# Patient Record
Sex: FEMALE | Race: WHITE | NOT HISPANIC OR LATINO | Employment: OTHER | URBAN - METROPOLITAN AREA
[De-identification: names, ages, dates, MRNs, and addresses within clinical notes are randomized per-mention and may not be internally consistent; named-entity substitution may affect disease eponyms.]

---

## 2023-03-03 PROBLEM — J45.909 REACTIVE AIRWAY DISEASE (HHS-HCC): Status: ACTIVE | Noted: 2023-03-03

## 2023-03-03 PROBLEM — M26.659 TMJ ARTHROPATHY: Status: ACTIVE | Noted: 2023-03-03

## 2023-03-03 PROBLEM — K20.90 ESOPHAGITIS: Status: ACTIVE | Noted: 2023-03-03

## 2023-03-03 PROBLEM — L81.9 PIGMENTED SKIN LESION: Status: ACTIVE | Noted: 2023-03-03

## 2023-03-03 PROBLEM — N64.89 BREAST ASYMMETRY: Status: ACTIVE | Noted: 2023-03-03

## 2023-03-03 PROBLEM — R31.9 HEMATURIA: Status: ACTIVE | Noted: 2023-03-03

## 2023-03-03 PROBLEM — E78.5 DYSLIPIDEMIA: Status: ACTIVE | Noted: 2023-03-03

## 2023-03-03 PROBLEM — R73.03 PREDIABETES: Status: ACTIVE | Noted: 2023-03-03

## 2023-03-03 PROBLEM — R82.90 ABNORMAL FINDING IN URINE: Status: ACTIVE | Noted: 2023-03-03

## 2023-03-03 PROBLEM — R05.9 COUGH: Status: ACTIVE | Noted: 2023-03-03

## 2023-03-03 PROBLEM — E55.9 VITAMIN D DEFICIENCY: Status: ACTIVE | Noted: 2023-03-03

## 2023-03-03 PROBLEM — N90.89 VULVAR LESION: Status: ACTIVE | Noted: 2023-03-03

## 2023-03-03 PROBLEM — I45.6 WOLFF-PARKINSON-WHITE SYNDROME: Status: ACTIVE | Noted: 2023-03-03

## 2023-03-03 PROBLEM — R63.4 WEIGHT LOSS: Status: ACTIVE | Noted: 2023-03-03

## 2023-03-03 PROBLEM — R53.83 FATIGUE: Status: ACTIVE | Noted: 2023-03-03

## 2023-03-03 PROBLEM — E53.8 VITAMIN B12 DEFICIENCY: Status: ACTIVE | Noted: 2023-03-03

## 2023-03-03 PROBLEM — M85.80 OSTEOPENIA: Status: ACTIVE | Noted: 2023-03-03

## 2023-03-03 PROBLEM — R10.13 EPIGASTRIC PAIN: Status: ACTIVE | Noted: 2023-03-03

## 2023-03-03 PROBLEM — R41.3 MEMORY LOSS: Status: ACTIVE | Noted: 2023-03-03

## 2023-03-03 PROBLEM — R10.9 ABDOMINAL PAIN: Status: ACTIVE | Noted: 2023-03-03

## 2023-03-06 ENCOUNTER — OFFICE VISIT (OUTPATIENT)
Dept: PRIMARY CARE | Facility: CLINIC | Age: 73
End: 2023-03-06
Payer: MEDICARE

## 2023-03-06 VITALS
HEART RATE: 84 BPM | BODY MASS INDEX: 19.14 KG/M2 | HEIGHT: 62 IN | SYSTOLIC BLOOD PRESSURE: 136 MMHG | DIASTOLIC BLOOD PRESSURE: 82 MMHG | WEIGHT: 104 LBS

## 2023-03-06 DIAGNOSIS — I45.6 WOLFF-PARKINSON-WHITE SYNDROME: ICD-10-CM

## 2023-03-06 DIAGNOSIS — F41.9 ANXIETY: ICD-10-CM

## 2023-03-06 DIAGNOSIS — R07.9 CHEST PAIN, UNSPECIFIED TYPE: Primary | ICD-10-CM

## 2023-03-06 DIAGNOSIS — K20.90 ESOPHAGITIS: ICD-10-CM

## 2023-03-06 PROCEDURE — 99215 OFFICE O/P EST HI 40 MIN: CPT | Performed by: FAMILY MEDICINE

## 2023-03-06 PROCEDURE — 93000 ELECTROCARDIOGRAM COMPLETE: CPT | Performed by: FAMILY MEDICINE

## 2023-03-06 PROCEDURE — 1159F MED LIST DOCD IN RCRD: CPT | Performed by: FAMILY MEDICINE

## 2023-03-06 RX ORDER — PANTOPRAZOLE SODIUM 40 MG/1
40 TABLET, DELAYED RELEASE ORAL DAILY
Qty: 30 TABLET | Refills: 1 | Status: SHIPPED | OUTPATIENT
Start: 2023-03-06 | End: 2023-10-26 | Stop reason: ALTCHOICE

## 2023-03-06 RX ORDER — HYDROXYZINE HYDROCHLORIDE 10 MG/1
10 TABLET, FILM COATED ORAL EVERY 6 HOURS PRN
Qty: 30 TABLET | Refills: 0 | Status: SHIPPED | OUTPATIENT
Start: 2023-03-06 | End: 2023-08-07 | Stop reason: ALTCHOICE

## 2023-03-06 ASSESSMENT — ENCOUNTER SYMPTOMS
APPETITE CHANGE: 1
WEAKNESS: 1
NAUSEA: 1
ALLERGIC/IMMUNOLOGIC NEGATIVE: 1
RESPIRATORY NEGATIVE: 1
DIZZINESS: 1
EYES NEGATIVE: 1
NERVOUS/ANXIOUS: 1
HEMATOLOGIC/LYMPHATIC NEGATIVE: 1
NUMBNESS: 1
MUSCULOSKELETAL NEGATIVE: 1
ABDOMINAL PAIN: 1
ENDOCRINE NEGATIVE: 1

## 2023-03-06 NOTE — PROGRESS NOTES
"Subjective   Patient ID: Abena Shaw is a 72 y.o. female who presents for Chest Pain (Pt is experiencing chest tightness/ pressure  that travels up to her throat ).  Today she is accompanied by huband     Chest Pain   Associated symptoms include abdominal pain, dizziness, nausea, numbness and weakness.    patient complains of chest pain feels pain in the abdomen radiating upwards also feels discomfort in her throat occasional nausea tingling has had worsening anxiety has history of esophagitis also has history of WPW denies syncope  Current Outpatient Medications on File Prior to Visit   Medication Sig Dispense Refill    CALCIUM ORAL Calcium TABS   Refills: 0       Active       No current facility-administered medications on file prior to visit.        Review of Systems   Constitutional:  Positive for appetite change.   HENT: Negative.     Eyes: Negative.    Respiratory: Negative.     Cardiovascular:  Positive for chest pain.   Gastrointestinal:  Positive for abdominal pain and nausea.   Endocrine: Negative.    Genitourinary: Negative.    Musculoskeletal: Negative.    Allergic/Immunologic: Negative.    Neurological:  Positive for dizziness, weakness and numbness.   Hematological: Negative.    Psychiatric/Behavioral:  The patient is nervous/anxious.        Objective   /82   Pulse 84   Ht 1.575 m (5' 2\")   Wt 47.2 kg (104 lb)   BMI 19.02 kg/m²   BSA: 1.44 meters squared  Growth percentiles: Facility age limit for growth %celestino is 20 years. Facility age limit for growth %celsetino is 20 years.   No visits with results within 1 Week(s) from this visit.   Latest known visit with results is:   Legacy Encounter on 06/21/2022   Component Date Value Ref Range Status    Folate 06/21/2022 6.1  >5.0 ng/mL Final    Comment: Low           <3.4  Borderline 3.4-5.0  Normal        >5.0  .   Biotin interference may cause falsely elevated results.    Patients taking a Biotin dose of up to 5 mg/day should    refrain from " taking Biotin for 24 hours before sample    collection. Providers may contact their local laboratory   for further information.        Physical Exam  Vitals and nursing note reviewed.   Constitutional:       Appearance: Normal appearance.   HENT:      Head: Normocephalic and atraumatic.      Right Ear: Tympanic membrane normal.      Left Ear: Tympanic membrane normal.      Nose: Nose normal.      Mouth/Throat:      Mouth: Mucous membranes are moist.   Eyes:      Pupils: Pupils are equal, round, and reactive to light.   Cardiovascular:      Rate and Rhythm: Normal rate and regular rhythm.      Pulses: Normal pulses.      Heart sounds: Normal heart sounds.   Pulmonary:      Effort: Pulmonary effort is normal.      Breath sounds: Normal breath sounds.   Abdominal:      General: Abdomen is flat. Bowel sounds are normal.      Palpations: Abdomen is soft.   Musculoskeletal:         General: Normal range of motion.      Cervical back: Normal range of motion and neck supple.   Skin:     General: Skin is warm and dry.      Capillary Refill: Capillary refill takes less than 2 seconds.   Neurological:      General: No focal deficit present.      Mental Status: She is alert and oriented to person, place, and time.   Psychiatric:         Mood and Affect: Mood normal.         Assessment/Plan   Problem List Items Addressed This Visit          Circulatory    Renea-Parkinson-White syndrome     Patient has not had any episodes since her ablation will refer to cardiology         Relevant Orders    Referral to Cardiology       Digestive    Esophagitis     Start PPI eat small portions avoid eating late at night decrease caffeine intake and stop vaping         Relevant Medications    pantoprazole (ProtoNix) 40 mg EC tablet       Other    Chest pain - Primary     cardiology referral placed  has been a smoker         Relevant Medications    pantoprazole (ProtoNix) 40 mg EC tablet    Other Relevant Orders    ECG 12 lead (Completed)     Referral to Cardiology    Anxiety     Start hydroxyzine as needed patient will call back depending on how she tolerates medication         Relevant Medications    hydrOXYzine HCL (Atarax) 10 mg tablet     [unfilled]    Assessment/Plan   Problem List Items Addressed This Visit          Circulatory    Renea-Parkinson-White syndrome     Patient has not had any episodes since her ablation will refer to cardiology         Relevant Orders    Referral to Cardiology       Digestive    Esophagitis     Start PPI eat small portions avoid eating late at night decrease caffeine intake and stop vaping         Relevant Medications    pantoprazole (ProtoNix) 40 mg EC tablet       Other    Chest pain - Primary     cardiology referral placed  has been a smoker         Relevant Medications    pantoprazole (ProtoNix) 40 mg EC tablet    Other Relevant Orders    ECG 12 lead (Completed)    Referral to Cardiology    Anxiety     Start hydroxyzine as needed patient will call back depending on how she tolerates medication         Relevant Medications    hydrOXYzine HCL (Atarax) 10 mg tablet

## 2023-08-07 ENCOUNTER — OFFICE VISIT (OUTPATIENT)
Dept: PRIMARY CARE | Facility: CLINIC | Age: 73
End: 2023-08-07
Payer: MEDICARE

## 2023-08-07 VITALS
HEIGHT: 62 IN | WEIGHT: 100 LBS | DIASTOLIC BLOOD PRESSURE: 80 MMHG | SYSTOLIC BLOOD PRESSURE: 157 MMHG | HEART RATE: 62 BPM | BODY MASS INDEX: 18.4 KG/M2

## 2023-08-07 DIAGNOSIS — E53.8 VITAMIN B12 DEFICIENCY: ICD-10-CM

## 2023-08-07 DIAGNOSIS — Z78.0 POSTMENOPAUSAL: ICD-10-CM

## 2023-08-07 DIAGNOSIS — I47.10 SUPRAVENTRICULAR TACHYCARDIA, PAROXYSMAL (CMS-HCC): ICD-10-CM

## 2023-08-07 DIAGNOSIS — M85.88 OSTEOPENIA OF LUMBAR SPINE: ICD-10-CM

## 2023-08-07 DIAGNOSIS — E78.5 DYSLIPIDEMIA: ICD-10-CM

## 2023-08-07 DIAGNOSIS — Z12.31 VISIT FOR SCREENING MAMMOGRAM: ICD-10-CM

## 2023-08-07 DIAGNOSIS — N64.9 DISORDER OF BREAST, UNSPECIFIED: ICD-10-CM

## 2023-08-07 DIAGNOSIS — Z12.11 COLON CANCER SCREENING: ICD-10-CM

## 2023-08-07 DIAGNOSIS — Z00.00 ROUTINE GENERAL MEDICAL EXAMINATION AT A HEALTH CARE FACILITY: Primary | ICD-10-CM

## 2023-08-07 DIAGNOSIS — E55.9 VITAMIN D DEFICIENCY: ICD-10-CM

## 2023-08-07 DIAGNOSIS — N63.0 MASS OF BREAST, UNSPECIFIED LATERALITY: ICD-10-CM

## 2023-08-07 DIAGNOSIS — Z00.00 ROUTINE GENERAL MEDICAL EXAMINATION AT HEALTH CARE FACILITY: ICD-10-CM

## 2023-08-07 PROBLEM — R06.09 DOE (DYSPNEA ON EXERTION): Status: ACTIVE | Noted: 2023-08-07

## 2023-08-07 PROBLEM — R42 LIGHTHEADEDNESS: Status: ACTIVE | Noted: 2023-08-07

## 2023-08-07 LAB
ALANINE AMINOTRANSFERASE (SGPT) (U/L) IN SER/PLAS: 12 U/L (ref 7–45)
ALBUMIN (G/DL) IN SER/PLAS: 4.1 G/DL (ref 3.4–5)
ALKALINE PHOSPHATASE (U/L) IN SER/PLAS: 46 U/L (ref 33–136)
ANION GAP IN SER/PLAS: 12 MMOL/L (ref 10–20)
ASPARTATE AMINOTRANSFERASE (SGOT) (U/L) IN SER/PLAS: 20 U/L (ref 9–39)
BILIRUBIN TOTAL (MG/DL) IN SER/PLAS: 0.6 MG/DL (ref 0–1.2)
CALCIDIOL (25 OH VITAMIN D3) (NG/ML) IN SER/PLAS: 63 NG/ML
CALCIUM (MG/DL) IN SER/PLAS: 10 MG/DL (ref 8.6–10.6)
CARBON DIOXIDE, TOTAL (MMOL/L) IN SER/PLAS: 29 MMOL/L (ref 21–32)
CHLORIDE (MMOL/L) IN SER/PLAS: 104 MMOL/L (ref 98–107)
COBALAMIN (VITAMIN B12) (PG/ML) IN SER/PLAS: >2000 PG/ML (ref 211–911)
CREATININE (MG/DL) IN SER/PLAS: 0.81 MG/DL (ref 0.5–1.05)
ERYTHROCYTE DISTRIBUTION WIDTH (RATIO) BY AUTOMATED COUNT: 13.1 % (ref 11.5–14.5)
ERYTHROCYTE MEAN CORPUSCULAR HEMOGLOBIN CONCENTRATION (G/DL) BY AUTOMATED: 31.1 G/DL (ref 32–36)
ERYTHROCYTE MEAN CORPUSCULAR VOLUME (FL) BY AUTOMATED COUNT: 98 FL (ref 80–100)
ERYTHROCYTES (10*6/UL) IN BLOOD BY AUTOMATED COUNT: 4.22 X10E12/L (ref 4–5.2)
GFR FEMALE: 76 ML/MIN/1.73M2
GLUCOSE (MG/DL) IN SER/PLAS: 95 MG/DL (ref 74–99)
HEMATOCRIT (%) IN BLOOD BY AUTOMATED COUNT: 41.2 % (ref 36–46)
HEMOGLOBIN (G/DL) IN BLOOD: 12.8 G/DL (ref 12–16)
LEUKOCYTES (10*3/UL) IN BLOOD BY AUTOMATED COUNT: 8 X10E9/L (ref 4.4–11.3)
NRBC (PER 100 WBCS) BY AUTOMATED COUNT: 0 /100 WBC (ref 0–0)
PLATELETS (10*3/UL) IN BLOOD AUTOMATED COUNT: 308 X10E9/L (ref 150–450)
POTASSIUM (MMOL/L) IN SER/PLAS: 4.1 MMOL/L (ref 3.5–5.3)
PROTEIN TOTAL: 7.4 G/DL (ref 6.4–8.2)
SODIUM (MMOL/L) IN SER/PLAS: 141 MMOL/L (ref 136–145)
THYROTROPIN (MIU/L) IN SER/PLAS BY DETECTION LIMIT <= 0.05 MIU/L: 1.7 MIU/L (ref 0.44–3.98)
UREA NITROGEN (MG/DL) IN SER/PLAS: 13 MG/DL (ref 6–23)

## 2023-08-07 PROCEDURE — 1160F RVW MEDS BY RX/DR IN RCRD: CPT | Performed by: FAMILY MEDICINE

## 2023-08-07 PROCEDURE — 4004F PT TOBACCO SCREEN RCVD TLK: CPT | Performed by: FAMILY MEDICINE

## 2023-08-07 PROCEDURE — 99397 PER PM REEVAL EST PAT 65+ YR: CPT | Performed by: FAMILY MEDICINE

## 2023-08-07 PROCEDURE — 80053 COMPREHEN METABOLIC PANEL: CPT

## 2023-08-07 PROCEDURE — 80061 LIPID PANEL: CPT

## 2023-08-07 PROCEDURE — 84443 ASSAY THYROID STIM HORMONE: CPT

## 2023-08-07 PROCEDURE — G0439 PPPS, SUBSEQ VISIT: HCPCS | Performed by: FAMILY MEDICINE

## 2023-08-07 PROCEDURE — 82306 VITAMIN D 25 HYDROXY: CPT

## 2023-08-07 PROCEDURE — G0444 DEPRESSION SCREEN ANNUAL: HCPCS | Performed by: FAMILY MEDICINE

## 2023-08-07 PROCEDURE — 82607 VITAMIN B-12: CPT

## 2023-08-07 PROCEDURE — 85027 COMPLETE CBC AUTOMATED: CPT

## 2023-08-07 PROCEDURE — 1159F MED LIST DOCD IN RCRD: CPT | Performed by: FAMILY MEDICINE

## 2023-08-07 PROCEDURE — 1170F FXNL STATUS ASSESSED: CPT | Performed by: FAMILY MEDICINE

## 2023-08-07 RX ORDER — ACETAMINOPHEN 500 MG
TABLET ORAL
COMMUNITY
Start: 2023-04-06

## 2023-08-07 RX ORDER — NITROGLYCERIN 0.4 MG/1
TABLET SUBLINGUAL
COMMUNITY
Start: 2023-05-03 | End: 2024-02-15 | Stop reason: ALTCHOICE

## 2023-08-07 RX ORDER — METOPROLOL SUCCINATE 50 MG/1
50 TABLET, EXTENDED RELEASE ORAL DAILY
COMMUNITY
End: 2024-05-06 | Stop reason: SDUPTHER

## 2023-08-07 RX ORDER — VITAMIN B COMPLEX
TABLET ORAL
COMMUNITY
Start: 2023-04-06

## 2023-08-07 ASSESSMENT — PATIENT HEALTH QUESTIONNAIRE - PHQ9
SUM OF ALL RESPONSES TO PHQ9 QUESTIONS 1 AND 2: 0
2. FEELING DOWN, DEPRESSED OR HOPELESS: NOT AT ALL
1. LITTLE INTEREST OR PLEASURE IN DOING THINGS: NOT AT ALL

## 2023-08-07 ASSESSMENT — ACTIVITIES OF DAILY LIVING (ADL)
GROCERY_SHOPPING: INDEPENDENT
BATHING: INDEPENDENT
MANAGING_FINANCES: INDEPENDENT
DRESSING: INDEPENDENT
TAKING_MEDICATION: INDEPENDENT
DOING_HOUSEWORK: INDEPENDENT

## 2023-08-07 ASSESSMENT — ENCOUNTER SYMPTOMS
DEPRESSION: 0
OCCASIONAL FEELINGS OF UNSTEADINESS: 0
LOSS OF SENSATION IN FEET: 0

## 2023-08-07 NOTE — PROGRESS NOTES
"Subjective   Reason for Visit: Abena Shaw is an 72 y.o. female here for a Medicare Wellness visit.     Past Medical, Surgical, and Family History reviewed and updated in chart.    Reviewed all medications by prescribing practitioner or clinical pharmacist (such as prescriptions, OTCs, herbal therapies and supplements) and documented in the medical record.    HPI feels mass left and right breast    Patient Care Team:  Phillip Mendosa MD as PCP - General  Phillip Mendosa MD as PCP - [employer] PCP     Review of Systems   Constitutional:  Negative for chills and fever.   HENT: Negative.     Respiratory: Negative.     Cardiovascular: Negative.    Gastrointestinal: Negative.  Negative for nausea and vomiting.   Endocrine: Negative.    Genitourinary: Negative.    Musculoskeletal: Negative.    Skin: Negative.  Negative for rash.   Allergic/Immunologic: Negative.    Neurological: Negative.    Hematological: Negative.    Psychiatric/Behavioral: Negative.     All other systems reviewed and are negative.      Objective   Vitals:  Blood Pressure 157/80   Pulse 62   Height 1.575 m (5' 2\")   Weight 45.4 kg (100 lb)   Body Mass Index 18.29 kg/m²       Physical Exam  Constitutional:       General: She is not in acute distress.  Eyes:      Extraocular Movements: Extraocular movements intact.   Cardiovascular:      Rate and Rhythm: Normal rate and regular rhythm.   Pulmonary:      Breath sounds: Normal breath sounds.   Abdominal:      General: Bowel sounds are normal.   Musculoskeletal:         General: Normal range of motion.      Cervical back: No rigidity.   Skin:     Findings: No rash.   Neurological:      General: No focal deficit present.      Mental Status: She is alert.   Psychiatric:         Thought Content: Thought content normal.         Assessment/Plan   Problem List Items Addressed This Visit       Dyslipidemia    Relevant Orders    CBC (Completed)    Comprehensive Metabolic Panel (Completed) " "   TSH with reflex to Free T4 if abnormal (Completed)    Osteopenia    Relevant Orders    CBC (Completed)    Comprehensive Metabolic Panel (Completed)    TSH with reflex to Free T4 if abnormal (Completed)    Vitamin D, Total (Completed)    Vitamin B12 deficiency    Relevant Orders    CBC (Completed)    Comprehensive Metabolic Panel (Completed)    TSH with reflex to Free T4 if abnormal (Completed)    Vitamin B12 (Completed)    Vitamin D deficiency    Relevant Orders    CBC (Completed)    Comprehensive Metabolic Panel (Completed)    TSH with reflex to Free T4 if abnormal (Completed)    Vitamin D, Total (Completed)    Supraventricular tachycardia, paroxysmal (CMS/HCC)    Current Assessment & Plan     C/w metoprolol         Relevant Medications    metoprolol succinate XL (Toprol-XL) 50 mg 24 hr tablet    nitroglycerin (Nitrostat) 0.4 mg SL tablet    Visit for screening mammogram    Relevant Orders    BI mammo bilateral screening tomosynthesis    Postmenopausal    Relevant Orders    XR DEXA bone density    Vitamin D, Total (Completed)    Colon cancer screening    Relevant Orders    Colonoscopy    Mass of breast    Relevant Orders    BI digital DIAG mamm (Completed)    Disorder of breast, unspecified    Routine general medical examination at a health care facility - Primary    spent 15 minutes obtaining and discussing depression screening using PHQ-2 questions with results documented in chart.”  (If screen positive: \"The screen indicated potential depression and PHQ-9 was obtained with treatment and referral plan discussed        "

## 2023-08-08 LAB
CHOLESTEROL (MG/DL) IN SER/PLAS: 190 MG/DL (ref 0–199)
CHOLESTEROL IN HDL (MG/DL) IN SER/PLAS: 72.7 MG/DL
CHOLESTEROL/HDL RATIO: 2.6
LDL: 101 MG/DL (ref 0–99)
TRIGLYCERIDE (MG/DL) IN SER/PLAS: 82 MG/DL (ref 0–149)
VLDL: 16 MG/DL (ref 0–40)

## 2023-08-09 ENCOUNTER — TELEPHONE (OUTPATIENT)
Dept: PRIMARY CARE | Facility: CLINIC | Age: 73
End: 2023-08-09
Payer: MEDICARE

## 2023-08-09 NOTE — TELEPHONE ENCOUNTER
----- Message from Phillip Mendosa MD sent at 8/9/2023 12:30 PM EDT -----  Please advise patient labs are normal

## 2023-08-13 PROBLEM — Z12.31 VISIT FOR SCREENING MAMMOGRAM: Status: ACTIVE | Noted: 2023-08-13

## 2023-08-13 PROBLEM — N63.0 MASS OF BREAST: Status: ACTIVE | Noted: 2023-08-13

## 2023-08-13 PROBLEM — Z78.0 POSTMENOPAUSAL: Status: ACTIVE | Noted: 2023-08-13

## 2023-08-13 PROBLEM — Z12.11 COLON CANCER SCREENING: Status: ACTIVE | Noted: 2023-08-13

## 2023-08-13 PROBLEM — Z00.00 ROUTINE GENERAL MEDICAL EXAMINATION AT A HEALTH CARE FACILITY: Status: ACTIVE | Noted: 2023-08-13

## 2023-08-13 PROBLEM — N64.9 DISORDER OF BREAST, UNSPECIFIED: Status: ACTIVE | Noted: 2023-08-13

## 2023-08-13 ASSESSMENT — ENCOUNTER SYMPTOMS
ALLERGIC/IMMUNOLOGIC NEGATIVE: 1
CARDIOVASCULAR NEGATIVE: 1
ENDOCRINE NEGATIVE: 1
NEUROLOGICAL NEGATIVE: 1
RESPIRATORY NEGATIVE: 1
HEMATOLOGIC/LYMPHATIC NEGATIVE: 1
NAUSEA: 0
PSYCHIATRIC NEGATIVE: 1
FEVER: 0
MUSCULOSKELETAL NEGATIVE: 1
VOMITING: 0
GASTROINTESTINAL NEGATIVE: 1
CHILLS: 0

## 2023-08-14 ENCOUNTER — TELEPHONE (OUTPATIENT)
Dept: PRIMARY CARE | Facility: CLINIC | Age: 73
End: 2023-08-14
Payer: MEDICARE

## 2023-08-24 ENCOUNTER — APPOINTMENT (OUTPATIENT)
Dept: PRIMARY CARE | Facility: CLINIC | Age: 73
End: 2023-08-24
Payer: MEDICARE

## 2023-11-02 ENCOUNTER — ANESTHESIA EVENT (OUTPATIENT)
Dept: GASTROENTEROLOGY | Facility: HOSPITAL | Age: 73
End: 2023-11-02
Payer: MEDICARE

## 2023-11-02 ENCOUNTER — ANESTHESIA (OUTPATIENT)
Dept: GASTROENTEROLOGY | Facility: HOSPITAL | Age: 73
End: 2023-11-02
Payer: MEDICARE

## 2023-11-02 ENCOUNTER — HOSPITAL ENCOUNTER (OUTPATIENT)
Dept: GASTROENTEROLOGY | Facility: HOSPITAL | Age: 73
Discharge: HOME | End: 2023-11-02
Payer: MEDICARE

## 2023-11-02 VITALS
BODY MASS INDEX: 17.66 KG/M2 | RESPIRATION RATE: 16 BRPM | HEART RATE: 64 BPM | SYSTOLIC BLOOD PRESSURE: 113 MMHG | DIASTOLIC BLOOD PRESSURE: 61 MMHG | WEIGHT: 96 LBS | HEIGHT: 62 IN | OXYGEN SATURATION: 98 % | TEMPERATURE: 99.7 F

## 2023-11-02 DIAGNOSIS — Z12.11 COLON CANCER SCREENING: Primary | ICD-10-CM

## 2023-11-02 PROCEDURE — A45385 PR COLONOSCOPY,REMV LESN,SNARE: Performed by: ANESTHESIOLOGY

## 2023-11-02 PROCEDURE — 99100 ANES PT EXTEME AGE<1 YR&>70: CPT | Performed by: ANESTHESIOLOGY

## 2023-11-02 PROCEDURE — 88305 TISSUE EXAM BY PATHOLOGIST: CPT | Performed by: STUDENT IN AN ORGANIZED HEALTH CARE EDUCATION/TRAINING PROGRAM

## 2023-11-02 PROCEDURE — 45380 COLONOSCOPY AND BIOPSY: CPT | Performed by: INTERNAL MEDICINE

## 2023-11-02 PROCEDURE — 2500000005 HC RX 250 GENERAL PHARMACY W/O HCPCS: Performed by: NURSE ANESTHETIST, CERTIFIED REGISTERED

## 2023-11-02 PROCEDURE — 3700000002 HC GENERAL ANESTHESIA TIME - EACH INCREMENTAL 1 MINUTE

## 2023-11-02 PROCEDURE — 88305 TISSUE EXAM BY PATHOLOGIST: CPT | Mod: TC,AHULAB | Performed by: INTERNAL MEDICINE

## 2023-11-02 PROCEDURE — 2500000004 HC RX 250 GENERAL PHARMACY W/ HCPCS (ALT 636 FOR OP/ED): Performed by: INTERNAL MEDICINE

## 2023-11-02 PROCEDURE — 45385 COLONOSCOPY W/LESION REMOVAL: CPT | Performed by: INTERNAL MEDICINE

## 2023-11-02 PROCEDURE — 7100000009 HC PHASE TWO TIME - INITIAL BASE CHARGE

## 2023-11-02 PROCEDURE — A45385 PR COLONOSCOPY,REMV LESN,SNARE: Performed by: NURSE ANESTHETIST, CERTIFIED REGISTERED

## 2023-11-02 PROCEDURE — 2500000004 HC RX 250 GENERAL PHARMACY W/ HCPCS (ALT 636 FOR OP/ED): Performed by: NURSE ANESTHETIST, CERTIFIED REGISTERED

## 2023-11-02 PROCEDURE — 3700000001 HC GENERAL ANESTHESIA TIME - INITIAL BASE CHARGE

## 2023-11-02 PROCEDURE — 88305 TISSUE EXAM BY PATHOLOGIST: CPT | Mod: TC,SUR,AHULAB | Performed by: INTERNAL MEDICINE

## 2023-11-02 PROCEDURE — 7100000010 HC PHASE TWO TIME - EACH INCREMENTAL 1 MINUTE

## 2023-11-02 RX ORDER — LIDOCAINE HYDROCHLORIDE 20 MG/ML
INJECTION, SOLUTION INFILTRATION; PERINEURAL AS NEEDED
Status: DISCONTINUED | OUTPATIENT
Start: 2023-11-02 | End: 2023-11-02

## 2023-11-02 RX ORDER — ASPIRIN 81 MG/1
81 TABLET ORAL DAILY
COMMUNITY

## 2023-11-02 RX ORDER — FLUMAZENIL 0.1 MG/ML
0.2 INJECTION INTRAVENOUS ONCE AS NEEDED
Status: DISCONTINUED | OUTPATIENT
Start: 2023-11-02 | End: 2023-11-03 | Stop reason: HOSPADM

## 2023-11-02 RX ORDER — SODIUM CHLORIDE, SODIUM LACTATE, POTASSIUM CHLORIDE, CALCIUM CHLORIDE 600; 310; 30; 20 MG/100ML; MG/100ML; MG/100ML; MG/100ML
20 INJECTION, SOLUTION INTRAVENOUS CONTINUOUS
Status: DISCONTINUED | OUTPATIENT
Start: 2023-11-02 | End: 2023-11-03 | Stop reason: HOSPADM

## 2023-11-02 RX ORDER — NALOXONE HYDROCHLORIDE 1 MG/ML
0.2 INJECTION INTRAMUSCULAR; INTRAVENOUS; SUBCUTANEOUS EVERY 5 MIN PRN
Status: DISCONTINUED | OUTPATIENT
Start: 2023-11-02 | End: 2023-11-03 | Stop reason: HOSPADM

## 2023-11-02 RX ORDER — PROPOFOL 10 MG/ML
INJECTION, EMULSION INTRAVENOUS CONTINUOUS PRN
Status: DISCONTINUED | OUTPATIENT
Start: 2023-11-02 | End: 2023-11-02

## 2023-11-02 RX ORDER — ONDANSETRON HYDROCHLORIDE 2 MG/ML
4 INJECTION, SOLUTION INTRAVENOUS ONCE AS NEEDED
Status: DISCONTINUED | OUTPATIENT
Start: 2023-11-02 | End: 2023-11-03 | Stop reason: HOSPADM

## 2023-11-02 RX ORDER — PROPOFOL 10 MG/ML
INJECTION, EMULSION INTRAVENOUS AS NEEDED
Status: DISCONTINUED | OUTPATIENT
Start: 2023-11-02 | End: 2023-11-02

## 2023-11-02 RX ADMIN — PROPOFOL 100 MCG/KG/MIN: 10 INJECTION, EMULSION INTRAVENOUS at 08:25

## 2023-11-02 RX ADMIN — LIDOCAINE HYDROCHLORIDE 50 MG: 20 INJECTION, SOLUTION INFILTRATION; PERINEURAL at 08:25

## 2023-11-02 RX ADMIN — SODIUM CHLORIDE, POTASSIUM CHLORIDE, SODIUM LACTATE AND CALCIUM CHLORIDE: 600; 310; 30; 20 INJECTION, SOLUTION INTRAVENOUS at 08:14

## 2023-11-02 RX ADMIN — PROPOFOL 100 MG: 10 INJECTION, EMULSION INTRAVENOUS at 08:25

## 2023-11-02 ASSESSMENT — PAIN SCALES - GENERAL
PAINLEVEL_OUTOF10: 0 - NO PAIN

## 2023-11-02 ASSESSMENT — PAIN - FUNCTIONAL ASSESSMENT
PAIN_FUNCTIONAL_ASSESSMENT: 0-10
PAIN_FUNCTIONAL_ASSESSMENT: 0-10
PAIN_FUNCTIONAL_ASSESSMENT: VAS (VISUAL ANALOG SCALE)
PAIN_FUNCTIONAL_ASSESSMENT: 0-10

## 2023-11-02 ASSESSMENT — COLUMBIA-SUICIDE SEVERITY RATING SCALE - C-SSRS
6. HAVE YOU EVER DONE ANYTHING, STARTED TO DO ANYTHING, OR PREPARED TO DO ANYTHING TO END YOUR LIFE?: NO
1. IN THE PAST MONTH, HAVE YOU WISHED YOU WERE DEAD OR WISHED YOU COULD GO TO SLEEP AND NOT WAKE UP?: NO
2. HAVE YOU ACTUALLY HAD ANY THOUGHTS OF KILLING YOURSELF?: NO

## 2023-11-02 NOTE — H&P
"History Of Present Illness  Abena Shaw is a 73 y.o. female  here for screening colonoscopy. Last colonoscopy was 10 years ago and was normal. No FH of colon cancer or polyps.       Past Medical History  Past Medical History:   Diagnosis Date    Other specified noninflammatory disorders of vulva and perineum 04/17/2017    Vulvar lesion    WPW (Renea-Parkinson-White syndrome)     s/p ablation 2014       Surgical History  Past Surgical History:   Procedure Laterality Date    BREAST SURGERY      augmentation    CHOLECYSTECTOMY  10/15/2015    Cholecystectomy Laparoscopic    OTHER SURGICAL HISTORY  2014    Cardiac Catheter His Ablation        Social History  She reports that she quit smoking about 5 years ago. Her smoking use included cigarettes. She has been exposed to tobacco smoke. She uses smokeless tobacco. She reports that she does not drink alcohol and does not use drugs.    Family History  Family History   Problem Relation Name Age of Onset    Breast cancer Mother          Allergies  Patient has no known allergies.    Review of Systems   All other systems reviewed and are negative.         Physical Exam  Vitals reviewed.   Constitutional:       General: She is awake.   Pulmonary:      Effort: Pulmonary effort is normal.      Breath sounds: Normal breath sounds.   Abdominal:      General: Bowel sounds are normal.      Palpations: Abdomen is soft.      Tenderness: There is no abdominal tenderness.   Neurological:      Mental Status: She is alert and oriented to person, place, and time.   Psychiatric:         Attention and Perception: Attention and perception normal.         Behavior: Behavior normal.            Last Recorded Vitals  Blood pressure 137/68, pulse 64, temperature 36.4 °C (97.5 °F), temperature source Temporal, resp. rate 16, height 1.575 m (5' 2\"), weight (!) 43.5 kg (96 lb), SpO2 96 %.    Relevant Results  Reviewed chart       Assessment/Plan      Proceed with colonoscopy      Renée FIELD" MD Willian

## 2023-11-02 NOTE — DISCHARGE INSTRUCTIONS
Patient Instructions after a Colonoscopy      The anesthetics, sedatives or narcotics which were given to you today will be acting in your body for the next 24 hours, so you might feel a little sleepy or groggy.  This feeling should slowly wear off. Carefully read and follow the instructions.     You received sedation today:  - Do not drive or operate any machinery or power tools of any kind.   - No alcoholic beverages today, not even beer or wine.  - Do not make any important decisions or sign any legal documents.  - No over the counter medications that contain alcohol or that may cause drowsiness.  - Do not make any important decisions or sign any legal documents.    While it is common to experience mild to moderate abdominal distention, gas, or belching after your procedure, if any of these symptoms occur following discharge from the GI Lab or within one week of having your procedure, call the [facility] to be advised whether a visit to your nearest Urgent Care or Emergency Department is indicated.  Take this paper with you if you go.     - If you develop an allergic reaction to the medications that were given during your procedure such as difficulty breathing, rash, hives, severe nausea, vomiting or lightheadedness.  - If you experience chest pain, shortness of breath, severe abdominal pain, fevers and chills.  -If you develop signs and symptoms of bleeding such as blood in your spit, if your stools turn black, tarry, or bloody  - If you have not urinated within 8 hours following your procedure.  - If your IV site becomes painful, red, inflamed, or looks infected.    If you received a biopsy/polypectomy/sphincterotomy the following instructions apply below:    __ Do not use Aspirin containing products, non-steroidal medications or anti-coagulants for one week following your procedure. (Examples of these types of medications are: Advil, Arthrotec, Aleve, Coumadin, Ecotrin, Heparin, Ibuprofen,  Indocin, Motrin, Naprosyn, Nuprin, Plavix, Vioxx, and Voltarin, or their generic forms.  This list is not all-inclusive.  Check with your physician or pharmacist before resuming medications.)   __ Eat a soft diet today.  Avoid foods that are poorly digested for the next 24 hours.  These foods would include: nuts, beans, lettuce, red meats, and fried foods. Start with liquids and advance your diet as tolerated, gradually work up to eating solids.   __ Do not have a Barium Study or Enema for one week.    Your physician recommends the additional following instructions:    -You have a contact number available for emergencies. The signs and symptoms of potential delayed complications were discussed with you. You may return to normal activities tomorrow.  -Resume your previous diet.  -Continue your present medications.   -We are waiting for your pathology results.  -Your physician has recommended a repeat colonoscopy (date to be determined after pending pathology results are reviewed) for surveillance based on pathology results.  -The findings and recommendations have been discussed with you.  -The findings and recommendations were discussed with your family.  - Please see Medication Reconciliation Form for new medication/medications prescribed.       If you experience any problems or have any questions following discharge from the GI Lab, please call:        Nurse Signature                                                                        Date___________________                                                                            Patient/Responsible Party Signature                                        Date___________________

## 2023-11-02 NOTE — ANESTHESIA POSTPROCEDURE EVALUATION
Patient: Abena Shaw    Procedure Summary       Date: 11/02/23 Room / Location: [facility]    Anesthesia Start: 0814 Anesthesia Stop: 0851    Procedure: COLONOSCOPY Diagnosis:       Colon cancer screening      Colon cancer screening    Scheduled Providers: Renée Dorsey MD; JESSE Dang, MARY ANN; Silvia Boothe, BRYN; Yazan Zheng, RN; Juanpablo Ayala, RN; Lamine Modi RN Responsible Provider: Darryl Washburn MD    Anesthesia Type: MAC ASA Status: 3            Anesthesia Type: MAC    Vitals Value Taken Time   /61 11/02/23 0918   Temp 37.6 °C (99.7 °F) 11/02/23 0848   Pulse 64 11/02/23 0918   Resp 16 11/02/23 0918   SpO2 98 % 11/02/23 0918       Anesthesia Post Evaluation    Patient location during evaluation: bedside  Patient participation: complete - patient participated  Level of consciousness: awake and alert  Pain management: satisfactory to patient  Airway patency: patent  Cardiovascular status: acceptable  Respiratory status: acceptable  Hydration status: acceptable        There were no known notable events for this encounter.

## 2023-11-02 NOTE — PERIOPERATIVE NURSING NOTE
0848 Arrival to post endo. Drowsy but easily arousable. Denies pain.  brought to bedside.    0905 Dr. Dorsey at bedside to speak to pt and .     0918  Discharge instructions reviewed with pt and , verbalized understanding. Declined offer of PO fluids/snack. Awake and alert.     0926 PIV removed and patient getting dressed.     0945 Transported to [facility] via wheelchair and discharged to home with .

## 2023-11-02 NOTE — ANESTHESIA PREPROCEDURE EVALUATION
Patient: Abena Shaw    Procedure Information       Date/Time: 23 0730    Scheduled providers: Renée Dorsey MD; JESSE Dang DNP; Silvia Boothe RN; Yazan Zheng, RN; Juanpablo Ayala, RN; Lamine Modi, RN    Procedure: COLONOSCOPY    Location: [facility]            Relevant Problems   Cardiovascular   (+) Supraventricular tachycardia, paroxysmal   (+) Renea-Parkinson-White syndrome      Neuro/Psych   (+) Anxiety      Pulmonary   (+) [place] (dyspnea on exertion)       Clinical information reviewed:   Tobacco  Allergies  Meds   Med Hx  Surg Hx  OB Status  Fam Hx  Soc   Hx        NPO/Void Status  Date of Last Liquid: 23  Time of Last Liquid: 230  Date of Last Solid: 10/31/23  Time of Last Solid:            Past Medical History:   Diagnosis Date    Other specified noninflammatory disorders of vulva and perineum 2017    Vulvar lesion    WPW (Renea-Parkinson-White syndrome)     s/p ablation       Past Surgical History:   Procedure Laterality Date    BREAST SURGERY      augmentation    CHOLECYSTECTOMY  10/15/2015    Cholecystectomy Laparoscopic    OTHER SURGICAL HISTORY  2014    Cardiac Catheter His Ablation     Social History     Tobacco Use    Smoking status: Former     Types: Cigarettes     Quit date: 2018     Years since quittin.8     Passive exposure: Past    Smokeless tobacco: Current   Vaping Use    Vaping Use: Every day   Substance Use Topics    Alcohol use: Never    Drug use: Never      Current Outpatient Medications   Medication Instructions    aspirin 81 mg, oral, Daily    CALCIUM ORAL Calcium TABS   Refills: 0       Active    cholecalciferol (Vitamin D-3) 50 mcg (2,000 unit) capsule oral, Daily RT    cyanocobalamin, vitamin B-12, 2,500 mcg tablet, sublingual sublingual, Daily RT    metoprolol succinate XL (TOPROL-XL) 50 mg, oral, Daily    nitroglycerin (Nitrostat) 0.4 mg SL tablet sublingual      No Known Allergies     Chemistry   "  Lab Results   Component Value Date/Time     08/07/2023 1745    K 4.1 08/07/2023 1745     08/07/2023 1745    CO2 29 08/07/2023 1745    BUN 13 08/07/2023 1745    CREATININE 0.81 08/07/2023 1745    Lab Results   Component Value Date/Time    CALCIUM 10.0 08/07/2023 1745    ALKPHOS 46 08/07/2023 1745    AST 20 08/07/2023 1745    ALT 12 08/07/2023 1745    BILITOT 0.6 08/07/2023 1745          Lab Results   Component Value Date/Time    WBC 8.0 08/07/2023 1745    HGB 12.8 08/07/2023 1745    HCT 41.2 08/07/2023 1745     08/07/2023 1745     No results found for: \"PROTIME\", \"PTT\", \"INR\"  No results found for this or any previous visit (from the past 4464 hour(s)).  No results found for this or any previous visit from the past 1095 days.  Stress 5/1/2023:   Negative myocardial perfusion study without evidence of inducible  ischemia or prior infarction.     The left ventricle is normal in size.     Normal LV wall motion with a post-stress LV EF estimated at greater  than 65%.    Echo 4/21/2023:  Left Ventricle: The left ventricular systolic function is normal, with an estimated ejection fraction of 60-65%. Estimated left ventricular mass is normal. There are no regional wall motion abnormalities. The left ventricular cavity size is normal. The left ventricular septal wall thickness is mildly increased. There is mildly increased left ventricular posterior wall thickness. Spectral Doppler shows an impaired relaxation pattern of left ventricular diastolic filling.  Left Atrium: The left atrium is normal in size.  Right Ventricle: The right ventricle is normal in size. There is normal right ventricular global systolic function.  Right Atrium: The right atrium is normal in size. The right atrium has a prominent Chiari network.  Aortic Valve: The aortic valve appears structurally normal. The aortic valve appears tricuspid. There is mild aortic valve cusp calcification. There is no evidence of aortic valve " "regurgitation. The peak instantaneous gradient of the aortic valve is 6.5 mmHg. The mean gradient of the aortic valve is 3.1 mmHg.  Mitral Valve: The mitral valve is normal in structure. There is no evidence of mitral valve regurgitation.  Tricuspid Valve: The tricuspid valve is structurally normal. There is mild tricuspid regurgitation.  Pulmonic Valve: The pulmonic valve is structurally normal. There is mild pulmonic valve regurgitation.  Pericardium: There is a small pericardial effusion.  Aorta: The aortic root is normal. The Ao Sinus is 3.40 cm. The Asc Ao is 3.60 cm.  Pulmonary Artery: The tricuspid regurgitant velocity is 2.44 m/s, and with an estimated right atrial pressure of 3 mmHg, the estimated pulmonary artery pressure is normal with the RVSP at 26.8 mmHg.  Systemic Veins: The inferior vena cava appears mildly dilated. There is IVC inspiratory collapse greater than 50%.  In comparison to the previous echocardiogram(s): Compared with study from 7/28/2021, no significant change.        CONCLUSIONS:  1. Left ventricular systolic function is normal with a 60-65% estimated ejection fraction.  2. Spectral Doppler shows an impaired relaxation pattern of left ventricular diastolic filling.    Visit Vitals  /68   Pulse 64   Temp 36.4 °C (97.5 °F) (Temporal)   Resp 16   Ht 1.575 m (5' 2\")   Wt (!) 43.5 kg (96 lb)   SpO2 96%   BMI 17.56 kg/m²   OB Status Postmenopausal   Smoking Status Former   BSA 1.38 m²        Anesthesia Evaluation      No history of anesthetic complications   Airway   Mallampati: III  TM distance: >3 FB  Neck ROM: full  Dental - normal exam     Pulmonary     breath sounds clear to auscultation  Cardiovascular     Rhythm: regular  Rate: normal    Neuro/Psych      GI/Hepatic/Renal      Endo/Other    Abdominal  - normal exam                      Physical Exam    Airway  Mallampati: III  TM distance: >3 FB  Neck ROM: full     Cardiovascular   Rhythm: regular  Rate: normal     Dental - normal " exam     Pulmonary   Breath sounds clear to auscultation     Abdominal - normal exam              Anesthesia Plan    ASA 3     MAC     intravenous induction   Anesthetic plan and risks discussed with patient.

## 2023-11-10 LAB
LABORATORY COMMENT REPORT: NORMAL
PATH REPORT.FINAL DX SPEC: NORMAL
PATH REPORT.GROSS SPEC: NORMAL
PATH REPORT.TOTAL CANCER: NORMAL

## 2024-02-09 ENCOUNTER — HOSPITAL ENCOUNTER (OUTPATIENT)
Dept: RADIOLOGY | Facility: CLINIC | Age: 74
End: 2024-02-09
Payer: MEDICARE

## 2024-02-15 ENCOUNTER — OFFICE VISIT (OUTPATIENT)
Dept: CARDIOLOGY | Facility: CLINIC | Age: 74
End: 2024-02-15
Payer: MEDICARE

## 2024-02-15 VITALS
WEIGHT: 99 LBS | SYSTOLIC BLOOD PRESSURE: 118 MMHG | HEART RATE: 67 BPM | DIASTOLIC BLOOD PRESSURE: 78 MMHG | HEIGHT: 62 IN | BODY MASS INDEX: 18.22 KG/M2 | OXYGEN SATURATION: 97 %

## 2024-02-15 DIAGNOSIS — E78.5 DYSLIPIDEMIA: ICD-10-CM

## 2024-02-15 DIAGNOSIS — I45.6 WOLFF-PARKINSON-WHITE SYNDROME: Primary | ICD-10-CM

## 2024-02-15 DIAGNOSIS — I47.10 SUPRAVENTRICULAR TACHYCARDIA, PAROXYSMAL (CMS-HCC): ICD-10-CM

## 2024-02-15 DIAGNOSIS — R06.09 DOE (DYSPNEA ON EXERTION): ICD-10-CM

## 2024-02-15 PROCEDURE — 99214 OFFICE O/P EST MOD 30 MIN: CPT | Performed by: STUDENT IN AN ORGANIZED HEALTH CARE EDUCATION/TRAINING PROGRAM

## 2024-02-15 PROCEDURE — 1126F AMNT PAIN NOTED NONE PRSNT: CPT | Performed by: STUDENT IN AN ORGANIZED HEALTH CARE EDUCATION/TRAINING PROGRAM

## 2024-02-15 NOTE — PATIENT INSTRUCTIONS
Please continue current cardiac medications including aspirin 81 mg daily and metoprolol succinate 50 mg daily.    Please followup with me in Cardiology clinic within the next 1 year.  Please return to clinic sooner or seek emergent care if your symptoms reoccur or worsen.

## 2024-02-15 NOTE — PROGRESS NOTES
Follow-up     HPI:    Abena Shaw is a 73 y.o. female with pertinent history of dyslipidemia, prior smoking history, fatigue, esophagitis, memory loss, osteopenia, vitamin D deficiency, history of Renea-Parkinson-White status post ablation in 2014, preserved ejection fraction with impaired relaxation echo performed 4/21/2023, no clear ischemia nuclear stress test performed 5/1/2023, no obstructive carotid disease on Dopplers performed 5/25/2023 presents to cardiology clinic for follow-up.     She is accompanied by her  who has questions and provides history.  She is doing relatively well.   She has had minimal palpitations.  Dyspnea on exertion is stable.  She does note she has not been as active as normal but mainly relates this to the winter weather.  We reviewed and discussed her prior echo, stress test, carotid Dopplers.  No exacerbating or relieving factors.  Patient denies chest pain and angina.  Pt denies orthopnea, and paroxysmal nocturnal dyspnea.  Pt denies worsening lower extremity edema.  Pt denies syncope.  No recent falls.  No fever or chills.  No cough.  No change in bowel or bladder habits.  No sick contacts.  No recent travel.    12 point review of systems including (Constitutional, Eyes, ENMT, Respiratory, Cardiac, Gastrointestinal, Neurological, Psychiatric, and Hematologic) was performed and is otherwise negative.    Past medical history reviewed:   has a past medical history of Other specified noninflammatory disorders of vulva and perineum (04/17/2017) and WPW (Renea-Parkinson-White syndrome).    Past surgical history reviewed:   has a past surgical history that includes Cholecystectomy (10/15/2015); Other surgical history (2014); and Breast surgery.    Social history reviewed:   reports that she quit smoking about 6 years ago. Her smoking use included cigarettes. She has been exposed to tobacco smoke. She uses smokeless tobacco. She reports that she does not drink alcohol and  does not use drugs.     Family history reviewed:    Family History   Problem Relation Name Age of Onset    Breast cancer Mother         Allergies reviewed: Patient has no known allergies.     Medications reviewed:   Current Outpatient Medications   Medication Instructions    aspirin 81 mg, oral, Daily    CALCIUM ORAL Calcium TABS   Refills: 0       Active    cholecalciferol (Vitamin D-3) 50 mcg (2,000 unit) capsule oral, Daily RT    cyanocobalamin, vitamin B-12, 2,500 mcg tablet, sublingual sublingual, Daily RT    metoprolol succinate XL (TOPROL-XL) 50 mg, oral, Daily    nitroglycerin (Nitrostat) 0.4 mg SL tablet sublingual        Vitals reviewed: Visit Vitals  /78   Pulse 67       Physical Exam:   General:  Patient is awake, alert, and oriented.  Patient is in no acute distress.  HEENT:  Pupils equal and reactive.  Normocephalic.  Moist mucosa.    Neck:  No thyromegaly.  Normal Jugular Venous Pressure.  Cardiovascular:  Regular rate and rhythm.  Normal S1 and S2.  1/6 SAMEER.  Pulmonary:  Clear to auscultation bilaterally.  Abdomen:  Soft. Non-tender.   Non-distended.  Positive bowel sounds.  Lower Extremities:  2+ pedal pulses. No LE edema.  Neurologic:  Cranial nerves intact.  No focal deficit.   Skin: Skin warm and dry, normal skin turgor.   Psychiatric: Normal affect.    Last Labs:  CBC -      Lab Results   Component Value Date    WBC 8.0 08/07/2023    HGB 12.8 08/07/2023    HCT 41.2 08/07/2023     08/07/2023        CMP-  Lab Results   Component Value Date    GLUCOSE 95 08/07/2023     08/07/2023    K 4.1 08/07/2023     08/07/2023    CO2 29 08/07/2023    ANIONGAP 12 08/07/2023    BUN 13 08/07/2023    CREATININE 0.81 08/07/2023    CALCIUM 10.0 08/07/2023    PROT 7.4 08/07/2023    ALBUMIN 4.1 08/07/2023    AST 20 08/07/2023    ALT 12 08/07/2023    ALKPHOS 46 08/07/2023    BILITOT 0.6 08/07/2023        LIPIDS-  Lab Results   Component Value Date    CHOL 190 08/07/2023    TRIG 82 08/07/2023     "HDL 72.7 08/07/2023    CHHDL 2.6 08/07/2023    VLDL 16 08/07/2023        OTHERS-  No results found for: \"HGBA1C\", \"BNP\"     I personally reviewed the patient's recent vitals, labs, medications, orders, EKGs, pertinent cardiac imaging/ echocardiography and ischemic evaluations including stress testing.    Assessment and Plan:    Abena Shaw is a 73 y.o. female with pertinent history of dyslipidemia, prior smoking history, fatigue, esophagitis, memory loss, osteopenia, vitamin D deficiency, history of Renea-Parkinson-White status post ablation in 2014, preserved ejection fraction with impaired relaxation echo performed 4/21/2023, no clear ischemia nuclear stress test performed 5/1/2023, no obstructive carotid disease on Dopplers performed 5/25/2023 presents to cardiology clinic for follow-up. She is accompanied by her  who has questions and provides history.  She is doing relatively well.   She has had minimal palpitations.  Dyspnea on exertion is stable.  She does note she has not been as active as normal but mainly relates this to the winter weather.  We reviewed and discussed her prior echo, stress test, carotid Dopplers.    Please continue current cardiac medications including aspirin 81 mg daily and metoprolol succinate 50 mg daily.    Please followup with me in Cardiology clinic within the next 1 year.  Please return to clinic sooner or seek emergent care if your symptoms reoccur or worsen.    Thank you for allowing me to participate in their care.  Please feel free to call me with any further questions or concerns.        Gutierrez Willard MD, FACC, MELA FINN  Division of Cardiovascular Medicine  Medical Director, [facility] [place]  Clinical , [employer]  [email or web address]   Office:  [phone]       "

## 2024-05-06 DIAGNOSIS — I47.10 SUPRAVENTRICULAR TACHYCARDIA, PAROXYSMAL (CMS-HCC): Primary | ICD-10-CM

## 2024-05-06 RX ORDER — METOPROLOL SUCCINATE 50 MG/1
50 TABLET, EXTENDED RELEASE ORAL DAILY
Qty: 90 TABLET | Refills: 3 | Status: SHIPPED | OUTPATIENT
Start: 2024-05-06

## 2024-05-13 ENCOUNTER — OFFICE VISIT (OUTPATIENT)
Dept: PRIMARY CARE | Facility: CLINIC | Age: 74
End: 2024-05-13
Payer: MEDICARE

## 2024-05-13 VITALS
HEART RATE: 65 BPM | WEIGHT: 95.25 LBS | HEIGHT: 61 IN | BODY MASS INDEX: 17.98 KG/M2 | RESPIRATION RATE: 18 BRPM | SYSTOLIC BLOOD PRESSURE: 150 MMHG | DIASTOLIC BLOOD PRESSURE: 86 MMHG | OXYGEN SATURATION: 96 %

## 2024-05-13 DIAGNOSIS — R41.3 MEMORY LOSS: ICD-10-CM

## 2024-05-13 DIAGNOSIS — Z12.31 VISIT FOR SCREENING MAMMOGRAM: ICD-10-CM

## 2024-05-13 DIAGNOSIS — E78.5 DYSLIPIDEMIA: ICD-10-CM

## 2024-05-13 DIAGNOSIS — Z00.00 ROUTINE GENERAL MEDICAL EXAMINATION AT HEALTH CARE FACILITY: Primary | ICD-10-CM

## 2024-05-13 DIAGNOSIS — M85.859 OSTEOPENIA OF NECK OF FEMUR, UNSPECIFIED LATERALITY: ICD-10-CM

## 2024-05-13 DIAGNOSIS — F17.210 NICOTINE DEPENDENCE, CIGARETTES, UNCOMPLICATED: ICD-10-CM

## 2024-05-13 DIAGNOSIS — Z87.891 FORMER SMOKER: ICD-10-CM

## 2024-05-13 DIAGNOSIS — E55.9 VITAMIN D DEFICIENCY: ICD-10-CM

## 2024-05-13 DIAGNOSIS — R10.32 LLQ PAIN: ICD-10-CM

## 2024-05-13 DIAGNOSIS — R05.3 CHRONIC COUGH: ICD-10-CM

## 2024-05-13 LAB
25(OH)D3 SERPL-MCNC: 66 NG/ML (ref 30–100)
ALBUMIN SERPL BCP-MCNC: 3.9 G/DL (ref 3.4–5)
ALP SERPL-CCNC: 43 U/L (ref 33–136)
ALT SERPL W P-5'-P-CCNC: 9 U/L (ref 7–45)
ANION GAP SERPL CALC-SCNC: 12 MMOL/L (ref 10–20)
AST SERPL W P-5'-P-CCNC: 15 U/L (ref 9–39)
BILIRUB SERPL-MCNC: 0.5 MG/DL (ref 0–1.2)
BUN SERPL-MCNC: 17 MG/DL (ref 6–23)
CALCIUM SERPL-MCNC: 9.4 MG/DL (ref 8.6–10.6)
CHLORIDE SERPL-SCNC: 105 MMOL/L (ref 98–107)
CHOLEST SERPL-MCNC: 182 MG/DL (ref 0–199)
CHOLESTEROL/HDL RATIO: 2.5
CO2 SERPL-SCNC: 28 MMOL/L (ref 21–32)
CREAT SERPL-MCNC: 0.95 MG/DL (ref 0.5–1.05)
EGFRCR SERPLBLD CKD-EPI 2021: 63 ML/MIN/1.73M*2
ERYTHROCYTE [DISTWIDTH] IN BLOOD BY AUTOMATED COUNT: 12.7 % (ref 11.5–14.5)
GLUCOSE SERPL-MCNC: 113 MG/DL (ref 74–99)
HCT VFR BLD AUTO: 46.4 % (ref 36–46)
HDLC SERPL-MCNC: 71.4 MG/DL
HGB BLD-MCNC: 13.9 G/DL (ref 12–16)
LDLC SERPL CALC-MCNC: 97 MG/DL
MCH RBC QN AUTO: 30 PG (ref 26–34)
MCHC RBC AUTO-ENTMCNC: 30 G/DL (ref 32–36)
MCV RBC AUTO: 100 FL (ref 80–100)
NON HDL CHOLESTEROL: 111 MG/DL (ref 0–149)
NRBC BLD-RTO: 0 /100 WBCS (ref 0–0)
PLATELET # BLD AUTO: 298 X10*3/UL (ref 150–450)
POTASSIUM SERPL-SCNC: 4.1 MMOL/L (ref 3.5–5.3)
PROT SERPL-MCNC: 7.2 G/DL (ref 6.4–8.2)
RBC # BLD AUTO: 4.64 X10*6/UL (ref 4–5.2)
SODIUM SERPL-SCNC: 141 MMOL/L (ref 136–145)
TRIGL SERPL-MCNC: 68 MG/DL (ref 0–149)
TSH SERPL-ACNC: 2.18 MIU/L (ref 0.44–3.98)
VIT B12 SERPL-MCNC: >2000 PG/ML (ref 211–911)
VLDL: 14 MG/DL (ref 0–40)
WBC # BLD AUTO: 8.8 X10*3/UL (ref 4.4–11.3)

## 2024-05-13 PROCEDURE — 99406 BEHAV CHNG SMOKING 3-10 MIN: CPT | Performed by: FAMILY MEDICINE

## 2024-05-13 PROCEDURE — 84443 ASSAY THYROID STIM HORMONE: CPT

## 2024-05-13 PROCEDURE — 36415 COLL VENOUS BLD VENIPUNCTURE: CPT

## 2024-05-13 PROCEDURE — 80053 COMPREHEN METABOLIC PANEL: CPT

## 2024-05-13 PROCEDURE — 99397 PER PM REEVAL EST PAT 65+ YR: CPT | Performed by: FAMILY MEDICINE

## 2024-05-13 PROCEDURE — 1170F FXNL STATUS ASSESSED: CPT | Performed by: FAMILY MEDICINE

## 2024-05-13 PROCEDURE — 1159F MED LIST DOCD IN RCRD: CPT | Performed by: FAMILY MEDICINE

## 2024-05-13 PROCEDURE — 80061 LIPID PANEL: CPT

## 2024-05-13 PROCEDURE — 1160F RVW MEDS BY RX/DR IN RCRD: CPT | Performed by: FAMILY MEDICINE

## 2024-05-13 PROCEDURE — G0439 PPPS, SUBSEQ VISIT: HCPCS | Performed by: FAMILY MEDICINE

## 2024-05-13 PROCEDURE — 82306 VITAMIN D 25 HYDROXY: CPT

## 2024-05-13 PROCEDURE — G0444 DEPRESSION SCREEN ANNUAL: HCPCS | Performed by: FAMILY MEDICINE

## 2024-05-13 PROCEDURE — 1124F ACP DISCUSS-NO DSCNMKR DOCD: CPT | Performed by: FAMILY MEDICINE

## 2024-05-13 PROCEDURE — 82607 VITAMIN B-12: CPT

## 2024-05-13 PROCEDURE — 85027 COMPLETE CBC AUTOMATED: CPT

## 2024-05-13 PROCEDURE — 84207 ASSAY OF VITAMIN B-6: CPT

## 2024-05-13 ASSESSMENT — PATIENT HEALTH QUESTIONNAIRE - PHQ9
1. LITTLE INTEREST OR PLEASURE IN DOING THINGS: NOT AT ALL
SUM OF ALL RESPONSES TO PHQ9 QUESTIONS 1 AND 2: 0
2. FEELING DOWN, DEPRESSED OR HOPELESS: NOT AT ALL

## 2024-05-13 ASSESSMENT — ANXIETY QUESTIONNAIRES
6. BECOMING EASILY ANNOYED OR IRRITABLE: NOT AT ALL
GAD7 TOTAL SCORE: 0
4. TROUBLE RELAXING: NOT AT ALL
1. FEELING NERVOUS, ANXIOUS, OR ON EDGE: NOT AT ALL
IF YOU CHECKED OFF ANY PROBLEMS ON THIS QUESTIONNAIRE, HOW DIFFICULT HAVE THESE PROBLEMS MADE IT FOR YOU TO DO YOUR WORK, TAKE CARE OF THINGS AT HOME, OR GET ALONG WITH OTHER PEOPLE: NOT DIFFICULT AT ALL
5. BEING SO RESTLESS THAT IT IS HARD TO SIT STILL: NOT AT ALL
2. NOT BEING ABLE TO STOP OR CONTROL WORRYING: NOT AT ALL
7. FEELING AFRAID AS IF SOMETHING AWFUL MIGHT HAPPEN: NOT AT ALL
3. WORRYING TOO MUCH ABOUT DIFFERENT THINGS: NOT AT ALL

## 2024-05-13 ASSESSMENT — ENCOUNTER SYMPTOMS
OCCASIONAL FEELINGS OF UNSTEADINESS: 0
DEPRESSION: 0
LOSS OF SENSATION IN FEET: 0

## 2024-05-13 NOTE — PROGRESS NOTES
"Subjective   Reason for Visit: Abena Shaw is an 73 y.o. female here for a Medicare Wellness visit.     Past Medical, Surgical, and Family History reviewed and updated in chart.         HPI   Patient states she has been losing weight her appetite is poor had a CT scan at 2021 and has had lower abdominal pain that has been getting worse occasional nausea denies being constipated up-to-date colonoscopy needs a mammogram ordered bone density order is currently a smoker   has noted that the patient is losing her memory frequently forgetful has also been dizzy  Patient Care Team:  Phillip Mendosa MD as PCP - General  Phillip Mendosa MD as PCP - [employer] PCP     Review of Systems   Constitutional:  Positive for unexpected weight change. Negative for chills and fever.   HENT: Negative.     Respiratory: Negative.     Cardiovascular: Negative.    Gastrointestinal:  Positive for abdominal pain. Negative for nausea and vomiting.   Endocrine: Negative.    Genitourinary: Negative.    Musculoskeletal:  Positive for arthralgias.   Skin: Negative.  Negative for rash.   Allergic/Immunologic: Negative.    Neurological:  Positive for weakness.   Hematological: Negative.    Psychiatric/Behavioral:  Positive for confusion.    All other systems reviewed and are negative.      Objective   Vitals:  /86 (BP Location: Left arm, Patient Position: Sitting, BP Cuff Size: Small adult)   Pulse 65   Resp 18   Ht 1.549 m (5' 1\")   Wt (!) 43.2 kg (95 lb 4 oz)   SpO2 96%   BMI 18.00 kg/m²       Physical Exam  Constitutional:       General: She is not in acute distress.  HENT:      Right Ear: Tympanic membrane normal.      Left Ear: Tympanic membrane normal.   Eyes:      Extraocular Movements: Extraocular movements intact.   Cardiovascular:      Rate and Rhythm: Normal rate and regular rhythm.   Pulmonary:      Breath sounds: Normal breath sounds.   Abdominal:      Tenderness: There is abdominal tenderness. "      Comments: Left lower quadrant tenderness   Musculoskeletal:         General: Normal range of motion.      Cervical back: No rigidity.   Skin:     Findings: No rash.   Neurological:      Mental Status: She is alert. She is disoriented.      Gait: Gait abnormal.   Psychiatric:         Thought Content: Thought content normal.         Assessment/Plan   Problem List Items Addressed This Visit       Cough    Relevant Orders    Vitamin D 25-Hydroxy,Total (for eval of Vitamin D levels) (Completed)    TSH with reflex to Free T4 if abnormal (Completed)    Lipid Panel (Completed)    Comprehensive Metabolic Panel (Completed)    CBC (Completed)    Vitamin B12 (Completed)    Vitamin B6 (Completed)    Dyslipidemia    Relevant Orders    Vitamin D 25-Hydroxy,Total (for eval of Vitamin D levels) (Completed)    TSH with reflex to Free T4 if abnormal (Completed)    Lipid Panel (Completed)    Comprehensive Metabolic Panel (Completed)    CBC (Completed)    Vitamin B12 (Completed)    Vitamin B6 (Completed)    Memory loss    Relevant Orders    MR brain w and wo IV contrast    Vitamin D 25-Hydroxy,Total (for eval of Vitamin D levels) (Completed)    TSH with reflex to Free T4 if abnormal (Completed)    Lipid Panel (Completed)    Comprehensive Metabolic Panel (Completed)    CBC (Completed)    Vitamin B12 (Completed)    Vitamin B6 (Completed)    Osteopenia    Relevant Orders    Vitamin D 25-Hydroxy,Total (for eval of Vitamin D levels) (Completed)    TSH with reflex to Free T4 if abnormal (Completed)    Lipid Panel (Completed)    Comprehensive Metabolic Panel (Completed)    CBC (Completed)    Vitamin B12 (Completed)    Vitamin B6 (Completed)    Vitamin D deficiency    Relevant Orders    Vitamin D 25-Hydroxy,Total (for eval of Vitamin D levels) (Completed)    TSH with reflex to Free T4 if abnormal (Completed)    Lipid Panel (Completed)    Comprehensive Metabolic Panel (Completed)    CBC (Completed)    Vitamin B12 (Completed)    Vitamin B6  "(Completed)    Visit for screening mammogram    Relevant Orders    BI mammo bilateral screening tomosynthesis    Vitamin D 25-Hydroxy,Total (for eval of Vitamin D levels) (Completed)    TSH with reflex to Free T4 if abnormal (Completed)    Lipid Panel (Completed)    Comprehensive Metabolic Panel (Completed)    CBC (Completed)    Vitamin B12 (Completed)    Vitamin B6 (Completed)    Routine general medical examination at health care facility - Primary    Former smoker    Relevant Orders    CT lung screening low dose    Vitamin D 25-Hydroxy,Total (for eval of Vitamin D levels) (Completed)    TSH with reflex to Free T4 if abnormal (Completed)    Lipid Panel (Completed)    Comprehensive Metabolic Panel (Completed)    CBC (Completed)    Vitamin B12 (Completed)    Vitamin B6 (Completed)    LLQ pain    Relevant Orders    CT abdomen pelvis w IV contrast    Vitamin D 25-Hydroxy,Total (for eval of Vitamin D levels) (Completed)    TSH with reflex to Free T4 if abnormal (Completed)    Lipid Panel (Completed)    Comprehensive Metabolic Panel (Completed)    CBC (Completed)    Vitamin B12 (Completed)    Vitamin B6 (Completed)    Nicotine dependence, cigarettes, uncomplicated    spent 15 minutes obtaining and discussing depression screening using PHQ-2 questions with results documented in chart.”  (If screen positive: \"The screen indicated potential depression and PHQ-9 was obtained with treatment and referral plan discussed      I spent more than 3 minutes (but less than 10 minutes) counseling patient about need for cigarette smoking/tobacco cessation and how I can support efforts when patient is ready to quit.  Discussed nicotine replacement therapy, Varenicline, Bupropion, hypnosis, support groups, and acupuncture as potential options.  Patient currently has no signs or symptoms of tobacco related disease.    "

## 2024-05-16 LAB — PYRIDOXAL PHOS SERPL-SCNC: 19.8 NMOL/L (ref 20–125)

## 2024-05-19 DIAGNOSIS — E53.1 VITAMIN B6 DEFICIENCY: Primary | ICD-10-CM

## 2024-05-19 PROBLEM — Z87.891 FORMER SMOKER: Status: ACTIVE | Noted: 2024-05-19

## 2024-05-19 PROBLEM — F17.210 NICOTINE DEPENDENCE, CIGARETTES, UNCOMPLICATED: Status: ACTIVE | Noted: 2024-05-19

## 2024-05-19 PROBLEM — R10.32 LLQ PAIN: Status: ACTIVE | Noted: 2024-05-19

## 2024-05-19 PROBLEM — Z00.00 ROUTINE GENERAL MEDICAL EXAMINATION AT A HEALTH CARE FACILITY: Status: ACTIVE | Noted: 2024-05-19

## 2024-05-19 RX ORDER — PYRIDOXINE HCL (VITAMIN B6) 100 MG
100 TABLET ORAL DAILY
Qty: 90 TABLET | Refills: 3 | Status: SHIPPED | OUTPATIENT
Start: 2024-05-19 | End: 2025-05-19

## 2024-05-19 ASSESSMENT — ENCOUNTER SYMPTOMS
VOMITING: 0
NAUSEA: 0
ABDOMINAL PAIN: 1
CONFUSION: 1
FEVER: 0
ALLERGIC/IMMUNOLOGIC NEGATIVE: 1
ARTHRALGIAS: 1
UNEXPECTED WEIGHT CHANGE: 1
RESPIRATORY NEGATIVE: 1
WEAKNESS: 1
CHILLS: 0
HEMATOLOGIC/LYMPHATIC NEGATIVE: 1
ENDOCRINE NEGATIVE: 1
CARDIOVASCULAR NEGATIVE: 1

## 2024-05-19 ASSESSMENT — ACTIVITIES OF DAILY LIVING (ADL)
TAKING_MEDICATION: INDEPENDENT
DOING_HOUSEWORK: INDEPENDENT
MANAGING_FINANCES: INDEPENDENT
BATHING: INDEPENDENT
DRESSING: INDEPENDENT
GROCERY_SHOPPING: INDEPENDENT

## 2024-05-30 ENCOUNTER — HOSPITAL ENCOUNTER (OUTPATIENT)
Dept: RADIOLOGY | Facility: HOSPITAL | Age: 74
Discharge: HOME | End: 2024-05-30
Payer: MEDICARE

## 2024-05-30 DIAGNOSIS — R41.3 MEMORY LOSS: ICD-10-CM

## 2024-05-30 DIAGNOSIS — R10.32 LLQ PAIN: ICD-10-CM

## 2024-05-30 DIAGNOSIS — Z87.891 FORMER SMOKER: ICD-10-CM

## 2024-05-30 PROCEDURE — 70553 MRI BRAIN STEM W/O & W/DYE: CPT

## 2024-05-30 PROCEDURE — 71271 CT THORAX LUNG CANCER SCR C-: CPT

## 2024-05-30 PROCEDURE — 74177 CT ABD & PELVIS W/CONTRAST: CPT | Performed by: RADIOLOGY

## 2024-05-30 PROCEDURE — 2550000001 HC RX 255 CONTRASTS: Performed by: FAMILY MEDICINE

## 2024-05-30 PROCEDURE — A9575 INJ GADOTERATE MEGLUMI 0.1ML: HCPCS | Performed by: FAMILY MEDICINE

## 2024-05-30 PROCEDURE — 70553 MRI BRAIN STEM W/O & W/DYE: CPT | Performed by: RADIOLOGY

## 2024-05-30 PROCEDURE — 74177 CT ABD & PELVIS W/CONTRAST: CPT

## 2024-05-30 RX ORDER — GADOTERATE MEGLUMINE 376.9 MG/ML
8 INJECTION INTRAVENOUS
Status: COMPLETED | OUTPATIENT
Start: 2024-05-30 | End: 2024-05-30

## 2024-05-30 RX ADMIN — GADOTERATE MEGLUMINE 8 ML: 376.9 INJECTION INTRAVENOUS at 09:22

## 2024-05-30 RX ADMIN — IOHEXOL 75 ML: 350 INJECTION, SOLUTION INTRAVENOUS at 08:12

## 2024-05-31 DIAGNOSIS — A31.0: Primary | ICD-10-CM

## 2024-06-06 DIAGNOSIS — R91.8 LUNG INFILTRATE ON CT: Primary | ICD-10-CM

## 2024-06-06 DIAGNOSIS — Z01.818 PRE-OP TESTING: ICD-10-CM

## 2024-06-06 NOTE — PROGRESS NOTES
Bronchoscopy Scheduling Request    Pre-bronchoscopy visit: New patient visit with Bronchoscopy group provider  Please schedule procedure: ASAP    Cytology on-site:  No  Location:  Either location  Performing physician:  Advanced diagnostic bronchoscopist  Referring physician:  Phillip Mendosa MD  Indication:  Infiltrates, night sweats- workup for infectious etiology, including NTM  Sedation / Anesthesia:  GA  Procedure:  BAL  Time:  Tier 1  Fluorscopy:   No  Imaging needed:  None  Labs:  CBC, BMP- ok 5/13/2024  Meds:  None  Special Considerations:  None  Reviewed by:  uArelia Marshall MD

## 2024-06-10 ENCOUNTER — TELEPHONE (OUTPATIENT)
Dept: PRIMARY CARE | Facility: CLINIC | Age: 74
End: 2024-06-10
Payer: MEDICARE

## 2024-06-14 ENCOUNTER — LAB (OUTPATIENT)
Dept: LAB | Facility: LAB | Age: 74
End: 2024-06-14
Payer: MEDICARE

## 2024-06-14 DIAGNOSIS — Z01.818 PRE-OP TESTING: ICD-10-CM

## 2024-06-14 DIAGNOSIS — R91.8 LUNG INFILTRATE ON CT: ICD-10-CM

## 2024-06-14 LAB
ANION GAP SERPL CALC-SCNC: 15 MMOL/L (ref 10–20)
BUN SERPL-MCNC: 14 MG/DL (ref 6–23)
CALCIUM SERPL-MCNC: 10.3 MG/DL (ref 8.6–10.6)
CHLORIDE SERPL-SCNC: 99 MMOL/L (ref 98–107)
CO2 SERPL-SCNC: 29 MMOL/L (ref 21–32)
CREAT SERPL-MCNC: 0.87 MG/DL (ref 0.5–1.05)
EGFRCR SERPLBLD CKD-EPI 2021: 70 ML/MIN/1.73M*2
ERYTHROCYTE [DISTWIDTH] IN BLOOD BY AUTOMATED COUNT: 12.3 % (ref 11.5–14.5)
GLUCOSE SERPL-MCNC: 81 MG/DL (ref 74–99)
HCT VFR BLD AUTO: 42.4 % (ref 36–46)
HGB BLD-MCNC: 13.6 G/DL (ref 12–16)
MCH RBC QN AUTO: 30.4 PG (ref 26–34)
MCHC RBC AUTO-ENTMCNC: 32.1 G/DL (ref 32–36)
MCV RBC AUTO: 95 FL (ref 80–100)
NRBC BLD-RTO: 0 /100 WBCS (ref 0–0)
PLATELET # BLD AUTO: 321 X10*3/UL (ref 150–450)
POTASSIUM SERPL-SCNC: 4.8 MMOL/L (ref 3.5–5.3)
RBC # BLD AUTO: 4.48 X10*6/UL (ref 4–5.2)
SODIUM SERPL-SCNC: 138 MMOL/L (ref 136–145)
WBC # BLD AUTO: 9.2 X10*3/UL (ref 4.4–11.3)

## 2024-06-14 PROCEDURE — 80048 BASIC METABOLIC PNL TOTAL CA: CPT

## 2024-06-14 PROCEDURE — 85027 COMPLETE CBC AUTOMATED: CPT

## 2024-06-14 PROCEDURE — 36415 COLL VENOUS BLD VENIPUNCTURE: CPT

## 2024-06-20 NOTE — PROGRESS NOTES
Patient: Abena Shaw    [id number]  : 1950 -- AGE 73 y.o.    Provider: JUNE Knott     Location [facility]   Service Date: 2024              [facility]  New Visit Note      HISTORY OF PRESENT ILLNESS     The patient's referring provider is: No ref. provider found    HISTORY OF PRESENT ILLNESS   Abena Shaw is a 73 y.o. female who presents to a [facility] for an evaluation with concerns of No chief complaint on file.. I have independently interviewed and examined the patient in the office and reviewed available records.    Current History    Dr. Mendosa PCP reached out ot Dr. Marshall     On today's visit, the patient reports she has had a productive cough for several months. That was why her PCP got the imaging.  She states her mucous is yellow/green. She denies wheezing, SOB at rest, GERD, or CP. Previous palpitation she had some chest discomfort. She has COVINGTON with going up stairs. She has a runny nose, but denies any post nasal drip or sneezing.      Previous pulmonary history: She has no history of recurrent infections, or lung disease as a child.  She had no previous lung hx, never on oxygen or inhaler therapy.     Inhalers/nebulized medications: none     Hospitalization History: She has not been hospitalized over the last year for breathing related problem.      ALLERGIES AND MEDICATIONS     ALLERGIES  No Known Allergies    MEDICATIONS  Current Outpatient Medications   Medication Sig Dispense Refill    aspirin 81 mg EC tablet Take 1 tablet (81 mg) by mouth once daily.      CALCIUM ORAL Calcium TABS   Refills: 0       Active      cholecalciferol (Vitamin D-3) 50 mcg (2,000 unit) capsule Take by mouth once daily.      cyanocobalamin, vitamin B-12, 2,500 mcg tablet, sublingual Place under the tongue once daily.      metoprolol succinate XL (Toprol-XL) 50 mg 24 hr tablet Take  1 tablet (50 mg) by mouth once daily. 90 tablet 3    pyridoxine (Vitamin B-6) 100 mg tablet Take 1 tablet (100 mg) by mouth once daily. 90 tablet 3     No current facility-administered medications for this visit.         PAST HISTORY     PAST MEDICAL HISTORY  - SVT - woff parkinson white - previous ablation     PAST SURGICAL HISTORY  Past Surgical History:   Procedure Laterality Date    BREAST SURGERY      augmentation    CARDIAC ELECTROPHYSIOLOGY STUDY AND ABLATION      CHOLECYSTECTOMY  10/15/2015    Cholecystectomy Laparoscopic       IMMUNIZATION HISTORY  Immunization History   Administered Date(s) Administered    Flu vaccine (IIV4), preservative free *Check age/dose* 10/25/2021    Influenza, seasonal, injectable 10/25/2021    Pfizer Purple Cap SARS-CoV-2 2021, 04/15/2021    Tdap vaccine, age 7 year and older (BOOSTRIX, ADACEL) 2022       SOCIAL HISTORY  Smokin- 70 1-1.5ppd ~ 45-65 pack years   Alcohol: none   Illicit drugs: none      OCCUPATIONAL/ENVIRONMENTAL HISTORY  Retired - previously worked at a bank, doctors office,      FAMILY HISTORY  Sister with COPD     RESULTS/DATA     Pulmonary Function Test Results     None on record     Chest Radiograph     CHEST 2 VIEW 2021  Impression  No acute cardiopulmonary process.      No orders to display        Chest CT Scan     CT lung screening low dose 2024  Impression  1. Underlying emphysema and generalized airway thickening. Airways  disease which is essentially localized to the right middle lobe and  lingula, compatible with non tuberculous mycobacterial infection. If  this is not a known diagnosis, advise correlation with sputum  analysis. Low-dose CT follow-up advised in 6 months to reassess.  2. Estimated coronary artery calcium score is 194*.  3. Bilateral mastectomies with implant reconstruction. Both implants  have peripheral coarse calcification.         Echocardiogram     Echo:   21 - The left ventricular systolic  "function is normal with a 65-70% estimated ejection fraction.2. RVSP within normal limits. RA normal size, RV normal size and function     4/21/23 - Left ventricular systolic function is normal with a 60-65% estimated ejection fraction.  2. Spectral Doppler shows an impaired relaxation pattern of left ventricular diastolic filling. RA normal size, RV normal size and function      Other testing/ Labs      Eosinophils Absolute (x10E9/L)   Date Value   07/02/2021 0.00     No results found for: \"IGE\"    REVIEW OF SYSTEMS     REVIEW OF SYSTEMS  Review of Systems- negative except what's noted in HPI       PHYSICAL EXAM     VITAL SIGNS: There were no vitals taken for this visit.     CURRENT WEIGHT: [unfilled]  BMI: [unfilled]  PREVIOUS WEIGHTS:  Wt Readings from Last 3 Encounters:   05/13/24 (!) 43.2 kg (95 lb 4 oz)   02/15/24 (!) 44.9 kg (99 lb)   11/02/23 (!) 43.5 kg (96 lb)       Physical Exam - virtual visit     ASSESSMENT/PLAN     Patient's visit was converted to a virtual visit.    1. Concern for atypical infection:   - Plan for bronchoscopy with biopsy   - Lab work prior to procedure - already completed   - Not on any anticoagulation     I explained the procedure to the patient. We discussed that the bronchoscopy will be performed by a member of the Interventional Pulmonary Team; depending on scheduling and provider availability. We also discussed that the IP providers function as a team and not infrequently may have to fill in for one another if there are emergent issues that need attention at the same time. The patient / family expressed understanding and agreed to proceed. All questions were answered.     Patient's visit was converted to a virtual visit. Spoke with the patient via phone for 12 minutes.    Prep: 10 minutes  Phone/Video: 12 minutes  Total: 22 minutes            "

## 2024-06-21 ENCOUNTER — TELEMEDICINE (OUTPATIENT)
Dept: PULMONOLOGY | Facility: HOSPITAL | Age: 74
End: 2024-06-21
Payer: MEDICARE

## 2024-06-21 ENCOUNTER — ANESTHESIA EVENT (OUTPATIENT)
Dept: GASTROENTEROLOGY | Facility: HOSPITAL | Age: 74
End: 2024-06-21
Payer: MEDICARE

## 2024-06-21 DIAGNOSIS — R91.8 LUNG INFILTRATE ON CT: Primary | ICD-10-CM

## 2024-06-21 PROBLEM — I45.6 WPW (WOLFF-PARKINSON-WHITE SYNDROME): Status: RESOLVED | Noted: 2023-03-03 | Resolved: 2024-06-21

## 2024-06-21 PROBLEM — R94.31 ABNORMAL EKG: Status: ACTIVE | Noted: 2024-06-21

## 2024-06-21 PROBLEM — Z86.79 H/O PAROXYSMAL SUPRAVENTRICULAR TACHYCARDIA: Status: ACTIVE | Noted: 2024-06-21

## 2024-06-21 PROCEDURE — 99202 OFFICE O/P NEW SF 15 MIN: CPT | Performed by: NURSE PRACTITIONER

## 2024-06-21 PROCEDURE — 1123F ACP DISCUSS/DSCN MKR DOCD: CPT | Performed by: NURSE PRACTITIONER

## 2024-06-21 NOTE — ANESTHESIA PREPROCEDURE EVALUATION
Patient: Abena Shaw    Procedure Information       Date/Time: 06/24/24 0730    Scheduled providers: Aurelia BYRD MD    Procedure: BRONCHOSCOPY    Location: [facility]                                                           Pre-Anesthesia Evaluation      Abena Shaw is a 73 y.o. female with history of persistent cough, ablation for WPW syndrome, memory loss, dyslipidemia, prediabetes and who presents for bronchoscopy.      Past Medical History:   Diagnosis Date    Anxiety     Esophagitis     H/O paroxysmal supraventricular tachycardia 06/21/2024    Hyperlipidemia     Other specified noninflammatory disorders of vulva and perineum 04/17/2017    Vulvar lesion    Prediabetes     Reactive airway disease ([facility])     WPW (Renea-Parkinson-White syndrome)     s/p ablation 2014     Past Surgical History:   Procedure Laterality Date    BREAST SURGERY      augmentation    CARDIAC ELECTROPHYSIOLOGY STUDY AND ABLATION      CHOLECYSTECTOMY  10/15/2015    Cholecystectomy Laparoscopic     Social History reviewed  She reports that she quit smoking about 5 years ago. Her smoking use included cigarettes. She started smoking about 56 years ago. She has a 63.8 pack-year smoking history. She has been exposed to tobacco smoke. She has never used smokeless tobacco. She reports that she does not drink alcohol and does not use drugs.  Allergies reviewed  No Known Allergies  Current Outpatient Medications   Medication Instructions    aspirin 81 mg, oral, Daily    CALCIUM ORAL Calcium TABS   Refills: 0       Active    cholecalciferol (Vitamin D-3) 50 mcg (2,000 unit) capsule oral, Daily RT    cyanocobalamin, vitamin B-12, 2,500 mcg tablet, sublingual sublingual, Daily RT    metoprolol succinate XL (TOPROL-XL) 50 mg, oral, Daily    pyridoxine (VITAMIN B-6) 100 mg, oral, Daily       Lab Results   Component Value Date/Time    WBC 9.2 06/14/2024 1309    HGB 13.6 06/14/2024 1309    HCT 42.4 06/14/2024 1309  "    06/14/2024 1309       Chemistry    Lab Results   Component Value Date/Time     06/14/2024 1309    K 4.8 06/14/2024 1309    CL 99 06/14/2024 1309    CO2 29 06/14/2024 1309    BUN 14 06/14/2024 1309    CREATININE 0.87 06/14/2024 1309    Lab Results   Component Value Date/Time    CALCIUM 10.3 06/14/2024 1309    ALKPHOS 43 05/13/2024 0916    AST 15 05/13/2024 0916    ALT 9 05/13/2024 0916    BILITOT 0.5 05/13/2024 0916          Recent Labs     05/13/24  0916 08/07/23  1745 06/20/22  1433   TSH 2.18 1.70 3.38     Stress 5/1/2023:   Negative myocardial perfusion study without evidence of inducible  ischemia or prior infarction.  The left ventricle is normal in size.  Normal LV wall motion with a post-stress LV EF estimated at greater  than 65%.     Echo 4/21/2023:  1. Left ventricular systolic function is normal with a 60-65% estimated ejection fraction.  2. Spectral Doppler shows an impaired relaxation pattern of left ventricular diastolic filling.  Visit Vitals  /71   Pulse 55   Temp 37.2 °C (99 °F)   Resp 20   Ht 1.549 m (5' 1\")   Wt (!) 40.8 kg (89 lb 15.2 oz)   SpO2 100%   Breastfeeding No   BMI 17.00 kg/m²   OB Status Postmenopausal   Smoking Status Former   BSA 1.32 m²             Relevant Problems   Anesthesia (within normal limits)      Pulmonary   (+) COVINGTON (dyspnea on exertion)      Neuro   (+) Anxiety      Endocrine   (+) Prediabetes      Musculoskeletal   (+) TMJ arthropathy      Tobacco   (+) Former smoker       Clinical information reviewed:   Tobacco  Allergies  Meds   Med Hx  Surg Hx  OB Status  Fam Hx  Soc   Hx        NPO Detail:  NPO/Void Status  Carbohydrate Drink Given Prior to Surgery? : N  Date of Last Liquid: 06/23/24  Time of Last Liquid: 1700  Date of Last Solid: 06/23/24  Time of Last Solid: 1700  Last Intake Type: Clear fluids (water)  Time of Last Void: 0600         Physical Exam    Airway  Mallampati: III  TM distance: <3 FB  Neck ROM: full     Cardiovascular "   Rhythm: regular  Rate: normal     Dental        Pulmonary   Breath sounds clear to auscultation     Abdominal            Anesthesia Plan    History of general anesthesia?: yes  History of complications of general anesthesia?: no    ASA 3     general     The patient is a current smoker.  Patient was previously instructed to abstain from smoking on day of procedure.  Patient did not smoke on day of procedure.    intravenous induction   Anesthetic plan and risks discussed with patient.    Plan discussed with CRNA and CAA.

## 2024-06-24 ENCOUNTER — HOSPITAL ENCOUNTER (OUTPATIENT)
Dept: GASTROENTEROLOGY | Facility: HOSPITAL | Age: 74
Discharge: HOME | End: 2024-06-24
Payer: MEDICARE

## 2024-06-24 ENCOUNTER — ANESTHESIA (OUTPATIENT)
Dept: GASTROENTEROLOGY | Facility: HOSPITAL | Age: 74
End: 2024-06-24
Payer: MEDICARE

## 2024-06-24 VITALS
HEIGHT: 61 IN | SYSTOLIC BLOOD PRESSURE: 126 MMHG | RESPIRATION RATE: 20 BRPM | DIASTOLIC BLOOD PRESSURE: 61 MMHG | WEIGHT: 89.95 LBS | HEART RATE: 57 BPM | TEMPERATURE: 99.3 F | BODY MASS INDEX: 16.98 KG/M2 | OXYGEN SATURATION: 97 %

## 2024-06-24 DIAGNOSIS — R91.8 LUNG INFILTRATE ON CT: ICD-10-CM

## 2024-06-24 LAB
BASOPHILS NFR FLD MANUAL: 0 %
BLASTS NFR FLD MANUAL: 0 %
CD3+CD4+ CELLS NFR BLD: 59 %
CD3+CD4+ CELLS/CD3+CD8+ CLL BLD: 3.69 %
CD3+CD8+ CELLS NFR BLD: 16 %
CLARITY FLD: ABNORMAL
COLOR FLD: ABNORMAL
EOSINOPHIL NFR FLD MANUAL: 0 %
IMMATURE GRANULOCYTES IN FLUID: 0 %
LYMPHOCYTES NFR FLD MANUAL: 8 %
MONOS+MACROS NFR FLD MANUAL: 1 %
NEUTROPHILS NFR FLD MANUAL: 91 %
OTHER CELLS NFR FLD MANUAL: 0 %
PLASMA CELLS NFR FLD MANUAL: 0 %
RBC # FLD AUTO: ABNORMAL /UL
TOTAL CELLS COUNTED FLD: 100
WBC # FLD AUTO: 5953 /UL

## 2024-06-24 PROCEDURE — 3700000001 HC GENERAL ANESTHESIA TIME - INITIAL BASE CHARGE

## 2024-06-24 PROCEDURE — 88184 FLOWCYTOMETRY/ TC 1 MARKER: CPT | Mod: TC,AHULAB | Performed by: INTERNAL MEDICINE

## 2024-06-24 PROCEDURE — 31624 DX BRONCHOSCOPE/LAVAGE: CPT | Performed by: INTERNAL MEDICINE

## 2024-06-24 PROCEDURE — 2500000004 HC RX 250 GENERAL PHARMACY W/ HCPCS (ALT 636 FOR OP/ED): Performed by: ANESTHESIOLOGIST ASSISTANT

## 2024-06-24 PROCEDURE — 87801 DETECT AGNT MULT DNA AMPLI: CPT | Performed by: INTERNAL MEDICINE

## 2024-06-24 PROCEDURE — 87070 CULTURE OTHR SPECIMN AEROBIC: CPT | Mod: AHULAB | Performed by: INTERNAL MEDICINE

## 2024-06-24 PROCEDURE — 88185 FLOWCYTOMETRY/TC ADD-ON: CPT | Mod: TC,AHULAB | Performed by: INTERNAL MEDICINE

## 2024-06-24 PROCEDURE — 87015 SPECIMEN INFECT AGNT CONCNTJ: CPT | Mod: AHULAB | Performed by: INTERNAL MEDICINE

## 2024-06-24 PROCEDURE — 87799 DETECT AGENT NOS DNA QUANT: CPT | Performed by: INTERNAL MEDICINE

## 2024-06-24 PROCEDURE — 87449 NOS EACH ORGANISM AG IA: CPT | Performed by: INTERNAL MEDICINE

## 2024-06-24 PROCEDURE — 2500000005 HC RX 250 GENERAL PHARMACY W/O HCPCS: Performed by: ANESTHESIOLOGIST ASSISTANT

## 2024-06-24 PROCEDURE — 87533 HHV-6 DNA QUANT: CPT | Performed by: INTERNAL MEDICINE

## 2024-06-24 PROCEDURE — 87305 ASPERGILLUS AG IA: CPT | Performed by: INTERNAL MEDICINE

## 2024-06-24 PROCEDURE — 89051 BODY FLUID CELL COUNT: CPT | Mod: AHULAB | Performed by: INTERNAL MEDICINE

## 2024-06-24 PROCEDURE — 87385 HISTOPLASMA CAPSUL AG IA: CPT | Performed by: INTERNAL MEDICINE

## 2024-06-24 PROCEDURE — 2500000004 HC RX 250 GENERAL PHARMACY W/ HCPCS (ALT 636 FOR OP/ED): Performed by: ANESTHESIOLOGY

## 2024-06-24 PROCEDURE — 87102 FUNGUS ISOLATION CULTURE: CPT | Mod: AHULAB | Performed by: INTERNAL MEDICINE

## 2024-06-24 PROCEDURE — 7100000001 HC RECOVERY ROOM TIME - INITIAL BASE CHARGE

## 2024-06-24 PROCEDURE — 87486 CHLMYD PNEUM DNA AMP PROBE: CPT | Performed by: INTERNAL MEDICINE

## 2024-06-24 PROCEDURE — 7100000009 HC PHASE TWO TIME - INITIAL BASE CHARGE

## 2024-06-24 PROCEDURE — 7100000010 HC PHASE TWO TIME - EACH INCREMENTAL 1 MINUTE

## 2024-06-24 PROCEDURE — 7100000002 HC RECOVERY ROOM TIME - EACH INCREMENTAL 1 MINUTE

## 2024-06-24 PROCEDURE — 3700000002 HC GENERAL ANESTHESIA TIME - EACH INCREMENTAL 1 MINUTE

## 2024-06-24 PROCEDURE — 2500000005 HC RX 250 GENERAL PHARMACY W/O HCPCS: Performed by: ANESTHESIOLOGY

## 2024-06-24 PROCEDURE — 87632 RESP VIRUS 6-11 TARGETS: CPT | Performed by: INTERNAL MEDICINE

## 2024-06-24 PROCEDURE — 88112 CYTOPATH CELL ENHANCE TECH: CPT | Mod: TC | Performed by: INTERNAL MEDICINE

## 2024-06-24 RX ORDER — PROPOFOL 10 MG/ML
INJECTION, EMULSION INTRAVENOUS CONTINUOUS PRN
Status: DISCONTINUED | OUTPATIENT
Start: 2024-06-24 | End: 2024-06-24

## 2024-06-24 RX ORDER — FENTANYL CITRATE 50 UG/ML
INJECTION, SOLUTION INTRAMUSCULAR; INTRAVENOUS AS NEEDED
Status: DISCONTINUED | OUTPATIENT
Start: 2024-06-24 | End: 2024-06-24

## 2024-06-24 RX ORDER — ROCURONIUM BROMIDE 10 MG/ML
INJECTION, SOLUTION INTRAVENOUS AS NEEDED
Status: DISCONTINUED | OUTPATIENT
Start: 2024-06-24 | End: 2024-06-24

## 2024-06-24 RX ORDER — SODIUM CHLORIDE, SODIUM LACTATE, POTASSIUM CHLORIDE, CALCIUM CHLORIDE 600; 310; 30; 20 MG/100ML; MG/100ML; MG/100ML; MG/100ML
100 INJECTION, SOLUTION INTRAVENOUS CONTINUOUS
Status: DISCONTINUED | OUTPATIENT
Start: 2024-06-24 | End: 2024-06-25 | Stop reason: HOSPADM

## 2024-06-24 RX ORDER — HYDRALAZINE HYDROCHLORIDE 20 MG/ML
5 INJECTION INTRAMUSCULAR; INTRAVENOUS EVERY 30 MIN PRN
Status: DISCONTINUED | OUTPATIENT
Start: 2024-06-24 | End: 2024-06-24 | Stop reason: HOSPADM

## 2024-06-24 RX ORDER — ONDANSETRON HYDROCHLORIDE 2 MG/ML
INJECTION, SOLUTION INTRAVENOUS AS NEEDED
Status: DISCONTINUED | OUTPATIENT
Start: 2024-06-24 | End: 2024-06-24

## 2024-06-24 RX ORDER — SODIUM CHLORIDE, SODIUM LACTATE, POTASSIUM CHLORIDE, CALCIUM CHLORIDE 600; 310; 30; 20 MG/100ML; MG/100ML; MG/100ML; MG/100ML
100 INJECTION, SOLUTION INTRAVENOUS CONTINUOUS
Status: DISCONTINUED | OUTPATIENT
Start: 2024-06-24 | End: 2024-06-24 | Stop reason: HOSPADM

## 2024-06-24 RX ORDER — LIDOCAINE HYDROCHLORIDE 20 MG/ML
INJECTION, SOLUTION EPIDURAL; INFILTRATION; INTRACAUDAL; PERINEURAL AS NEEDED
Status: DISCONTINUED | OUTPATIENT
Start: 2024-06-24 | End: 2024-06-24

## 2024-06-24 RX ORDER — ONDANSETRON HYDROCHLORIDE 2 MG/ML
4 INJECTION, SOLUTION INTRAVENOUS ONCE AS NEEDED
Status: DISCONTINUED | OUTPATIENT
Start: 2024-06-24 | End: 2024-06-24 | Stop reason: HOSPADM

## 2024-06-24 RX ORDER — OXYCODONE HYDROCHLORIDE 5 MG/1
5 TABLET ORAL EVERY 4 HOURS PRN
Status: DISCONTINUED | OUTPATIENT
Start: 2024-06-24 | End: 2024-06-24 | Stop reason: HOSPADM

## 2024-06-24 RX ORDER — MIDAZOLAM HYDROCHLORIDE 1 MG/ML
INJECTION INTRAMUSCULAR; INTRAVENOUS AS NEEDED
Status: DISCONTINUED | OUTPATIENT
Start: 2024-06-24 | End: 2024-06-24

## 2024-06-24 SDOH — HEALTH STABILITY: MENTAL HEALTH: CURRENT SMOKER: 1

## 2024-06-24 ASSESSMENT — COLUMBIA-SUICIDE SEVERITY RATING SCALE - C-SSRS
6. HAVE YOU EVER DONE ANYTHING, STARTED TO DO ANYTHING, OR PREPARED TO DO ANYTHING TO END YOUR LIFE?: NO
2. HAVE YOU ACTUALLY HAD ANY THOUGHTS OF KILLING YOURSELF?: NO
1. IN THE PAST MONTH, HAVE YOU WISHED YOU WERE DEAD OR WISHED YOU COULD GO TO SLEEP AND NOT WAKE UP?: NO

## 2024-06-24 ASSESSMENT — PAIN SCALES - GENERAL
PAINLEVEL_OUTOF10: 0 - NO PAIN

## 2024-06-24 NOTE — INTERVAL H&P NOTE
H&P reviewed. The patient was examined and there are no changes to the H&P.  On exam, NAD, CTAB, RRR no m/r/g, +BS NTND, no edema.  Proceed with bronchoscopy.

## 2024-06-24 NOTE — ANESTHESIA PROCEDURE NOTES
Airway  Date/Time: 6/24/2024 7:55 AM  Urgency: elective    Airway not difficult    Staffing  Performed: EARL   Authorized by: Steff Alves MD    Performed by: EARL Rodriguez  Patient location during procedure: OR    Indications and Patient Condition  Indications for airway management: anesthesia  Spontaneous Ventilation: absent  Sedation level: deep  Preoxygenated: yes  Patient position: sniffing  Mask difficulty assessment: 1 - vent by mask    Final Airway Details  Final airway type: endotracheal airway      Successful airway: ETT     Successful intubation technique: direct laryngoscopy  Facilitating devices/methods: intubating stylet  Endotracheal tube insertion site: oral  Blade: Porsha  Blade size: #3  ETT size (mm): 7.5  Cormack-Lehane Classification: grade I - full view of glottis  Placement verified by: chest auscultation and capnometry   Measured from: lips  ETT to lips (cm): 21  Number of attempts at approach: 1  Number of other approaches attempted: 0

## 2024-06-24 NOTE — SIGNIFICANT EVENT
Pt received from procedure.  HOB elevated.  Pt placed on continuous cardiac / saO2 monitor.   Moist cough noted.  Suctioned orally.

## 2024-06-24 NOTE — LETTER
Dear, bAena Shaw      6/11/2024                                              INFORMATION FOR YOUR PROCEDURE     INSTRUCTIONS MUST BE FOLLOWED OR YOU RUN THE RISK OF YOUR CASE BEING CANCELED     Information is attached to this e-mail for your upcoming procedure. (Look for the paperclip in your email to access this information)  Lab requisitions are also included as well as procedural instructions.    You are scheduled for your Bronchoscopy on   6/24/24,   with Dr. Aurelia Marshall    Arrival is at  6:00  AM,  for Check In prior to your actual procedure time. This allows us to prepare you for your procedure.  Location:   [place] [address]    Check In:  2nd Floor OR Waiting Room  Approach the  for check in    Patient information is right there if assistance is needed.    NPO (No food or drink) after midnight the night before your procedure. This includes coffee, water, and soda, hard candy, gum or mints.  If taking your morning medications, a small sip of water is allowed to get the medication down.    For your safety, you must have a responsible, adult  accompany you to your procedure.  You will not be permitted to drive yourself home if you have received any type of anesthesia or sedation.    Automated calls about your upcoming scheduled appointments can be quite confusing for patients.    The times may vary depending on what you have scheduled for procedure day. Follow the time/s I have given you  so there is no confusion.      Blood work will need to be done prior to your procedure, preferably at a  Facility.  There are no restrictions for testing. I have attached a requisition for you.    Our Nurse Practitioner, hWitney Alexis CNP, will call you on 6/21/24, @ 8:00 AM    This is a phone visit to go over your medical history prior to your procedure, and is a necessary part of your workup.  The patient must be  present at this phone visit.    Please reach out to me with any questions you may have Stephanie @ [phone] or   [facility] @ [phone]    Have a nice day!    Stephanie Ren    Bronchoscopy   Interventional Pulmonology    MD David Price MD Sameer Avasarala, MD Catalina Teba, MD Andrew Dunatchik, MD        [facility]  Pulmonary, Critical Care and Sleep Medicine  [address] [place] [address]  P -[phone]   [phone]  [email or web address]

## 2024-06-24 NOTE — ANESTHESIA POSTPROCEDURE EVALUATION
Patient: Abena Shaw    Procedure Summary       Date: 06/24/24 Room / Location: [facility]    Anesthesia Start: 0739 Anesthesia Stop: 0828    Procedure: BRONCHOSCOPY Diagnosis: Lung infiltrate on CT    Scheduled Providers: Aurelia BYRD MD; Steff Alves MD; EARL Rodriguez; Sherry Bhakta RN Responsible Provider: Steff Alves MD    Anesthesia Type: general ASA Status: 3            Anesthesia Type: general    Vitals Value Taken Time   /60 06/24/24 0916   Temp 36.4 °C (97.5 °F) 06/24/24 0822   Pulse 61 06/24/24 0919   Resp 17 06/24/24 0900   SpO2 97 % 06/24/24 0919   Vitals shown include unfiled device data.    Anesthesia Post Evaluation    Patient location during evaluation: PACU  Patient participation: complete - patient participated  Level of consciousness: awake  Pain management: satisfactory to patient  Multimodal analgesia pain management approach  Airway patency: patent  Cardiovascular status: hemodynamically stable  Respiratory status: spontaneous ventilation  Hydration status: acceptable  Postoperative Nausea and Vomiting: none        No notable events documented.

## 2024-06-25 LAB — PATH REVIEW-CELL CT,FLUID: NORMAL

## 2024-06-25 ASSESSMENT — PAIN SCALES - GENERAL: PAINLEVEL_OUTOF10: 0 - NO PAIN

## 2024-06-26 LAB
ACID FAST STN SPEC: NORMAL
ADENOVIRUS QPCR, VIRC: NOT DETECTED COPIES/ML
ADENOVIRUS RVP, VIRC: NOT DETECTED
ASPERGILLUS GALACTOMANNAN EIA (NON-BLOOD SPECIMEN): 0.07
CHLAMYDIA.PNEUMONIAE PCR, VIRC: NOT DETECTED
CYTOMEGALOVIRUS DNA PCR, (NON-BLOOD SPECI: NOT DETECTED IU/ML
ENTEROVIRUS/RHINOVIRUS RVP, VIRC: NOT DETECTED
FUNGITELL BETA-D GLUCAN PCR, QUANTITATIVE (NON-BLOOD SPECIMEN): <45 PG/ML
HUMAN BOCAVIRUS RVP, VIRC: NOT DETECTED
HUMAN CORONAVIRUS RVP, VIRC: NOT DETECTED
HUMAN HERPESVIRUS-6 DNA PCR, QUANTITATIVE (NON-BLOOD SPECIMEN): NOT DETECTED COPIES/ML
INFLUENZA A , VIRC: NOT DETECTED
INFLUENZA A H1N1-09 , VIRC: NOT DETECTED
INFLUENZA B PCR, VIRC: NOT DETECTED
LABORATORY COMMENT REPORT: NORMAL
LABORATORY COMMENT REPORT: NORMAL
LEGIONELLA PNEUMO PCR, VIRC: NOT DETECTED
METAPNEUMOVIRUS , VIRC: NOT DETECTED
MYCOBACTERIUM SPEC CULT: NORMAL
MYCOPLASMA.PNEUMONIAE PCR, VIRC: NOT DETECTED
PAN.LEGIONELLA PCR, VIRC: NOT DETECTED
PARAINFLUENZA PCR, VIRC: NOT DETECTED
PATH REPORT.FINAL DX SPEC: NORMAL
PATH REPORT.GROSS SPEC: NORMAL
PATH REPORT.RELEVANT HX SPEC: NORMAL
PATH REPORT.TOTAL CANCER: NORMAL
PNEUMOCYSTIS PCR,QUANTITATIVE (NON-BLOOD SPECIMEN): NOT DETECTED COPIES/ML
RSV PCR, RVP, VIRC: NOT DETECTED

## 2024-06-27 LAB
BACTERIA SPEC RESP CULT: ABNORMAL
GRAM STN SPEC: ABNORMAL
GRAM STN SPEC: ABNORMAL
H CAPSUL AG SPEC QL: NOT DETECTED
SCAN RESULT: NORMAL

## 2024-06-28 DIAGNOSIS — J18.9 LUNG INFECTION: ICD-10-CM

## 2024-06-28 LAB
FUNGUS SPEC CULT: NORMAL
FUNGUS SPEC FUNGUS STN: NORMAL

## 2024-06-28 RX ORDER — AZITHROMYCIN 250 MG/1
TABLET, FILM COATED ORAL DAILY
Qty: 6 TABLET | Refills: 0 | Status: SHIPPED | OUTPATIENT
Start: 2024-06-28 | End: 2024-07-03

## 2024-07-01 LAB
FUNGUS SPEC CULT: NORMAL
FUNGUS SPEC FUNGUS STN: NORMAL

## 2024-07-03 LAB
ACID FAST STN SPEC: NORMAL
MYCOBACTERIUM SPEC CULT: NORMAL

## 2024-07-08 LAB
FUNGUS SPEC CULT: NORMAL
FUNGUS SPEC FUNGUS STN: NORMAL

## 2024-07-10 LAB
ACID FAST STN SPEC: NORMAL
MYCOBACTERIUM SPEC CULT: NORMAL

## 2024-07-15 LAB
FUNGUS SPEC CULT: NORMAL
FUNGUS SPEC FUNGUS STN: NORMAL

## 2024-07-17 LAB
ACID FAST STN SPEC: NORMAL
MYCOBACTERIUM SPEC CULT: NORMAL

## 2024-07-22 ENCOUNTER — APPOINTMENT (OUTPATIENT)
Dept: PULMONOLOGY | Facility: HOSPITAL | Age: 74
End: 2024-07-22
Payer: MEDICARE

## 2024-07-24 LAB
ACID FAST STN SPEC: NORMAL
MYCOBACTERIUM SPEC CULT: NORMAL

## 2024-07-31 LAB
ACID FAST STN SPEC: NORMAL
MYCOBACTERIUM SPEC CULT: NORMAL

## 2024-08-07 LAB
ACID FAST STN SPEC: NORMAL
MYCOBACTERIUM SPEC CULT: NORMAL

## 2024-08-14 ENCOUNTER — HOSPITAL ENCOUNTER (OUTPATIENT)
Dept: RADIOLOGY | Facility: CLINIC | Age: 74
Discharge: HOME | End: 2024-08-14
Payer: MEDICARE

## 2024-08-14 VITALS — HEIGHT: 61 IN | WEIGHT: 89.95 LBS | BODY MASS INDEX: 16.98 KG/M2

## 2024-08-14 DIAGNOSIS — Z12.31 VISIT FOR SCREENING MAMMOGRAM: ICD-10-CM

## 2024-08-14 LAB
ACID FAST STN SPEC: NORMAL
MYCOBACTERIUM SPEC CULT: NORMAL

## 2024-08-14 PROCEDURE — 77067 SCR MAMMO BI INCL CAD: CPT

## 2024-08-14 PROCEDURE — 77063 BREAST TOMOSYNTHESIS BI: CPT | Performed by: RADIOLOGY

## 2024-08-14 PROCEDURE — 77067 SCR MAMMO BI INCL CAD: CPT | Performed by: RADIOLOGY

## 2024-08-19 ENCOUNTER — APPOINTMENT (OUTPATIENT)
Dept: PULMONOLOGY | Facility: CLINIC | Age: 74
End: 2024-08-19
Payer: MEDICARE

## 2024-08-26 DIAGNOSIS — R92.2 INCONCLUSIVE MAMMOGRAM: ICD-10-CM

## 2024-08-26 DIAGNOSIS — T85.49XD DEFLATION OF BREAST IMPLANT, SUBSEQUENT ENCOUNTER: Primary | ICD-10-CM

## 2024-09-10 ENCOUNTER — HOSPITAL ENCOUNTER (OUTPATIENT)
Dept: RADIOLOGY | Facility: HOSPITAL | Age: 74
Discharge: HOME | End: 2024-09-10
Payer: MEDICARE

## 2024-09-10 DIAGNOSIS — T85.49XD DEFLATION OF BREAST IMPLANT, SUBSEQUENT ENCOUNTER: ICD-10-CM

## 2024-09-10 DIAGNOSIS — R92.2 INCONCLUSIVE MAMMOGRAM: ICD-10-CM

## 2024-09-10 PROCEDURE — 77047 MRI BREAST C- BILATERAL: CPT | Mod: 50

## 2024-09-10 PROCEDURE — 77047 MRI BREAST C- BILATERAL: CPT | Mod: BILATERAL PROCEDURE | Performed by: STUDENT IN AN ORGANIZED HEALTH CARE EDUCATION/TRAINING PROGRAM

## 2024-09-16 ENCOUNTER — APPOINTMENT (OUTPATIENT)
Dept: PULMONOLOGY | Facility: HOSPITAL | Age: 74
End: 2024-09-16
Payer: MEDICARE

## 2024-09-18 ENCOUNTER — TELEPHONE (OUTPATIENT)
Dept: PRIMARY CARE | Facility: CLINIC | Age: 74
End: 2024-09-18
Payer: MEDICARE

## 2024-09-18 NOTE — TELEPHONE ENCOUNTER
Patient  called. He said his wife wants you to give him a call instead of calling lizabeth because she wants you to speak with him about it instead of her.

## 2024-09-19 DIAGNOSIS — T85.43XA RUPTURED SILICONE BREAST IMPLANT, INITIAL ENCOUNTER: Primary | ICD-10-CM

## 2024-10-01 ENCOUNTER — OFFICE VISIT (OUTPATIENT)
Dept: PULMONOLOGY | Facility: CLINIC | Age: 74
End: 2024-10-01
Payer: MEDICARE

## 2024-10-01 VITALS
TEMPERATURE: 97.2 F | SYSTOLIC BLOOD PRESSURE: 147 MMHG | DIASTOLIC BLOOD PRESSURE: 62 MMHG | WEIGHT: 92 LBS | HEART RATE: 56 BPM | HEIGHT: 62 IN | BODY MASS INDEX: 16.93 KG/M2 | OXYGEN SATURATION: 95 %

## 2024-10-01 DIAGNOSIS — Z87.891 FORMER SMOKER: ICD-10-CM

## 2024-10-01 DIAGNOSIS — A31.0: ICD-10-CM

## 2024-10-01 DIAGNOSIS — R63.4 WEIGHT LOSS: Primary | ICD-10-CM

## 2024-10-01 DIAGNOSIS — F17.210 NICOTINE DEPENDENCE, CIGARETTES, UNCOMPLICATED: ICD-10-CM

## 2024-10-01 PROCEDURE — 1036F TOBACCO NON-USER: CPT | Performed by: INTERNAL MEDICINE

## 2024-10-01 PROCEDURE — 1159F MED LIST DOCD IN RCRD: CPT | Performed by: INTERNAL MEDICINE

## 2024-10-01 PROCEDURE — 1123F ACP DISCUSS/DSCN MKR DOCD: CPT | Performed by: INTERNAL MEDICINE

## 2024-10-01 PROCEDURE — 99213 OFFICE O/P EST LOW 20 MIN: CPT | Performed by: INTERNAL MEDICINE

## 2024-10-01 PROCEDURE — 1126F AMNT PAIN NOTED NONE PRSNT: CPT | Performed by: INTERNAL MEDICINE

## 2024-10-01 ASSESSMENT — PAIN SCALES - GENERAL: PAINLEVEL: 0-NO PAIN

## 2024-10-01 NOTE — ASSESSMENT & PLAN NOTE
Last LCS CT 5/20/2024---> due for 6 month follow up- ordered    Need to make sure changes from prior improve

## 2024-10-01 NOTE — ASSESSMENT & PLAN NOTE
Possibly related to diet changes and dental issues- please monitor weights regularly and keep a log.  Try to eat 3 meals a day and supplement your diet with boost or ensure supplements.   Offered nutritional consultation- but declined, let me know if interested.      Follow up with dentist to solve your dentition issues.

## 2024-10-01 NOTE — PROGRESS NOTES
Department of Medicine  Division of Pulmonary, Critical Care, and Sleep Medicine  Follow-Up Visit  [address] [facility]    Background:  follow up from bronchoscopy 6/24/2024    HPI (DATE):    PCP- Dr. Sallie Mendosa    CC: lung infiltrates    73 yo F past 45 pack year smoker, quit 5 years ago, here in follow up from prior abnormal CT chest found on LCS through PCP, s/p bronch with culture results for MSSA---> treated with a Z pack.  Patient presents today accompanied by - has been feeling much better since lung infection treated- no more cough.     Initial evaluation was also related to weight loss which was noted after diet change and nutrition change after having dental issues- still with dentist follow up and has a bridge currently.     Bronc BAL RML 6/24/24- culture with MSSA 1+ rare  Negative- afb, fungal, histo, aspergillus, viral PCR- negative  Cytology- acute inflammation noted, no fungal/pneumocystis, no malignant cells     ROS: used to weigh 120 lbs---> then started losing weight due to dental issues and diet change- new weight is around 95 lbs and has been there since ~ Jan 2024.  No fevers, chills, nausea, emesis, diarrhea, chest pain, occasional dry cough, no edema, night sweats, rare dysphagia when takes a pill- encouraged her to discuss with pill prescriber or pharmacy.  Other ros reviewed and negative for complaints.    Has been getting UTD with cancer screenings- had abnormal mammogram with implant issues- has appt with plastics in December- trying to move appt up.       Past smoker quit 5 years ago- smoked from [age] years of age 1ppd ~ 45 pack year smoker.  No etoh or drugs.     No other changes in PMHx.       Immunization History:  Immunization History   Administered Date(s) Administered    Flu vaccine (IIV4), preservative free *Check age/dose* 10/25/2021    Influenza, seasonal, injectable 10/25/2021    Pfizer Purple Cap SARS-CoV-2 03/25/2021, 04/15/2021    Tdap vaccine,  "age 7 year and older (BOOSTRIX, ADACEL) 01/24/2022       Current Medications:  Current Outpatient Medications   Medication Instructions    aspirin 81 mg, oral, Daily    CALCIUM ORAL Calcium TABS   Refills: 0       Active    cholecalciferol (Vitamin D-3) 50 mcg (2,000 unit) capsule oral, Daily RT    cyanocobalamin, vitamin B-12, 2,500 mcg tablet, sublingual sublingual, Daily RT    metoprolol succinate XL (TOPROL-XL) 50 mg, oral, Daily    pyridoxine (VITAMIN B-6) 100 mg, oral, Daily        Drug Allergies/Intolerances:  No Known Allergies     Physical Examination:  /62   Pulse 56   Temp 36.2 °C (97.2 °F)   Ht 1.575 m (5' 2\")   Wt (!) 41.7 kg (92 lb)   SpO2 95%   BMI 16.83 kg/m²      General: ambulated independently; no acute distress; thin  HEENT: normocephalic; anicteric sclerae; conjunctivae not injected; nasal mucosa was unremarkable; oropharynx was clear without evidence of thrush; dentition shows decay, no obvious abscesses  Neck: supple; no lymphadenopathy or thyromegaly.  Chest: clear to auscultation bilaterally; no chest wall deformity.  Cardiac: regular rhythm; no gallop or murmur.  Extremities: no leg edema; no digital clubbing;   Psychiatric: did not appear depressed or anxious.    Pulmonary Function Test Results       N/A    Chest Radiograph     CHEST 2 VIEW 07/02/2021    Narrative  MRN: [id number]  Patient Name: TUNDE ALEX    STUDY:  TH CHEST 2 VIEW PA AND LAT;    INDICATION:  cough.    COMPARISON:  08/04/2015    ACCESSION NUMBER(S):  [id number]    ORDERING CLINICIAN:  SHAHEEN PATTERSON    FINDINGS:  The cardiac silhouette size is within normal limits.  There is no focal consolidation, edema or pneumothorax.  No sizeable pleural effusion.  No acute osseous abnormality.  Silicone implants noted bilaterally with capsular calcifications.    Impression  No acute cardiopulmonary process.      Echocardiogram     [facility], [address] [place] [address]               Tel " [phone] and Fax [phone]     TRANSTHORACIC ECHOCARDIOGRAM REPORT        Patient Name:     TUNDE CLIFTON         Reading Physician:   [id number] Koko ALEX DO  Study Date:       7/28/2021           Referring Physician: SHAHEEN PATTERSON  MRN/PID:          [id number]            PCP:  Accession/Order#: [id number]        Department Location:  Non                                                             Invasive  YOB: 1950           Fellow:  Gender:           F                   Nurse:  Admit Date:                           Sonographer:         [facility]  Admission Status: Outpatient          Additional Staff:  Height:           157.48 cm           CC Report to:  Weight:           53.52 kg            Study Type:          Echocardiogram  BSA:              1.53 m2  Blood Pressure: 116 /73 mmHg     Diagnosis/ICD: R94.31-Abnormal electrocardiogram [ECG] [EKG]  Indication:    Epigastric pain; Renea-Parkinson-White syndrome  Procedure/CPT: Echo Complete w Full [id number]     Patient History:  Pertinent History: WPW; chest discomfort; palpitations.     Study Detail: The following Echo studies were performed: 2D, M-Mode, Doppler and                color flow. Image quality for this study is good. Technically                challenging study due to body habitus.        PHYSICIAN INTERPRETATION:  Left Ventricle: The left ventricular systolic function is normal, with an estimated ejection fraction of 65-70%. The calculated ejection fraction is normal at 69 % using the Marshall's Bi-plane MOD calculation. Estimated left ventricular mass is normal. There are no regional wall motion abnormalities. The left ventricular cavity size is normal. The left ventricular septal wall thickness is normal. There is normal left ventricular posterior wall thickness. Spectral Doppler shows a normal pattern of left ventricular diastolic filling.  There are normal left ventricular filling pressures.  Left Atrium: The left atrium is normal in size.  Right Ventricle: The right ventricle is normal in size. There is normal right ventricular global systolic function. TAPSE = 20 mm.  Right Atrium: The right atrium is normal in size. The right atrium has a prominent Chiari network.  Aortic Valve: The aortic valve appears structurally normal. The aortic valve appears tricuspid. There is mild aortic valve cusp calcification. There is no evidence of aortic valve regurgitation. The peak instantaneous gradient of the aortic valve is 6.9 mmHg.  Mitral Valve: The mitral valve is mildly thickened. There is trace to mild mitral valve regurgitation.  Tricuspid Valve: The tricuspid valve is structurally normal. There is trace to mild tricuspid regurgitation. The Doppler estimated RVSP is within normal limits at 27.1 mmHg.  Pulmonic Valve: The pulmonic valve is structurally normal. There is trace to mild pulmonic valve regurgitation.  Pericardium: There is no pericardial effusion noted.  Aorta: The aortic root is normal. There is no dilatation of the ascending aorta. There is no dilatation of the aortic root.  Systemic Veins: The inferior vena cava appears to be of normal size.  In comparison to the previous echocardiogram(s): Although previous studies are available for review, their comparison with the current echocardiogram is clinically irrelevant.        CONCLUSIONS:   1. The left ventricular systolic function is normal with a 65-70% estimated ejection fraction.   2. RVSP within normal limits.       Chest CT Scan     CT lung screening low dose 05/30/2024    Narrative  Interpreted By:  Lazaro Contreras,  STUDY:  CT LUNG SCREENING LOW DOSE; 5/30/2024 8:25 am    INDICATION:  Smoker screening    COMPARISON:  No prior    ACCESSION NUMBER(S):  [id number]    ORDERING CLINICIAN:  SHAHEEN PATTERSON    TECHNIQUE:  Helical data acquisition of the chest was obtained without IV  contrast  material.  Images were reformatted in axial, coronal, and  sagittal planes.    FINDINGS:  LUNGS AND AIRWAYS:  Moderately severe emphysema with upper lung zone predilection and  generalized airway thickening. Postinflammatory scarring at the  apices. This bronchiectasis with airway impaction and mild  bronchiolitis which is essentially localized in the right middle lobe  and lingula. No definite worrisome pulmonary nodule. A small benign  coarsely calcified granuloma is present at the right base. No dense  consolidation or cavitation. No central airway obstruction.    MEDIASTINUM AND SHAKIR, LOWER NECK AND AXILLA:  No intrathoracic lymphadenopathy. The esophagus is nondilated. No  masses are identified in the lower neck    HEART AND VESSELS:  Normal heart size. No pericardial effusion. Normal caliber thoracic  aorta and pulmonary trunk. Scattered atherosclerotic calcifications    UPPER ABDOMEN:  No acute upper abdominal finding is identified    CHEST WALL AND OSSEOUS STRUCTURES:  The patient is status post prior bilateral mastectomies with implant  reconstruction. Both implants have peripheral coarse calcification.  Mild superior endplate compression deformity of the T8 vertebral  body, sagittal series 205, image 56    Impression  1. Underlying emphysema and generalized airway thickening. Airways  disease which is essentially localized to the right middle lobe and  lingula, compatible with non tuberculous mycobacterial infection. If  this is not a known diagnosis, advise correlation with sputum  analysis. Low-dose CT follow-up advised in 6 months to reassess.  2. Estimated coronary artery calcium score is 194*.  3. Bilateral mastectomies with implant reconstruction. Both implants  have peripheral coarse calcification.      LUNG RADS CATEGORY:  Lung Rad: Lung-RADS 3 (Probably Benign)    Recommendation: Follow up Low Dose Chest CT in 6 months, recommended  as per American College of Radiology Guidelines Lung-RADS  Version  2022. Recommend F/U with Pulmonologist.    A yellow alert in epic was placed to ensure clinician receipt of this  abnormal result and recommendation for low-dose CT follow-up in 6  months by Dr. Contreras at 7:13 p.m. on 05/30/2024      MACRO:  None    Signed by: Lazaro Contreras 5/30/2024 7:14 PM  Dictation workstation:   [id number]       Laboratory Studies    Metabolic Parameters     Sodium   Date/Time Value Ref Range Status   06/14/2024 01:09  136 - 145 mmol/L Final     Potassium   Date/Time Value Ref Range Status   06/14/2024 01:09 PM 4.8 3.5 - 5.3 mmol/L Final     Chloride   Date/Time Value Ref Range Status   06/14/2024 01:09 PM 99 98 - 107 mmol/L Final     Bicarbonate   Date/Time Value Ref Range Status   06/14/2024 01:09 PM 29 21 - 32 mmol/L Final     Anion Gap   Date/Time Value Ref Range Status   06/14/2024 01:09 PM 15 10 - 20 mmol/L Final     Urea Nitrogen   Date/Time Value Ref Range Status   06/14/2024 01:09 PM 14 6 - 23 mg/dL Final     Creatinine   Date/Time Value Ref Range Status   06/14/2024 01:09 PM 0.87 0.50 - 1.05 mg/dL Final     GFR Female   Date/Time Value Ref Range Status   08/07/2023 05:45 PM 76 >90 mL/min/1.73m2 Final     Comment:      CALCULATIONS OF ESTIMATED GFR ARE PERFORMED   USING THE 2021 CKD-EPI STUDY REFIT EQUATION   WITHOUT THE RACE VARIABLE FOR THE IDMS-TRACEABLE   CREATININE METHODS.    https://jasn.asnjournals.org/content/early/2021/09/22/ASN.0475488781     Glucose   Date/Time Value Ref Range Status   06/14/2024 01:09 PM 81 74 - 99 mg/dL Final     Calcium   Date/Time Value Ref Range Status   06/14/2024 01:09 PM 10.3 8.6 - 10.6 mg/dL Final       Hematologic Parameters     WBC   Date/Time Value Ref Range Status   06/14/2024 01:09 PM 9.2 4.4 - 11.3 x10*3/uL Final     Neutrophils Absolute   Date/Time Value Ref Range Status   07/02/2021 12:03 PM 5.09 1.60 - 5.50 x10E9/L Final     Neutrophils %   Date/Time Value Ref Range Status   07/02/2021 12:03 PM 67.8 40.0 - 80.0 % Final      Lymphocytes %   Date/Time Value Ref Range Status   07/02/2021 12:03 PM 24.5 13.0 - 44.0 % Final     Monocytes %   Date/Time Value Ref Range Status   07/02/2021 12:03 PM 6.9 2.0 - 10.0 % Final     Basophils %   Date/Time Value Ref Range Status   07/02/2021 12:03 PM 0.5 0.0 - 2.0 % Final     Eosinophils Absolute   Date/Time Value Ref Range Status   07/02/2021 12:03 PM 0.00 0.00 - 0.40 x10E9/L Final     Eosinophils %   Date/Time Value Ref Range Status   07/02/2021 12:03 PM 0.0 0.0 - 6.0 % Final     Hemoglobin   Date/Time Value Ref Range Status   06/14/2024 01:09 PM 13.6 12.0 - 16.0 g/dL Final     Hematocrit   Date/Time Value Ref Range Status   06/14/2024 01:09 PM 42.4 36.0 - 46.0 % Final     RBC   Date/Time Value Ref Range Status   06/14/2024 01:09 PM 4.48 4.00 - 5.20 x10*6/uL Final     MCV   Date/Time Value Ref Range Status   06/14/2024 01:09 PM 95 80 - 100 fL Final     MCHC   Date/Time Value Ref Range Status   06/14/2024 01:09 PM 32.1 32.0 - 36.0 g/dL Final     Platelets   Date/Time Value Ref Range Status   06/14/2024 01:09  150 - 450 x10*3/uL Final       Fat-Soluble Vitamin Levels     Vitamin D, 25-Hydroxy, Total   Date/Time Value Ref Range Status   05/13/2024 09:16 AM 66 30 - 100 ng/mL Final       LFT and Associated Parameters     AST   Date/Time Value Ref Range Status   05/13/2024 09:16 AM 15 9 - 39 U/L Final     ALT   Date/Time Value Ref Range Status   05/13/2024 09:16 AM 9 7 - 45 U/L Final     Comment:     Patients treated with Sulfasalazine may generate falsely decreased results for ALT.     Alkaline Phosphatase   Date/Time Value Ref Range Status   05/13/2024 09:16 AM 43 33 - 136 U/L Final     Bilirubin, Total   Date/Time Value Ref Range Status   05/13/2024 09:16 AM 0.5 0.0 - 1.2 mg/dL Final     Albumin   Date/Time Value Ref Range Status   05/13/2024 09:16 AM 3.9 3.4 - 5.0 g/dL Final     Total Protein   Date/Time Value Ref Range Status   05/13/2024 09:16 AM 7.2 6.4 - 8.2 g/dL Final       Coagulation  "Parameters     No results found for: \"PROTIME\", \"INR\", \"PTT\"    Diabetes Laboratory Tests     No results found for: \"HGBA1C\", \"MICROALBCREA\", \"GLUCTF\", \"GLUCT2\"     Immunology Laboratory Tests     No results found for: \"ICIGE\", \"IGE\", \"ICA04\", \"ASPFU\", \"IGG\", \"IGA\", \"IGM\"    CF Sputum Culture     AFB Culture   Date Value Ref Range Status   06/24/2024 No Mycobacteria isolated.  Final      No results found for: \"RESPCULTCYFI\"    No results found for the last 90 days.      Assessment and Plan / Recommendations:  Problem List Items Addressed This Visit       Weight loss - Primary    Current Assessment & Plan     Possibly related to diet changes and dental issues- please monitor weights regularly and keep a log.  Try to eat 3 meals a day and supplement your diet with boost or ensure supplements.   Offered nutritional consultation- but declined, let me know if interested.      Follow up with dentist to solve your dentition issues.          Former smoker    Current Assessment & Plan     Go for PFTs to determine if any lung injury from smoking.  Congratulations on quitting!          Nicotine dependence, cigarettes, uncomplicated    Current Assessment & Plan     Last LCS CT 5/20/2024---> due for 6 month follow up- ordered    Need to make sure changes from prior improve          Other Visit Diagnoses       Non-tuberculous mycobacterial pneumonia (Multi)            Symptoms improved after treating for MSSA which was found on Bronch BAL 6/24/24 with z pack by PCP.      [facility] today 1     Follow up with other providers- vaccinations, dental issues, plastic surgery.     Keep up good work!     Office #[phone], can use  option or nurses line   [place] Artem Hernandez # [phone]     Radiology Scheduling #[phone]    RTC after CT chest and PFTs          Follow-up: Visit date not found  Future Appointments   Date Time Provider Department Center   12/3/2024  8:00 AM oCnchis Florez MD [id number] [facility]   2/17/2025  " 9:00 AM Gutierrez BYRD MD [id number] MD [place]   10/01/2024

## 2024-10-01 NOTE — PATIENT INSTRUCTIONS
Assessment and Plan / Recommendations:  Problem List Items Addressed This Visit       Weight loss - Primary    Current Assessment & Plan     Possibly related to diet changes and dental issues- please monitor weights regularly and keep a log.  Try to eat 3 meals a day and supplement your diet with boost or ensure supplements.   Offered nutritional consultation- but declined, let me know if interested.      Follow up with dentist to solve your dentition issues.          Former smoker    Current Assessment & Plan     Go for PFTs to determine if any lung injury from smoking.  Congratulations on quitting!          Nicotine dependence, cigarettes, uncomplicated    Current Assessment & Plan     Last LCS CT 5/20/2024---> due for 6 month follow up- ordered    Need to make sure changes from prior improve          Other Visit Diagnoses       Non-tuberculous mycobacterial pneumonia (Multi)              Follow up with other providers- vaccinations, dental issues, plastic surgery.     Keep up good work!     Office #[phone], can use  option or nurses line   [place] Artem Hernandez # [phone]     Radiology Scheduling #[phone]    RTC after CT chest and PFTs

## 2024-10-14 ENCOUNTER — APPOINTMENT (OUTPATIENT)
Dept: PULMONOLOGY | Facility: HOSPITAL | Age: 74
End: 2024-10-14
Payer: MEDICARE

## 2024-10-30 ENCOUNTER — HOSPITAL ENCOUNTER (OUTPATIENT)
Dept: RESPIRATORY THERAPY | Facility: HOSPITAL | Age: 74
Discharge: HOME | End: 2024-10-30
Payer: MEDICARE

## 2024-10-30 DIAGNOSIS — Z87.891 FORMER SMOKER: ICD-10-CM

## 2024-10-30 PROCEDURE — 94729 DIFFUSING CAPACITY: CPT | Performed by: INTERNAL MEDICINE

## 2024-10-30 PROCEDURE — 94726 PLETHYSMOGRAPHY LUNG VOLUMES: CPT | Performed by: INTERNAL MEDICINE

## 2024-10-30 PROCEDURE — 94726 PLETHYSMOGRAPHY LUNG VOLUMES: CPT

## 2024-10-30 PROCEDURE — 94060 EVALUATION OF WHEEZING: CPT | Performed by: INTERNAL MEDICINE

## 2024-11-21 ENCOUNTER — HOSPITAL ENCOUNTER (OUTPATIENT)
Dept: RADIOLOGY | Facility: HOSPITAL | Age: 74
Discharge: HOME | End: 2024-11-21
Payer: MEDICARE

## 2024-11-21 DIAGNOSIS — R91.8 OTHER NONSPECIFIC ABNORMAL FINDING OF LUNG FIELD: ICD-10-CM

## 2024-11-21 PROCEDURE — 76380 CAT SCAN FOLLOW-UP STUDY: CPT | Performed by: RADIOLOGY

## 2024-11-21 PROCEDURE — 71250 CT THORAX DX C-: CPT

## 2024-12-03 ENCOUNTER — APPOINTMENT (OUTPATIENT)
Dept: PLASTIC SURGERY | Facility: CLINIC | Age: 74
End: 2024-12-03
Payer: MEDICARE

## 2024-12-05 ENCOUNTER — OFFICE VISIT (OUTPATIENT)
Dept: PRIMARY CARE | Facility: CLINIC | Age: 74
End: 2024-12-05
Payer: MEDICARE

## 2024-12-05 ENCOUNTER — LAB (OUTPATIENT)
Dept: LAB | Facility: LAB | Age: 74
End: 2024-12-05
Payer: MEDICARE

## 2024-12-05 DIAGNOSIS — R73.03 PREDIABETES: ICD-10-CM

## 2024-12-05 DIAGNOSIS — E78.5 DYSLIPIDEMIA: ICD-10-CM

## 2024-12-05 DIAGNOSIS — Z00.00 ANNUAL PHYSICAL EXAM: ICD-10-CM

## 2024-12-05 DIAGNOSIS — R05.3 CHRONIC COUGH: ICD-10-CM

## 2024-12-05 DIAGNOSIS — R91.8 LUNG INFILTRATE ON CT: ICD-10-CM

## 2024-12-05 DIAGNOSIS — R93.1 ELEVATED CORONARY ARTERY CALCIUM SCORE: ICD-10-CM

## 2024-12-05 DIAGNOSIS — Z00.00 ANNUAL PHYSICAL EXAM: Primary | ICD-10-CM

## 2024-12-05 LAB — POC HEMOGLOBIN A1C: 5.8 % (ref 4.2–6.5)

## 2024-12-05 PROCEDURE — 84443 ASSAY THYROID STIM HORMONE: CPT

## 2024-12-05 PROCEDURE — 80053 COMPREHEN METABOLIC PANEL: CPT

## 2024-12-05 PROCEDURE — 85025 COMPLETE CBC W/AUTO DIFF WBC: CPT

## 2024-12-05 PROCEDURE — 36415 COLL VENOUS BLD VENIPUNCTURE: CPT

## 2024-12-05 RX ORDER — ATORVASTATIN CALCIUM 10 MG/1
10 TABLET, FILM COATED ORAL DAILY
Qty: 100 TABLET | Refills: 1 | Status: SHIPPED | OUTPATIENT
Start: 2024-12-05 | End: 2026-01-09

## 2024-12-05 RX ORDER — AZITHROMYCIN 250 MG/1
TABLET, FILM COATED ORAL
Qty: 6 TABLET | Refills: 0 | Status: SHIPPED | OUTPATIENT
Start: 2024-12-05 | End: 2024-12-10

## 2024-12-05 ASSESSMENT — PATIENT HEALTH QUESTIONNAIRE - PHQ9
2. FEELING DOWN, DEPRESSED OR HOPELESS: NOT AT ALL
1. LITTLE INTEREST OR PLEASURE IN DOING THINGS: NOT AT ALL
SUM OF ALL RESPONSES TO PHQ9 QUESTIONS 1 AND 2: 0

## 2024-12-05 ASSESSMENT — ENCOUNTER SYMPTOMS
DEPRESSION: 0
OCCASIONAL FEELINGS OF UNSTEADINESS: 0
LOSS OF SENSATION IN FEET: 0

## 2024-12-06 LAB
ALBUMIN SERPL BCP-MCNC: 4.2 G/DL (ref 3.4–5)
ALP SERPL-CCNC: 47 U/L (ref 33–136)
ALT SERPL W P-5'-P-CCNC: 14 U/L (ref 7–45)
ANION GAP SERPL CALC-SCNC: 10 MMOL/L (ref 10–20)
AST SERPL W P-5'-P-CCNC: 22 U/L (ref 9–39)
BASOPHILS # BLD AUTO: 0.05 X10*3/UL (ref 0–0.1)
BASOPHILS NFR BLD AUTO: 0.7 %
BILIRUB SERPL-MCNC: 1 MG/DL (ref 0–1.2)
BUN SERPL-MCNC: 15 MG/DL (ref 6–23)
CALCIUM SERPL-MCNC: 10 MG/DL (ref 8.6–10.6)
CHLORIDE SERPL-SCNC: 103 MMOL/L (ref 98–107)
CO2 SERPL-SCNC: 30 MMOL/L (ref 21–32)
CREAT SERPL-MCNC: 0.83 MG/DL (ref 0.5–1.05)
EGFRCR SERPLBLD CKD-EPI 2021: 73 ML/MIN/1.73M*2
EOSINOPHIL # BLD AUTO: 0 X10*3/UL (ref 0–0.4)
EOSINOPHIL NFR BLD AUTO: 0 %
ERYTHROCYTE [DISTWIDTH] IN BLOOD BY AUTOMATED COUNT: 12.5 % (ref 11.5–14.5)
GLUCOSE SERPL-MCNC: 96 MG/DL (ref 74–99)
HCT VFR BLD AUTO: 43.3 % (ref 36–46)
HGB BLD-MCNC: 14 G/DL (ref 12–16)
IMM GRANULOCYTES # BLD AUTO: 0.02 X10*3/UL (ref 0–0.5)
IMM GRANULOCYTES NFR BLD AUTO: 0.3 % (ref 0–0.9)
LYMPHOCYTES # BLD AUTO: 2.33 X10*3/UL (ref 0.8–3)
LYMPHOCYTES NFR BLD AUTO: 34.6 %
MCH RBC QN AUTO: 30.7 PG (ref 26–34)
MCHC RBC AUTO-ENTMCNC: 32.3 G/DL (ref 32–36)
MCV RBC AUTO: 95 FL (ref 80–100)
MONOCYTES # BLD AUTO: 0.52 X10*3/UL (ref 0.05–0.8)
MONOCYTES NFR BLD AUTO: 7.7 %
NEUTROPHILS # BLD AUTO: 3.81 X10*3/UL (ref 1.6–5.5)
NEUTROPHILS NFR BLD AUTO: 56.7 %
NRBC BLD-RTO: 0 /100 WBCS (ref 0–0)
PLATELET # BLD AUTO: 322 X10*3/UL (ref 150–450)
POTASSIUM SERPL-SCNC: 5 MMOL/L (ref 3.5–5.3)
PROT SERPL-MCNC: 7.5 G/DL (ref 6.4–8.2)
RBC # BLD AUTO: 4.56 X10*6/UL (ref 4–5.2)
SODIUM SERPL-SCNC: 138 MMOL/L (ref 136–145)
TSH SERPL-ACNC: 2.21 MIU/L (ref 0.44–3.98)
WBC # BLD AUTO: 6.7 X10*3/UL (ref 4.4–11.3)

## 2024-12-07 VITALS
SYSTOLIC BLOOD PRESSURE: 134 MMHG | HEIGHT: 62 IN | HEART RATE: 65 BPM | WEIGHT: 96.8 LBS | DIASTOLIC BLOOD PRESSURE: 86 MMHG | BODY MASS INDEX: 17.81 KG/M2

## 2024-12-07 PROBLEM — Z00.00 ANNUAL PHYSICAL EXAM: Status: ACTIVE | Noted: 2024-12-07

## 2024-12-07 PROBLEM — R93.1 ELEVATED CORONARY ARTERY CALCIUM SCORE: Status: ACTIVE | Noted: 2024-12-07

## 2024-12-07 ASSESSMENT — ENCOUNTER SYMPTOMS
HEMATOLOGIC/LYMPHATIC NEGATIVE: 1
FEVER: 0
FATIGUE: 1
DIZZINESS: 1
NAUSEA: 0
GASTROINTESTINAL NEGATIVE: 1
ALLERGIC/IMMUNOLOGIC NEGATIVE: 1
SHORTNESS OF BREATH: 1
CARDIOVASCULAR NEGATIVE: 1
CHILLS: 1
MUSCULOSKELETAL NEGATIVE: 1
VOMITING: 0
ENDOCRINE NEGATIVE: 1
COUGH: 1
PSYCHIATRIC NEGATIVE: 1

## 2024-12-08 NOTE — PROGRESS NOTES
Subjective   Patient ID: Abena Shaw is a 74 y.o. female who presents for Dizziness (Lightheaded ) and Follow-up.      Dizziness  Associated symptoms include chills, coughing and fatigue. Pertinent negatives include no fever, nausea, rash or vomiting.     Patient is here for a number of complaints and states she has been dizzy has been sweating was treated with Zithromax in the summer felt better had a bronchoscopy and had a repeat CT scan that showed another similar appearance of mucous and tree-in-bud appearance throughout the lung Mycobacterium avium was negative  Had a recent CT scan which showed elevated coronary calcium score  Current Outpatient Medications on File Prior to Visit   Medication Sig Dispense Refill    aspirin 81 mg EC tablet Take 1 tablet (81 mg) by mouth once daily.      CALCIUM ORAL Calcium TABS   Refills: 0       Active      cholecalciferol (Vitamin D-3) 50 mcg (2,000 unit) capsule Take by mouth once daily.      cyanocobalamin, vitamin B-12, 2,500 mcg tablet, sublingual Place under the tongue once daily.      metoprolol succinate XL (Toprol-XL) 50 mg 24 hr tablet Take 1 tablet (50 mg) by mouth once daily. 90 tablet 3    pyridoxine (Vitamin B-6) 100 mg tablet Take 1 tablet (100 mg) by mouth once daily. 90 tablet 3     No current facility-administered medications on file prior to visit.        Review of Systems   Constitutional:  Positive for chills and fatigue. Negative for fever.   HENT: Negative.     Respiratory:  Positive for cough and shortness of breath.    Cardiovascular: Negative.    Gastrointestinal: Negative.  Negative for nausea and vomiting.   Endocrine: Negative.    Genitourinary: Negative.    Musculoskeletal: Negative.    Skin: Negative.  Negative for rash.   Allergic/Immunologic: Negative.    Neurological:  Positive for dizziness.   Hematological: Negative.    Psychiatric/Behavioral: Negative.     All other systems reviewed and are negative.      Objective   /86   " Pulse 65   Ht 1.575 m (5' 2\")   Wt (!) 43.9 kg (96 lb 12.8 oz)   BMI 17.70 kg/m²   BSA: 1.39 meters squared  Growth percentiles: Facility age limit for growth %celestino is 20 years. Facility age limit for growth %celestino is 20 years.   Lab on 12/05/2024   Component Date Value Ref Range Status    WBC 12/05/2024 6.7  4.4 - 11.3 x10*3/uL Final    nRBC 12/05/2024 0.0  0.0 - 0.0 /100 WBCs Final    RBC 12/05/2024 4.56  4.00 - 5.20 x10*6/uL Final    Hemoglobin 12/05/2024 14.0  12.0 - 16.0 g/dL Final    Hematocrit 12/05/2024 43.3  36.0 - 46.0 % Final    MCV 12/05/2024 95  80 - 100 fL Final    MCH 12/05/2024 30.7  26.0 - 34.0 pg Final    MCHC 12/05/2024 32.3  32.0 - 36.0 g/dL Final    RDW 12/05/2024 12.5  11.5 - 14.5 % Final    Platelets 12/05/2024 322  150 - 450 x10*3/uL Final    Neutrophils % 12/05/2024 56.7  40.0 - 80.0 % Final    Immature Granulocytes %, Automated 12/05/2024 0.3  0.0 - 0.9 % Final    Immature Granulocyte Count (IG) includes promyelocytes, myelocytes and metamyelocytes but does not include bands. Percent differential counts (%) should be interpreted in the context of the absolute cell counts (cells/UL).    Lymphocytes % 12/05/2024 34.6  13.0 - 44.0 % Final    Monocytes % 12/05/2024 7.7  2.0 - 10.0 % Final    Eosinophils % 12/05/2024 0.0  0.0 - 6.0 % Final    Basophils % 12/05/2024 0.7  0.0 - 2.0 % Final    Neutrophils Absolute 12/05/2024 3.81  1.60 - 5.50 x10*3/uL Final    Percent differential counts (%) should be interpreted in the context of the absolute cell counts (cells/uL).    Immature Granulocytes Absolute, Au* 12/05/2024 0.02  0.00 - 0.50 x10*3/uL Final    Lymphocytes Absolute 12/05/2024 2.33  0.80 - 3.00 x10*3/uL Final    Monocytes Absolute 12/05/2024 0.52  0.05 - 0.80 x10*3/uL Final    Eosinophils Absolute 12/05/2024 0.00  0.00 - 0.40 x10*3/uL Final    Basophils Absolute 12/05/2024 0.05  0.00 - 0.10 x10*3/uL Final    Thyroid Stimulating Hormone 12/05/2024 2.21  0.44 - 3.98 mIU/L Final    Glucose " 12/05/2024 96  74 - 99 mg/dL Final    Sodium 12/05/2024 138  136 - 145 mmol/L Final    Potassium 12/05/2024 5.0  3.5 - 5.3 mmol/L Final    Chloride 12/05/2024 103  98 - 107 mmol/L Final    Bicarbonate 12/05/2024 30  21 - 32 mmol/L Final    Anion Gap 12/05/2024 10  10 - 20 mmol/L Final    Urea Nitrogen 12/05/2024 15  6 - 23 mg/dL Final    Creatinine 12/05/2024 0.83  0.50 - 1.05 mg/dL Final    eGFR 12/05/2024 73  >60 mL/min/1.73m*2 Final    Calculations of estimated GFR are performed using the 2021 CKD-EPI Study Refit equation without the race variable for the IDMS-Traceable creatinine methods.  https://jasn.asnjournals.org/content/early/2021/09/22/ASN.1479460353    Calcium 12/05/2024 10.0  8.6 - 10.6 mg/dL Final    Albumin 12/05/2024 4.2  3.4 - 5.0 g/dL Final    Alkaline Phosphatase 12/05/2024 47  33 - 136 U/L Final    Total Protein 12/05/2024 7.5  6.4 - 8.2 g/dL Final    AST 12/05/2024 22  9 - 39 U/L Final    Bilirubin, Total 12/05/2024 1.0  0.0 - 1.2 mg/dL Final    ALT 12/05/2024 14  7 - 45 U/L Final    Patients treated with Sulfasalazine may generate falsely decreased results for ALT.   Office Visit on 12/05/2024   Component Date Value Ref Range Status    POC HEMOGLOBIN A1c 12/05/2024 5.8  4.2 - 6.5 % Final      Physical Exam  Constitutional:       General: She is in acute distress.      Appearance: She is toxic-appearing.   HENT:      Head: Normocephalic and atraumatic.      Right Ear: Tympanic membrane normal.      Left Ear: Tympanic membrane normal.      Nose: No congestion or rhinorrhea.      Mouth/Throat:      Pharynx: No oropharyngeal exudate or posterior oropharyngeal erythema.   Cardiovascular:      Rate and Rhythm: Normal rate and regular rhythm.   Pulmonary:      Effort: Pulmonary effort is normal.      Breath sounds: No rales.   Abdominal:      General: Abdomen is flat.   Skin:     General: Skin is warm.   Neurological:      General: No focal deficit present.      Mental Status: She is alert.          Assessment/Plan   Problem List Items Addressed This Visit       Cough    Relevant Medications    azithromycin (Zithromax) 250 mg tablet    atorvastatin (Lipitor) 10 mg tablet    Other Relevant Orders    CBC and Auto Differential (Completed)    Respiratory Culture/Smear    Dyslipidemia    Relevant Orders    CBC and Auto Differential (Completed)    Prediabetes    Relevant Orders    POCT glycosylated hemoglobin (Hb A1C) manually resulted (Completed)    CBC and Auto Differential (Completed)    Comprehensive Metabolic Panel (Completed)    Lung infiltrate on CT     Patient was started on Zithromax         Annual physical exam - Primary    Relevant Orders    CBC and Auto Differential (Completed)    TSH with reflex to Free T4 if abnormal (Completed)    Elevated coronary artery calcium score    Relevant Medications    atorvastatin (Lipitor) 10 mg tablet   30 minutes spent answering patient's questions this provider also evaluated with pulmonology urgent department was set up with the patient to see pulmonologist to discuss CT scan

## 2024-12-09 ENCOUNTER — LAB (OUTPATIENT)
Dept: LAB | Facility: LAB | Age: 74
End: 2024-12-09
Payer: MEDICARE

## 2024-12-09 DIAGNOSIS — R05.3 CHRONIC COUGH: ICD-10-CM

## 2024-12-09 PROCEDURE — 87075 CULTR BACTERIA EXCEPT BLOOD: CPT

## 2024-12-09 PROCEDURE — 87070 CULTURE OTHR SPECIMN AEROBIC: CPT

## 2024-12-09 PROCEDURE — 87205 SMEAR GRAM STAIN: CPT

## 2024-12-09 PROCEDURE — 87077 CULTURE AEROBIC IDENTIFY: CPT

## 2024-12-09 PROCEDURE — 87186 SC STD MICRODIL/AGAR DIL: CPT

## 2024-12-11 ENCOUNTER — APPOINTMENT (OUTPATIENT)
Dept: PULMONOLOGY | Facility: HOSPITAL | Age: 74
End: 2024-12-11
Payer: MEDICARE

## 2024-12-12 LAB
BACTERIA SPEC RESP CULT: ABNORMAL
BACTERIA SPEC RESP CULT: ABNORMAL
GRAM STN SPEC: ABNORMAL
GRAM STN SPEC: ABNORMAL

## 2025-01-10 ENCOUNTER — APPOINTMENT (OUTPATIENT)
Dept: RADIOLOGY | Facility: HOSPITAL | Age: 75
End: 2025-01-10
Payer: MEDICARE

## 2025-01-10 ENCOUNTER — HOSPITAL ENCOUNTER (EMERGENCY)
Facility: HOSPITAL | Age: 75
Discharge: HOME | End: 2025-01-10
Attending: EMERGENCY MEDICINE
Payer: MEDICARE

## 2025-01-10 ENCOUNTER — APPOINTMENT (OUTPATIENT)
Dept: CARDIOLOGY | Facility: HOSPITAL | Age: 75
End: 2025-01-10
Payer: MEDICARE

## 2025-01-10 VITALS
HEIGHT: 62 IN | TEMPERATURE: 98.2 F | WEIGHT: 95 LBS | HEART RATE: 77 BPM | RESPIRATION RATE: 18 BRPM | DIASTOLIC BLOOD PRESSURE: 68 MMHG | OXYGEN SATURATION: 96 % | SYSTOLIC BLOOD PRESSURE: 102 MMHG | BODY MASS INDEX: 17.48 KG/M2

## 2025-01-10 DIAGNOSIS — E86.0 DEHYDRATION: ICD-10-CM

## 2025-01-10 DIAGNOSIS — N39.0 URINARY TRACT INFECTION WITHOUT HEMATURIA, SITE UNSPECIFIED: Primary | ICD-10-CM

## 2025-01-10 DIAGNOSIS — J10.1 INFLUENZA A: ICD-10-CM

## 2025-01-10 LAB
ALBUMIN SERPL BCP-MCNC: 3.7 G/DL (ref 3.4–5)
ALP SERPL-CCNC: 40 U/L (ref 33–136)
ALT SERPL W P-5'-P-CCNC: 22 U/L (ref 7–45)
ANION GAP SERPL CALC-SCNC: 11 MMOL/L (ref 10–20)
APPEARANCE UR: CLEAR
AST SERPL W P-5'-P-CCNC: 29 U/L (ref 9–39)
ATRIAL RATE: 91 BPM
BACTERIA #/AREA URNS AUTO: ABNORMAL /HPF
BASOPHILS # BLD AUTO: 0.03 X10*3/UL (ref 0–0.1)
BASOPHILS NFR BLD AUTO: 0.4 %
BILIRUB SERPL-MCNC: 0.6 MG/DL (ref 0–1.2)
BILIRUB UR STRIP.AUTO-MCNC: NEGATIVE MG/DL
BNP SERPL-MCNC: 47 PG/ML (ref 0–99)
BUN SERPL-MCNC: 16 MG/DL (ref 6–23)
CALCIUM SERPL-MCNC: 8.4 MG/DL (ref 8.6–10.3)
CARDIAC TROPONIN I PNL SERPL HS: 14 NG/L (ref 0–13)
CARDIAC TROPONIN I PNL SERPL HS: 19 NG/L (ref 0–13)
CARDIAC TROPONIN I PNL SERPL HS: 22 NG/L (ref 0–13)
CHLORIDE SERPL-SCNC: 106 MMOL/L (ref 98–107)
CO2 SERPL-SCNC: 26 MMOL/L (ref 21–32)
COLOR UR: YELLOW
CREAT SERPL-MCNC: 0.81 MG/DL (ref 0.5–1.05)
EGFRCR SERPLBLD CKD-EPI 2021: 75 ML/MIN/1.73M*2
EOSINOPHIL # BLD AUTO: 0 X10*3/UL (ref 0–0.4)
EOSINOPHIL NFR BLD AUTO: 0 %
ERYTHROCYTE [DISTWIDTH] IN BLOOD BY AUTOMATED COUNT: 12.8 % (ref 11.5–14.5)
FLUAV RNA RESP QL NAA+PROBE: DETECTED
FLUBV RNA RESP QL NAA+PROBE: NOT DETECTED
GLUCOSE SERPL-MCNC: 96 MG/DL (ref 74–99)
GLUCOSE UR STRIP.AUTO-MCNC: NORMAL MG/DL
HCT VFR BLD AUTO: 34.2 % (ref 36–46)
HGB BLD-MCNC: 10.9 G/DL (ref 12–16)
IMM GRANULOCYTES # BLD AUTO: 0.03 X10*3/UL (ref 0–0.5)
IMM GRANULOCYTES NFR BLD AUTO: 0.4 % (ref 0–0.9)
KETONES UR STRIP.AUTO-MCNC: ABNORMAL MG/DL
LEUKOCYTE ESTERASE UR QL STRIP.AUTO: ABNORMAL
LYMPHOCYTES # BLD AUTO: 0.78 X10*3/UL (ref 0.8–3)
LYMPHOCYTES NFR BLD AUTO: 10.3 %
MCH RBC QN AUTO: 30.7 PG (ref 26–34)
MCHC RBC AUTO-ENTMCNC: 31.9 G/DL (ref 32–36)
MCV RBC AUTO: 96 FL (ref 80–100)
MONOCYTES # BLD AUTO: 0.7 X10*3/UL (ref 0.05–0.8)
MONOCYTES NFR BLD AUTO: 9.2 %
MUCOUS THREADS #/AREA URNS AUTO: ABNORMAL /LPF
NEUTROPHILS # BLD AUTO: 6.04 X10*3/UL (ref 1.6–5.5)
NEUTROPHILS NFR BLD AUTO: 79.7 %
NITRITE UR QL STRIP.AUTO: NEGATIVE
NRBC BLD-RTO: 0 /100 WBCS (ref 0–0)
P AXIS: 80 DEGREES
P OFFSET: 173 MS
P ONSET: 119 MS
PH UR STRIP.AUTO: 6 [PH]
PLATELET # BLD AUTO: 163 X10*3/UL (ref 150–450)
POTASSIUM SERPL-SCNC: 3.5 MMOL/L (ref 3.5–5.3)
PR INTERVAL: 158 MS
PROT SERPL-MCNC: 6.8 G/DL (ref 6.4–8.2)
PROT UR STRIP.AUTO-MCNC: ABNORMAL MG/DL
Q ONSET: 198 MS
QRS COUNT: 15 BEATS
QRS DURATION: 122 MS
QT INTERVAL: 376 MS
QTC CALCULATION(BAZETT): 462 MS
QTC FREDERICIA: 432 MS
R AXIS: -69 DEGREES
RBC # BLD AUTO: 3.55 X10*6/UL (ref 4–5.2)
RBC # UR STRIP.AUTO: ABNORMAL /UL
RBC #/AREA URNS AUTO: >20 /HPF
RSV RNA RESP QL NAA+PROBE: NOT DETECTED
SARS-COV-2 RNA RESP QL NAA+PROBE: NOT DETECTED
SODIUM SERPL-SCNC: 139 MMOL/L (ref 136–145)
SP GR UR STRIP.AUTO: 1.03
SQUAMOUS #/AREA URNS AUTO: ABNORMAL /HPF
T AXIS: 69 DEGREES
T OFFSET: 386 MS
UROBILINOGEN UR STRIP.AUTO-MCNC: ABNORMAL MG/DL
VENTRICULAR RATE: 91 BPM
WBC # BLD AUTO: 7.6 X10*3/UL (ref 4.4–11.3)
WBC #/AREA URNS AUTO: ABNORMAL /HPF

## 2025-01-10 PROCEDURE — 85025 COMPLETE CBC W/AUTO DIFF WBC: CPT

## 2025-01-10 PROCEDURE — 93005 ELECTROCARDIOGRAM TRACING: CPT

## 2025-01-10 PROCEDURE — 36415 COLL VENOUS BLD VENIPUNCTURE: CPT

## 2025-01-10 PROCEDURE — 87637 SARSCOV2&INF A&B&RSV AMP PRB: CPT

## 2025-01-10 PROCEDURE — 96361 HYDRATE IV INFUSION ADD-ON: CPT

## 2025-01-10 PROCEDURE — 81001 URINALYSIS AUTO W/SCOPE: CPT

## 2025-01-10 PROCEDURE — 71046 X-RAY EXAM CHEST 2 VIEWS: CPT

## 2025-01-10 PROCEDURE — 84484 ASSAY OF TROPONIN QUANT: CPT

## 2025-01-10 PROCEDURE — 70450 CT HEAD/BRAIN W/O DYE: CPT | Performed by: RADIOLOGY

## 2025-01-10 PROCEDURE — 71046 X-RAY EXAM CHEST 2 VIEWS: CPT | Performed by: RADIOLOGY

## 2025-01-10 PROCEDURE — 2500000004 HC RX 250 GENERAL PHARMACY W/ HCPCS (ALT 636 FOR OP/ED)

## 2025-01-10 PROCEDURE — 72125 CT NECK SPINE W/O DYE: CPT | Performed by: RADIOLOGY

## 2025-01-10 PROCEDURE — 83880 ASSAY OF NATRIURETIC PEPTIDE: CPT

## 2025-01-10 PROCEDURE — 99285 EMERGENCY DEPT VISIT HI MDM: CPT | Mod: 25 | Performed by: EMERGENCY MEDICINE

## 2025-01-10 PROCEDURE — 80053 COMPREHEN METABOLIC PANEL: CPT

## 2025-01-10 PROCEDURE — 87086 URINE CULTURE/COLONY COUNT: CPT | Mod: AHULAB

## 2025-01-10 PROCEDURE — 2500000001 HC RX 250 WO HCPCS SELF ADMINISTERED DRUGS (ALT 637 FOR MEDICARE OP)

## 2025-01-10 PROCEDURE — 2500000001 HC RX 250 WO HCPCS SELF ADMINISTERED DRUGS (ALT 637 FOR MEDICARE OP): Performed by: EMERGENCY MEDICINE

## 2025-01-10 PROCEDURE — 72125 CT NECK SPINE W/O DYE: CPT

## 2025-01-10 PROCEDURE — 70450 CT HEAD/BRAIN W/O DYE: CPT

## 2025-01-10 PROCEDURE — 96360 HYDRATION IV INFUSION INIT: CPT

## 2025-01-10 RX ORDER — OSELTAMIVIR PHOSPHATE 75 MG/1
75 CAPSULE ORAL EVERY 12 HOURS
Qty: 10 CAPSULE | Refills: 0 | Status: SHIPPED | OUTPATIENT
Start: 2025-01-10 | End: 2025-01-15

## 2025-01-10 RX ORDER — GUAIFENESIN/DEXTROMETHORPHAN 100-10MG/5
5 SYRUP ORAL ONCE
Status: COMPLETED | OUTPATIENT
Start: 2025-01-10 | End: 2025-01-10

## 2025-01-10 RX ORDER — NITROFURANTOIN 25; 75 MG/1; MG/1
100 CAPSULE ORAL 2 TIMES DAILY
Qty: 10 CAPSULE | Refills: 0 | Status: SHIPPED | OUTPATIENT
Start: 2025-01-10 | End: 2025-01-15

## 2025-01-10 RX ORDER — ACETAMINOPHEN 325 MG/1
650 TABLET ORAL ONCE
Status: COMPLETED | OUTPATIENT
Start: 2025-01-10 | End: 2025-01-10

## 2025-01-10 RX ADMIN — ACETAMINOPHEN 650 MG: 325 TABLET, FILM COATED ORAL at 10:11

## 2025-01-10 RX ADMIN — SODIUM CHLORIDE, POTASSIUM CHLORIDE, SODIUM LACTATE AND CALCIUM CHLORIDE 1000 ML: 600; 310; 30; 20 INJECTION, SOLUTION INTRAVENOUS at 10:13

## 2025-01-10 RX ADMIN — GUAIFENESIN SYRUP AND DEXTROMETHORPHAN 5 ML: 100; 10 SYRUP ORAL at 14:00

## 2025-01-10 ASSESSMENT — PAIN - FUNCTIONAL ASSESSMENT: PAIN_FUNCTIONAL_ASSESSMENT: 0-10

## 2025-01-10 ASSESSMENT — PAIN SCALES - GENERAL: PAINLEVEL_OUTOF10: 0 - NO PAIN

## 2025-01-10 NOTE — ED PROVIDER NOTES
Emergency Department Provider Note          History of Present Illness     CC: Fall     History provided by: Patient  Limitations to History: None    HPI:   Abena Shaw is a 74 y.o.female with PMH HLD, T2DM, presenting to the Emergency Department for flulike symptoms and lightheadedness.  Accompanied today by her .  Reports for the past 2 days now has had cough, congestion, runny nose, and generalized weakness and malaise.  Had 2 episodes where she felt so lightheaded that she was going to pass out. No falls. Has had decreased p.o. intake during this time and has not been eating or drinking much water for the past couple days now.   reports has been dealing with a flulike illness for the past week as well.  Denies chest pain, palpitations, abdominal pain, or changes in urination/stool for us today.    Records Reviewed: Recent available ED and inpatient notes reviewed in EMR.    PMHx/PSHx:  Per HPI.   - has a past medical history of Anxiety, Esophagitis, H/O paroxysmal supraventricular tachycardia, Hyperlipidemia, Other specified noninflammatory disorders of vulva and perineum, Prediabetes, Reactive airway disease ([facility]-HCC), and WPW (Renea-Parkinson-White syndrome).  - has a past surgical history that includes Cholecystectomy (10/15/2015); Cardiac electrophysiology study and ablation; and Breast surgery.  - has Abdominal pain; Abnormal finding in urine; Breast asymmetry; Cough; Dyslipidemia; Epigastric pain; Esophagitis; Fatigue; Hematuria; Memory loss; Osteopenia; Pigmented skin lesion; Prediabetes; Reactive airway disease ([facility]HS-HCC); TMJ arthropathy; Vitamin B12 deficiency; Vitamin D deficiency; Vulvar lesion; Weight loss; Anxiety; [place] (dyspnea on exertion); Lightheadedness; Visit for screening mammogram; Postmenopausal; Colon cancer screening; Mass of breast; Disorder of breast, unspecified; Routine general medical examination at health care facility; Former smoker; LLQ pain; Nicotine  dependence, cigarettes, uncomplicated; Routine general medical examination at a health care facility; Lung infiltrate on CT; H/O paroxysmal supraventricular tachycardia; Annual physical exam; and Elevated coronary artery calcium score on their problem list.    Medications:  Current Outpatient Medications   Medication Instructions    aspirin 81 mg, Daily    atorvastatin (LIPITOR) 10 mg, oral, Daily    CALCIUM ORAL Calcium TABS   Refills: 0       Active    cholecalciferol (Vitamin D-3) 50 mcg (2,000 unit) capsule Daily RT    cyanocobalamin, vitamin B-12, 2,500 mcg tablet, sublingual Daily RT    metoprolol succinate XL (TOPROL-XL) 50 mg, oral, Daily    nitrofurantoin, macrocrystal-monohydrate, (Macrobid) 100 mg capsule 100 mg, oral, 2 times daily    oseltamivir (TAMIFLU) 75 mg, oral, Every 12 hours    pyridoxine (VITAMIN B-6) 100 mg, oral, Daily        Allergies:  Patient has no known allergies.    Social History:  - Tobacco:  reports that she quit smoking about 6 years ago. Her smoking use included cigarettes. She started smoking about 57 years ago. She has a 63.8 pack-year smoking history. She has been exposed to tobacco smoke. She has never used smokeless tobacco.   - Alcohol:  reports no history of alcohol use.   - Illicit Drugs:  reports no history of drug use.     ROS:  Per HPI.       Physical Exam     Triage Vitals:  T (!) 38.1 °C (100.5 °F)  HR 98  /69  RR 18  O2 96 % None (Room air)    General: Awake, alert, in no acute distress  Eyes: Gaze conjugate.  No scleral icterus or injection  HENT: Normo-cephalic, atraumatic. No stridor  CV: Regular rate, regular rhythm. Radial pulses 2+ bilaterally  Resp: Breathing non-labored, speaking in full sentences.  Clear to auscultation bilaterally  GI: Soft, non-distended, non-tender. No rebound or guarding.  MSK/Extremities: No gross bony deformities. Moving all extremities  Skin: Warm. Appropriate color  Neuro: Alert. Oriented. Face symmetric. Speech is fluent.   Gross strength and sensation intact in b/l UE and LEs  Psych: Appropriate mood and affect          Jose Coma Scale Score: 15                    Medical Decision Making & ED Course     EKG: EKG interpreted by myself. Please see ED Course for full interpretation.    Medical Decision Making   Abena Shaw is a 74 y.o.female presenting to the Emergency Department for flulike symptoms, lightheadedness, and syncope/fall.  On arrival, febrile to 100.5, rest of vital signs within normal limits.  Symptomatically treated with Tylenol here in the ED.  On examination, patient has notable congestion and cough in the room.  Clear heart and lung sounds bilaterally.  Mentating appropriately, 5/5 strength in the upper and lower extremities with sensation and cranial nerves intact.  Lower concern for acute intracranial process.  Given her age and unexplained fall, will get CT of the head and cervical spine for definitive trauma rule out.  Also get basic labs, EKG, troponin, BNP for syncopal rule out.  Believe the likely cause of her symptoms today is dehydration and was treated symptomatically with !L fluids.    Update: Labs notable for white count of 7.6, lower concern for systemic infectious process.  Hemoglobin stable at 10.9, lower concern for acute blood loss anemia.  Chemistries relatively unremarkable.  Urinalysis borderline for UTI with leukocyte esterase and 2+ bacteria.  Troponin mildly elevated 14, repeat stable.  EKG showed normal sinus rhythm.  Lower concern for cardiac cause of her symptoms today or syncopal-like episode. CT the head and cervical spine negative for acute traumatic injury.  Chest x-ray showed no acute cardiopulmonary process.  On reassessment after fluids, Tylenol, Robitussin, patient feels symptomatically improved.  Believe the likely cause of her symptoms today is flu a +/- uti.  Offered her admission today for observation however they requested to go home.  I believe this is an  appropriate disposition for her as she is ambulating well and vital signs are stable.  Will be discharged home with prescriptions for Tamiflu and Macrobid, close PCP follow-up and strict precautions.    ED Course as of 01/10/25 1402   Fri Vidal 10, 2025   0953 EKG my independent interpretation: Normal sinus rhythm, heart rate 95, left axis deviation, right bundle branch block, no ST elevations or depressions [JG]   1123 CT cervical spine wo IV contrast [AS]      ED Course User Index  [AS] Wan Lozada MD  [JG] Rosa Sidhu MD         Diagnoses as of 01/10/25 1402   Urinary tract infection without hematuria, site unspecified   Dehydration   Influenza A       Independent Result Review and Interpretation: Relevant laboratory and radiographic results were reviewed and independently interpreted by myself.  As necessary, they are commented on in the ED Course.    Chronic conditions affecting the patient's care: As documented above in MDM.      Disposition   Discharge    Wan Lozada MD  Emergency Medicine PGY3      Procedures     Procedures ? SmartLinks last updated 1/10/2025 2:02 PM        Wan Lozada MD  Resident  01/10/25 1402

## 2025-01-10 NOTE — ED TRIAGE NOTES
Pt arrives to ED via EMS for a fall at home. Pt states she became lightheaded and fell while in the bathroom. Pt denies LOC or blood thinners. Pt states she has been feeling weak. Pt's  has the Flu. Pt denies physical complaints of pain or injury.

## 2025-01-11 LAB — BACTERIA UR CULT: NO GROWTH

## 2025-01-19 LAB
ATRIAL RATE: 91 BPM
P AXIS: 80 DEGREES
P OFFSET: 173 MS
P ONSET: 119 MS
PR INTERVAL: 158 MS
Q ONSET: 198 MS
QRS COUNT: 15 BEATS
QRS DURATION: 122 MS
QT INTERVAL: 376 MS
QTC CALCULATION(BAZETT): 462 MS
QTC FREDERICIA: 432 MS
R AXIS: -69 DEGREES
T AXIS: 69 DEGREES
T OFFSET: 386 MS
VENTRICULAR RATE: 91 BPM

## 2025-02-17 ENCOUNTER — APPOINTMENT (OUTPATIENT)
Dept: CARDIOLOGY | Facility: CLINIC | Age: 75
End: 2025-02-17
Payer: MEDICARE

## 2025-02-17 ENCOUNTER — OFFICE VISIT (OUTPATIENT)
Dept: CARDIOLOGY | Facility: CLINIC | Age: 75
End: 2025-02-17
Payer: MEDICARE

## 2025-02-17 VITALS
HEART RATE: 61 BPM | DIASTOLIC BLOOD PRESSURE: 60 MMHG | SYSTOLIC BLOOD PRESSURE: 132 MMHG | WEIGHT: 100 LBS | HEIGHT: 61 IN | BODY MASS INDEX: 18.88 KG/M2 | OXYGEN SATURATION: 100 %

## 2025-02-17 DIAGNOSIS — I45.6 WOLFF-PARKINSON-WHITE SYNDROME: ICD-10-CM

## 2025-02-17 DIAGNOSIS — R42 DIZZINESS: ICD-10-CM

## 2025-02-17 DIAGNOSIS — R06.09 DOE (DYSPNEA ON EXERTION): ICD-10-CM

## 2025-02-17 DIAGNOSIS — I47.10 SUPRAVENTRICULAR TACHYCARDIA, PAROXYSMAL (CMS-HCC): ICD-10-CM

## 2025-02-17 DIAGNOSIS — E78.5 DYSLIPIDEMIA: Primary | ICD-10-CM

## 2025-02-17 DIAGNOSIS — R55 NEAR SYNCOPE: ICD-10-CM

## 2025-02-17 PROCEDURE — 99215 OFFICE O/P EST HI 40 MIN: CPT | Performed by: STUDENT IN AN ORGANIZED HEALTH CARE EDUCATION/TRAINING PROGRAM

## 2025-02-17 PROCEDURE — 1159F MED LIST DOCD IN RCRD: CPT | Performed by: STUDENT IN AN ORGANIZED HEALTH CARE EDUCATION/TRAINING PROGRAM

## 2025-02-17 PROCEDURE — 1123F ACP DISCUSS/DSCN MKR DOCD: CPT | Performed by: STUDENT IN AN ORGANIZED HEALTH CARE EDUCATION/TRAINING PROGRAM

## 2025-02-17 PROCEDURE — G2211 COMPLEX E/M VISIT ADD ON: HCPCS | Performed by: STUDENT IN AN ORGANIZED HEALTH CARE EDUCATION/TRAINING PROGRAM

## 2025-02-17 NOTE — PATIENT INSTRUCTIONS
We will plan to perform an event monitor.      We will obtain a transthoracic echocardiogram for structural evaluation including ejection fraction, assessment of regional wall motion abnormalities or valvular disease, and further evaluation of hemodynamics.    Please continue current cardiac medications including aspirin 81 mg daily and metoprolol succinate 50 mg daily.    Please followup with me in Cardiology clinic within the next 3 months.  Please return to clinic sooner or seek emergent care if your symptoms reoccur or worsen.

## 2025-02-17 NOTE — PROGRESS NOTES
Follow-up (1 year)     HPI:    Abena Shaw is a 74 y.o. female with pertinent history of dyslipidemia, prior smoking history, fatigue, esophagitis, memory loss, osteopenia, vitamin D deficiency, history of Renea-Parkinson-White status post ablation in 2014, preserved ejection fraction with impaired relaxation echo performed 4/21/2023, no clear ischemia nuclear stress test performed 5/1/2023, no obstructive carotid disease on Dopplers performed 5/25/2023 presents to cardiology clinic for follow-up after a recent emergency room visit for near syncope in the setting of influenza.     We reviewed her recent emergency room course was reviewed and discussed.  She is accompanied by her  who has questions and provides history.  She is doing relatively well.   She does note she continues to have some episodes of dizziness.  She has no prior to the emergency room visit in January she did have a near syncopal episode while standing in front of the microwave.  She has had minimal palpitations.  Dyspnea on exertion is stable.  We reviewed and discussed her prior echo, stress test, carotid Dopplers.  No exacerbating or relieving factors.  Patient denies chest pain and angina.  Pt denies orthopnea, and paroxysmal nocturnal dyspnea.  Pt denies worsening lower extremity edema.  Pt denies syncope.  No recent falls.  No fever or chills.  No cough.  No change in bowel or bladder habits.  No sick contacts.  No recent travel.    12 point review of systems including (Constitutional, Eyes, ENMT, Respiratory, Cardiac, Gastrointestinal, Neurological, Psychiatric, and Hematologic) was performed and is otherwise negative.    Past medical history reviewed:   has a past medical history of Anxiety, Esophagitis, H/O paroxysmal supraventricular tachycardia (06/21/2024), Hyperlipidemia, Other specified noninflammatory disorders of vulva and perineum (04/17/2017), Prediabetes, Reactive airway disease ([facility]), and WPW  (Renea-Parkinson-White syndrome).    Past surgical history reviewed:   has a past surgical history that includes Cholecystectomy (10/15/2015); Cardiac electrophysiology study and ablation; and Breast surgery.    Social history reviewed:   reports that she quit smoking about 6 years ago. Her smoking use included cigarettes. She started smoking about 57 years ago. She has a 63.8 pack-year smoking history. She has been exposed to tobacco smoke. She has never used smokeless tobacco. She reports that she does not drink alcohol and does not use drugs.     Family history reviewed:    Family History   Problem Relation Name Age of Onset    Breast cancer Mother         Allergies reviewed: Patient has no known allergies.     Medications reviewed:   Current Outpatient Medications   Medication Instructions    aspirin 81 mg, Daily    atorvastatin (LIPITOR) 10 mg, oral, Daily    CALCIUM ORAL Calcium TABS   Refills: 0       Active    cholecalciferol (Vitamin D-3) 50 mcg (2,000 unit) capsule Daily RT    cyanocobalamin, vitamin B-12, 2,500 mcg tablet, sublingual Daily RT    metoprolol succinate XL (TOPROL-XL) 50 mg, oral, Daily    pyridoxine (VITAMIN B-6) 100 mg, oral, Daily        Vitals reviewed: Visit Vitals  /60   Pulse 61       Physical Exam:   General:  Patient is awake, alert, and oriented.  Patient is in no acute distress.  HEENT:  Pupils equal and reactive.  Normocephalic.  Moist mucosa.    Neck:  No thyromegaly.  Normal Jugular Venous Pressure.  Cardiovascular:  Regular rate and rhythm.  Normal S1 and S2.  1/6 SAMEER.  Pulmonary:  Clear to auscultation bilaterally.  Abdomen:  Soft. Non-tender.   Non-distended.  Positive bowel sounds.  Lower Extremities:  2+ pedal pulses. No LE edema.  Neurologic:  Cranial nerves intact.  No focal deficit.   Skin: Skin warm and dry, normal skin turgor.   Psychiatric: Normal affect.    Last Labs:  CBC -      Lab Results   Component Value Date    WBC 7.6 01/10/2025    HGB 10.9 (L)  01/10/2025    HCT 34.2 (L) 01/10/2025     01/10/2025        CMP-  Lab Results   Component Value Date    GLUCOSE 96 01/10/2025     01/10/2025    K 3.5 01/10/2025     01/10/2025    CO2 26 01/10/2025    ANIONGAP 11 01/10/2025    BUN 16 01/10/2025    CREATININE 0.81 01/10/2025    EGFR 75 01/10/2025    CALCIUM 8.4 (L) 01/10/2025    PROT 6.8 01/10/2025    ALBUMIN 3.7 01/10/2025    AST 29 01/10/2025    ALT 22 01/10/2025    ALKPHOS 40 01/10/2025    BILITOT 0.6 01/10/2025        LIPIDS-  Lab Results   Component Value Date    CHOL 182 05/13/2024    TRIG 68 05/13/2024    HDL 71.4 05/13/2024    CHHDL 2.5 05/13/2024    VLDL 14 05/13/2024        OTHERS-  Lab Results   Component Value Date    HGBA1C 5.8 12/05/2024    BNP 47 01/10/2025        I personally reviewed the patient's recent vitals, labs, medications, orders, EKGs, pertinent cardiac imaging/ echocardiography and ischemic evaluations including stress testing.    Assessment and Plan:      Abena Shaw is a 74 y.o. female with pertinent history of dyslipidemia, prior smoking history, fatigue, esophagitis, memory loss, osteopenia, vitamin D deficiency, history of Renea-Parkinson-White status post ablation in 2014, preserved ejection fraction with impaired relaxation echo performed 4/21/2023, no clear ischemia nuclear stress test performed 5/1/2023, no obstructive carotid disease on Dopplers performed 5/25/2023 presents to cardiology clinic for follow-up after a recent emergency room visit for near syncope in the setting of influenza.  We reviewed her recent emergency room course was reviewed and discussed.  She is accompanied by her  who has questions and provides history.  She is doing relatively well.   She does note she continues to have some episodes of dizziness.  She has no prior to the emergency room visit in January she did have a near syncopal episode while standing in front of the microwave.  She has had minimal palpitations.   Dyspnea on exertion is stable.  We reviewed and discussed her prior echo, stress test, carotid Dopplers.     Given the patient's symptoms and in order to further evaluate possible arrhythmias, wW    Thank you for allowing me to participate in their care.  Please feel free to call me with any further questions or concerns.        Gutierrez Willard MD, FAC, MELA FINN  Division of Cardiovascular Medicine  Medical Director, [facility] [place]  Clinical , [employer] [facility] [employer]  [email or web address]   Office:  [phone]

## 2025-02-26 ENCOUNTER — APPOINTMENT (OUTPATIENT)
Dept: CARDIOLOGY | Facility: CLINIC | Age: 75
End: 2025-02-26
Payer: MEDICARE

## 2025-03-07 ENCOUNTER — ANCILLARY PROCEDURE (OUTPATIENT)
Dept: CARDIOLOGY | Facility: CLINIC | Age: 75
End: 2025-03-07
Payer: MEDICARE

## 2025-03-07 ENCOUNTER — APPOINTMENT (OUTPATIENT)
Dept: CARDIOLOGY | Facility: CLINIC | Age: 75
End: 2025-03-07
Payer: MEDICARE

## 2025-03-07 DIAGNOSIS — I45.6 WOLFF-PARKINSON-WHITE SYNDROME: ICD-10-CM

## 2025-03-07 DIAGNOSIS — E78.5 DYSLIPIDEMIA: ICD-10-CM

## 2025-03-07 DIAGNOSIS — R06.09 DOE (DYSPNEA ON EXERTION): ICD-10-CM

## 2025-03-07 DIAGNOSIS — I47.10 SUPRAVENTRICULAR TACHYCARDIA, PAROXYSMAL (CMS-HCC): ICD-10-CM

## 2025-03-07 DIAGNOSIS — R55 NEAR SYNCOPE: ICD-10-CM

## 2025-03-07 DIAGNOSIS — R42 DIZZINESS: ICD-10-CM

## 2025-03-07 LAB
AORTIC VALVE PEAK VELOCITY: 1.2 M/S
AV PEAK GRADIENT: 6 MMHG
AVA (PEAK VEL): 3.18 CM2
EJECTION FRACTION APICAL 4 CHAMBER: 58.5
EJECTION FRACTION: 59 %
LEFT ATRIUM VOLUME AREA LENGTH INDEX BSA: 28.8 ML/M2
LEFT VENTRICLE INTERNAL DIMENSION DIASTOLE: 3.45 CM (ref 3.5–6)
LEFT VENTRICULAR OUTFLOW TRACT DIAMETER: 2.12 CM
MITRAL VALVE E/A RATIO: 2.12
RIGHT VENTRICLE FREE WALL PEAK S': 13 CM/S
RIGHT VENTRICLE PEAK SYSTOLIC PRESSURE: 36.1 MMHG
TRICUSPID ANNULAR PLANE SYSTOLIC EXCURSION: 2.2 CM

## 2025-03-07 PROCEDURE — 93247 EXT ECG>7D<15D SCAN A/R: CPT

## 2025-03-07 PROCEDURE — 93306 TTE W/DOPPLER COMPLETE: CPT

## 2025-03-07 PROCEDURE — 93306 TTE W/DOPPLER COMPLETE: CPT | Performed by: STUDENT IN AN ORGANIZED HEALTH CARE EDUCATION/TRAINING PROGRAM

## 2025-03-11 ENCOUNTER — APPOINTMENT (OUTPATIENT)
Dept: CARDIOLOGY | Facility: HOSPITAL | Age: 75
DRG: 242 | End: 2025-03-11
Payer: MEDICARE

## 2025-03-11 ENCOUNTER — HOSPITAL ENCOUNTER (INPATIENT)
Facility: HOSPITAL | Age: 75
LOS: 3 days | Discharge: HOME | DRG: 242 | End: 2025-03-14
Attending: EMERGENCY MEDICINE | Admitting: SURGERY
Payer: MEDICARE

## 2025-03-11 ENCOUNTER — APPOINTMENT (OUTPATIENT)
Dept: RADIOLOGY | Facility: HOSPITAL | Age: 75
DRG: 242 | End: 2025-03-11
Payer: MEDICARE

## 2025-03-11 DIAGNOSIS — R93.1 ELEVATED CORONARY ARTERY CALCIUM SCORE: ICD-10-CM

## 2025-03-11 DIAGNOSIS — I45.5 OTHER SPECIFIED HEART BLOCK: ICD-10-CM

## 2025-03-11 DIAGNOSIS — R00.1 SYMPTOMATIC BRADYCARDIA: Primary | ICD-10-CM

## 2025-03-11 DIAGNOSIS — Z95.0 STATUS POST PLACEMENT OF CARDIAC PACEMAKER: ICD-10-CM

## 2025-03-11 LAB
ALBUMIN SERPL BCP-MCNC: 3.8 G/DL (ref 3.4–5)
ALP SERPL-CCNC: 44 U/L (ref 33–136)
ALT SERPL W P-5'-P-CCNC: 45 U/L (ref 7–45)
ANION GAP SERPL CALC-SCNC: 10 MMOL/L (ref 10–20)
APPEARANCE UR: CLEAR
AST SERPL W P-5'-P-CCNC: 39 U/L (ref 9–39)
ATRIAL RATE: 74 BPM
BASOPHILS # BLD AUTO: 0.06 X10*3/UL (ref 0–0.1)
BASOPHILS NFR BLD AUTO: 0.7 %
BILIRUB SERPL-MCNC: 0.8 MG/DL (ref 0–1.2)
BILIRUB UR STRIP.AUTO-MCNC: NEGATIVE MG/DL
BNP SERPL-MCNC: 233 PG/ML (ref 0–99)
BUN SERPL-MCNC: 16 MG/DL (ref 6–23)
CALCIUM SERPL-MCNC: 9.2 MG/DL (ref 8.6–10.3)
CARDIAC TROPONIN I PNL SERPL HS: 8 NG/L (ref 0–13)
CHLORIDE SERPL-SCNC: 102 MMOL/L (ref 98–107)
CO2 SERPL-SCNC: 26 MMOL/L (ref 21–32)
COLOR UR: COLORLESS
CREAT SERPL-MCNC: 0.83 MG/DL (ref 0.5–1.05)
EGFRCR SERPLBLD CKD-EPI 2021: 73 ML/MIN/1.73M*2
EOSINOPHIL # BLD AUTO: 0.08 X10*3/UL (ref 0–0.4)
EOSINOPHIL NFR BLD AUTO: 1 %
ERYTHROCYTE [DISTWIDTH] IN BLOOD BY AUTOMATED COUNT: 13.2 % (ref 11.5–14.5)
FLUAV RNA RESP QL NAA+PROBE: NOT DETECTED
FLUBV RNA RESP QL NAA+PROBE: NOT DETECTED
GLUCOSE SERPL-MCNC: 93 MG/DL (ref 74–99)
GLUCOSE UR STRIP.AUTO-MCNC: NORMAL MG/DL
HCT VFR BLD AUTO: 35.5 % (ref 36–46)
HGB BLD-MCNC: 11.7 G/DL (ref 12–16)
IMM GRANULOCYTES # BLD AUTO: 0.02 X10*3/UL (ref 0–0.5)
IMM GRANULOCYTES NFR BLD AUTO: 0.2 % (ref 0–0.9)
INR PPP: 1 (ref 0.9–1.1)
KETONES UR STRIP.AUTO-MCNC: ABNORMAL MG/DL
LEUKOCYTE ESTERASE UR QL STRIP.AUTO: NEGATIVE
LYMPHOCYTES # BLD AUTO: 1.28 X10*3/UL (ref 0.8–3)
LYMPHOCYTES NFR BLD AUTO: 15.9 %
MAGNESIUM SERPL-MCNC: 1.9 MG/DL (ref 1.6–2.4)
MCH RBC QN AUTO: 31 PG (ref 26–34)
MCHC RBC AUTO-ENTMCNC: 33 G/DL (ref 32–36)
MCV RBC AUTO: 94 FL (ref 80–100)
MONOCYTES # BLD AUTO: 0.73 X10*3/UL (ref 0.05–0.8)
MONOCYTES NFR BLD AUTO: 9.1 %
NEUTROPHILS # BLD AUTO: 5.86 X10*3/UL (ref 1.6–5.5)
NEUTROPHILS NFR BLD AUTO: 73.1 %
NITRITE UR QL STRIP.AUTO: NEGATIVE
NRBC BLD-RTO: 0 /100 WBCS (ref 0–0)
P AXIS: 70 DEGREES
P OFFSET: 163 MS
P ONSET: 115 MS
PH UR STRIP.AUTO: 7 [PH]
PLATELET # BLD AUTO: 218 X10*3/UL (ref 150–450)
POTASSIUM SERPL-SCNC: 4.4 MMOL/L (ref 3.5–5.3)
PR INTERVAL: 188 MS
PROT SERPL-MCNC: 6.2 G/DL (ref 6.4–8.2)
PROT UR STRIP.AUTO-MCNC: NEGATIVE MG/DL
PROTHROMBIN TIME: 11.4 SECONDS (ref 9.8–12.4)
Q ONSET: 209 MS
QRS COUNT: 6 BEATS
QRS DURATION: 118 MS
QT INTERVAL: 500 MS
QTC CALCULATION(BAZETT): 392 MS
QTC FREDERICIA: 425 MS
R AXIS: -15 DEGREES
RBC # BLD AUTO: 3.77 X10*6/UL (ref 4–5.2)
RBC # UR STRIP.AUTO: ABNORMAL MG/DL
RBC #/AREA URNS AUTO: NORMAL /HPF
SARS-COV-2 RNA RESP QL NAA+PROBE: NOT DETECTED
SODIUM SERPL-SCNC: 134 MMOL/L (ref 136–145)
SP GR UR STRIP.AUTO: 1.01
T AXIS: 54 DEGREES
T OFFSET: 459 MS
TSH SERPL-ACNC: 2.05 MIU/L (ref 0.44–3.98)
UROBILINOGEN UR STRIP.AUTO-MCNC: NORMAL MG/DL
VENTRICULAR RATE: 37 BPM
WBC # BLD AUTO: 8 X10*3/UL (ref 4.4–11.3)
WBC #/AREA URNS AUTO: NORMAL /HPF

## 2025-03-11 PROCEDURE — 99291 CRITICAL CARE FIRST HOUR: CPT | Performed by: SURGERY

## 2025-03-11 PROCEDURE — 4A023N6 MEASUREMENT OF CARDIAC SAMPLING AND PRESSURE, RIGHT HEART, PERCUTANEOUS APPROACH: ICD-10-PCS | Performed by: INTERNAL MEDICINE

## 2025-03-11 PROCEDURE — 2500000004 HC RX 250 GENERAL PHARMACY W/ HCPCS (ALT 636 FOR OP/ED): Performed by: EMERGENCY MEDICINE

## 2025-03-11 PROCEDURE — 2020000001 HC ICU ROOM DAILY

## 2025-03-11 PROCEDURE — 0JH606Z INSERTION OF PACEMAKER, DUAL CHAMBER INTO CHEST SUBCUTANEOUS TISSUE AND FASCIA, OPEN APPROACH: ICD-10-PCS | Performed by: INTERNAL MEDICINE

## 2025-03-11 PROCEDURE — 33210 INSERT ELECTRD/PM CATH SNGL: CPT | Performed by: INTERNAL MEDICINE

## 2025-03-11 PROCEDURE — 02HK3JZ INSERTION OF PACEMAKER LEAD INTO RIGHT VENTRICLE, PERCUTANEOUS APPROACH: ICD-10-PCS | Performed by: INTERNAL MEDICINE

## 2025-03-11 PROCEDURE — 2500000004 HC RX 250 GENERAL PHARMACY W/ HCPCS (ALT 636 FOR OP/ED): Performed by: NURSE PRACTITIONER

## 2025-03-11 PROCEDURE — 2500000001 HC RX 250 WO HCPCS SELF ADMINISTERED DRUGS (ALT 637 FOR MEDICARE OP): Performed by: EMERGENCY MEDICINE

## 2025-03-11 PROCEDURE — 93005 ELECTROCARDIOGRAM TRACING: CPT

## 2025-03-11 PROCEDURE — 99223 1ST HOSP IP/OBS HIGH 75: CPT | Performed by: STUDENT IN AN ORGANIZED HEALTH CARE EDUCATION/TRAINING PROGRAM

## 2025-03-11 PROCEDURE — 84443 ASSAY THYROID STIM HORMONE: CPT | Performed by: EMERGENCY MEDICINE

## 2025-03-11 PROCEDURE — 36415 COLL VENOUS BLD VENIPUNCTURE: CPT | Performed by: EMERGENCY MEDICINE

## 2025-03-11 PROCEDURE — C1894 INTRO/SHEATH, NON-LASER: HCPCS | Performed by: INTERNAL MEDICINE

## 2025-03-11 PROCEDURE — 5A1223Z PERFORMANCE OF CARDIAC PACING, CONTINUOUS: ICD-10-PCS | Performed by: INTERNAL MEDICINE

## 2025-03-11 PROCEDURE — 2500000005 HC RX 250 GENERAL PHARMACY W/O HCPCS: Performed by: INTERNAL MEDICINE

## 2025-03-11 PROCEDURE — 84484 ASSAY OF TROPONIN QUANT: CPT | Performed by: EMERGENCY MEDICINE

## 2025-03-11 PROCEDURE — 99285 EMERGENCY DEPT VISIT HI MDM: CPT | Performed by: EMERGENCY MEDICINE

## 2025-03-11 PROCEDURE — 85610 PROTHROMBIN TIME: CPT | Performed by: EMERGENCY MEDICINE

## 2025-03-11 PROCEDURE — 83735 ASSAY OF MAGNESIUM: CPT | Performed by: EMERGENCY MEDICINE

## 2025-03-11 PROCEDURE — 87636 SARSCOV2 & INF A&B AMP PRB: CPT | Performed by: EMERGENCY MEDICINE

## 2025-03-11 PROCEDURE — 85025 COMPLETE CBC W/AUTO DIFF WBC: CPT | Performed by: EMERGENCY MEDICINE

## 2025-03-11 PROCEDURE — 71045 X-RAY EXAM CHEST 1 VIEW: CPT | Performed by: RADIOLOGY

## 2025-03-11 PROCEDURE — 71045 X-RAY EXAM CHEST 1 VIEW: CPT

## 2025-03-11 PROCEDURE — 02H63JZ INSERTION OF PACEMAKER LEAD INTO RIGHT ATRIUM, PERCUTANEOUS APPROACH: ICD-10-PCS | Performed by: INTERNAL MEDICINE

## 2025-03-11 PROCEDURE — 3E0102A INTRODUCTION OF ANTI-INFECTIVE ENVELOPE INTO SUBCUTANEOUS TISSUE, OPEN APPROACH: ICD-10-PCS | Performed by: INTERNAL MEDICINE

## 2025-03-11 PROCEDURE — C1756 CATH, PACING, TRANSESOPH: HCPCS | Performed by: INTERNAL MEDICINE

## 2025-03-11 PROCEDURE — 81001 URINALYSIS AUTO W/SCOPE: CPT | Performed by: EMERGENCY MEDICINE

## 2025-03-11 PROCEDURE — 2500000004 HC RX 250 GENERAL PHARMACY W/ HCPCS (ALT 636 FOR OP/ED): Performed by: INTERNAL MEDICINE

## 2025-03-11 PROCEDURE — 80053 COMPREHEN METABOLIC PANEL: CPT | Performed by: EMERGENCY MEDICINE

## 2025-03-11 PROCEDURE — 83880 ASSAY OF NATRIURETIC PEPTIDE: CPT | Performed by: EMERGENCY MEDICINE

## 2025-03-11 PROCEDURE — 99291 CRITICAL CARE FIRST HOUR: CPT | Mod: 25 | Performed by: EMERGENCY MEDICINE

## 2025-03-11 PROCEDURE — 2720000007 HC OR 272 NO HCPCS: Performed by: INTERNAL MEDICINE

## 2025-03-11 DEVICE — FLOW DIRECTED PACING CATHETER
Type: IMPLANTABLE DEVICE | Site: HEART | Status: FUNCTIONAL
Brand: PACEL™

## 2025-03-11 RX ORDER — GUAIFENESIN 600 MG/1
600 TABLET, EXTENDED RELEASE ORAL EVERY 12 HOURS PRN
Status: DISCONTINUED | OUTPATIENT
Start: 2025-03-11 | End: 2025-03-11

## 2025-03-11 RX ORDER — ONDANSETRON HYDROCHLORIDE 2 MG/ML
4 INJECTION, SOLUTION INTRAVENOUS EVERY 8 HOURS PRN
Status: DISCONTINUED | OUTPATIENT
Start: 2025-03-11 | End: 2025-03-14 | Stop reason: HOSPADM

## 2025-03-11 RX ORDER — ENOXAPARIN SODIUM 100 MG/ML
40 INJECTION SUBCUTANEOUS EVERY 24 HOURS
Status: DISCONTINUED | OUTPATIENT
Start: 2025-03-11 | End: 2025-03-11

## 2025-03-11 RX ORDER — LIDOCAINE HYDROCHLORIDE 10 MG/ML
INJECTION, SOLUTION EPIDURAL; INFILTRATION; INTRACAUDAL; PERINEURAL AS NEEDED
Status: DISCONTINUED | OUTPATIENT
Start: 2025-03-11 | End: 2025-03-11 | Stop reason: HOSPADM

## 2025-03-11 RX ORDER — ONDANSETRON 4 MG/1
4 TABLET, FILM COATED ORAL EVERY 8 HOURS PRN
Status: DISCONTINUED | OUTPATIENT
Start: 2025-03-11 | End: 2025-03-14 | Stop reason: HOSPADM

## 2025-03-11 RX ORDER — DOPAMINE HYDROCHLORIDE 160 MG/100ML
5 INJECTION, SOLUTION INTRAVENOUS CONTINUOUS
Status: DISCONTINUED | OUTPATIENT
Start: 2025-03-11 | End: 2025-03-11

## 2025-03-11 RX ORDER — PSYLLIUM HUSK 0.4 G
1 CAPSULE ORAL DAILY
COMMUNITY

## 2025-03-11 RX ORDER — ASPIRIN 81 MG/1
81 TABLET ORAL DAILY
Status: DISCONTINUED | OUTPATIENT
Start: 2025-03-11 | End: 2025-03-14 | Stop reason: HOSPADM

## 2025-03-11 RX ORDER — ACETAMINOPHEN 325 MG/1
975 TABLET ORAL ONCE
Status: COMPLETED | OUTPATIENT
Start: 2025-03-11 | End: 2025-03-11

## 2025-03-11 RX ORDER — ACETAMINOPHEN 325 MG/1
650 TABLET ORAL EVERY 4 HOURS PRN
Status: DISCONTINUED | OUTPATIENT
Start: 2025-03-11 | End: 2025-03-14 | Stop reason: HOSPADM

## 2025-03-11 RX ORDER — PANTOPRAZOLE SODIUM 40 MG/1
40 TABLET, DELAYED RELEASE ORAL
Status: DISCONTINUED | OUTPATIENT
Start: 2025-03-12 | End: 2025-03-11

## 2025-03-11 RX ORDER — ACETAMINOPHEN 650 MG/1
650 SUPPOSITORY RECTAL EVERY 4 HOURS PRN
Status: DISCONTINUED | OUTPATIENT
Start: 2025-03-11 | End: 2025-03-14 | Stop reason: HOSPADM

## 2025-03-11 RX ORDER — ATORVASTATIN CALCIUM 10 MG/1
10 TABLET, FILM COATED ORAL DAILY
Status: DISCONTINUED | OUTPATIENT
Start: 2025-03-11 | End: 2025-03-14 | Stop reason: HOSPADM

## 2025-03-11 RX ORDER — TALC
3 POWDER (GRAM) TOPICAL NIGHTLY PRN
Status: DISCONTINUED | OUTPATIENT
Start: 2025-03-11 | End: 2025-03-14 | Stop reason: HOSPADM

## 2025-03-11 RX ORDER — POLYETHYLENE GLYCOL 3350 17 G/17G
17 POWDER, FOR SOLUTION ORAL DAILY PRN
Status: DISCONTINUED | OUTPATIENT
Start: 2025-03-11 | End: 2025-03-14 | Stop reason: HOSPADM

## 2025-03-11 RX ORDER — ACETAMINOPHEN 160 MG/5ML
650 SOLUTION ORAL EVERY 4 HOURS PRN
Status: DISCONTINUED | OUTPATIENT
Start: 2025-03-11 | End: 2025-03-14 | Stop reason: HOSPADM

## 2025-03-11 RX ORDER — PANTOPRAZOLE SODIUM 40 MG/10ML
40 INJECTION, POWDER, LYOPHILIZED, FOR SOLUTION INTRAVENOUS
Status: DISCONTINUED | OUTPATIENT
Start: 2025-03-12 | End: 2025-03-11

## 2025-03-11 RX ORDER — ENOXAPARIN SODIUM 100 MG/ML
40 INJECTION SUBCUTANEOUS EVERY 24 HOURS
Status: DISCONTINUED | OUTPATIENT
Start: 2025-03-11 | End: 2025-03-14 | Stop reason: HOSPADM

## 2025-03-11 RX ORDER — SODIUM CHLORIDE 9 MG/ML
INJECTION, SOLUTION INTRAVENOUS CONTINUOUS PRN
Status: COMPLETED | OUTPATIENT
Start: 2025-03-11 | End: 2025-03-11

## 2025-03-11 RX ADMIN — DOPAMINE HYDROCHLORIDE IN DEXTROSE 0.5 MCG/KG/MIN: 1.6 INJECTION, SOLUTION INTRAVENOUS at 11:42

## 2025-03-11 RX ADMIN — ACETAMINOPHEN 975 MG: 325 TABLET, FILM COATED ORAL at 13:23

## 2025-03-11 RX ADMIN — ENOXAPARIN SODIUM 40 MG: 40 INJECTION SUBCUTANEOUS at 21:00

## 2025-03-11 RX ADMIN — ISOPROTERENOL HYDROCHLORIDE 0.01 MCG/KG/MIN: 0.2 INJECTION, SOLUTION INTRACARDIAC; INTRAMUSCULAR; INTRAVENOUS; SUBCUTANEOUS at 13:57

## 2025-03-11 SDOH — SOCIAL STABILITY: SOCIAL INSECURITY: HAVE YOU HAD ANY THOUGHTS OF HARMING ANYONE ELSE?: NO

## 2025-03-11 SDOH — SOCIAL STABILITY: SOCIAL INSECURITY: HAS ANYONE EVER THREATENED TO HURT YOUR FAMILY OR YOUR PETS?: NO

## 2025-03-11 SDOH — SOCIAL STABILITY: SOCIAL INSECURITY: DOES ANYONE TRY TO KEEP YOU FROM HAVING/CONTACTING OTHER FRIENDS OR DOING THINGS OUTSIDE YOUR HOME?: NO

## 2025-03-11 SDOH — SOCIAL STABILITY: SOCIAL INSECURITY: WITHIN THE LAST YEAR, HAVE YOU BEEN AFRAID OF YOUR PARTNER OR EX-PARTNER?: NO

## 2025-03-11 SDOH — SOCIAL STABILITY: SOCIAL INSECURITY: DO YOU FEEL UNSAFE GOING BACK TO THE PLACE WHERE YOU ARE LIVING?: NO

## 2025-03-11 SDOH — SOCIAL STABILITY: SOCIAL INSECURITY: DO YOU FEEL ANYONE HAS EXPLOITED OR TAKEN ADVANTAGE OF YOU FINANCIALLY OR OF YOUR PERSONAL PROPERTY?: NO

## 2025-03-11 SDOH — SOCIAL STABILITY: SOCIAL INSECURITY: WERE YOU ABLE TO COMPLETE ALL THE BEHAVIORAL HEALTH SCREENINGS?: YES

## 2025-03-11 SDOH — SOCIAL STABILITY: SOCIAL INSECURITY: ABUSE: ADULT

## 2025-03-11 SDOH — SOCIAL STABILITY: SOCIAL INSECURITY
WITHIN THE LAST YEAR, HAVE YOU BEEN RAPED OR FORCED TO HAVE ANY KIND OF SEXUAL ACTIVITY BY YOUR PARTNER OR EX-PARTNER?: NO

## 2025-03-11 SDOH — SOCIAL STABILITY: SOCIAL INSECURITY: HAVE YOU HAD THOUGHTS OF HARMING ANYONE ELSE?: NO

## 2025-03-11 SDOH — ECONOMIC STABILITY: INCOME INSECURITY: IN THE PAST 12 MONTHS HAS THE ELECTRIC, GAS, OIL, OR WATER COMPANY THREATENED TO SHUT OFF SERVICES IN YOUR HOME?: NO

## 2025-03-11 SDOH — SOCIAL STABILITY: SOCIAL INSECURITY
WITHIN THE LAST YEAR, HAVE YOU BEEN KICKED, HIT, SLAPPED, OR OTHERWISE PHYSICALLY HURT BY YOUR PARTNER OR EX-PARTNER?: NO

## 2025-03-11 SDOH — SOCIAL STABILITY: SOCIAL INSECURITY: WITHIN THE LAST YEAR, HAVE YOU BEEN HUMILIATED OR EMOTIONALLY ABUSED IN OTHER WAYS BY YOUR PARTNER OR EX-PARTNER?: NO

## 2025-03-11 SDOH — SOCIAL STABILITY: SOCIAL INSECURITY: ARE THERE ANY APPARENT SIGNS OF INJURIES/BEHAVIORS THAT COULD BE RELATED TO ABUSE/NEGLECT?: NO

## 2025-03-11 SDOH — SOCIAL STABILITY: SOCIAL INSECURITY: ARE YOU OR HAVE YOU BEEN THREATENED OR ABUSED PHYSICALLY, EMOTIONALLY, OR SEXUALLY BY ANYONE?: NO

## 2025-03-11 ASSESSMENT — COLUMBIA-SUICIDE SEVERITY RATING SCALE - C-SSRS
1. IN THE PAST MONTH, HAVE YOU WISHED YOU WERE DEAD OR WISHED YOU COULD GO TO SLEEP AND NOT WAKE UP?: NO
2. HAVE YOU ACTUALLY HAD ANY THOUGHTS OF KILLING YOURSELF?: NO
6. HAVE YOU EVER DONE ANYTHING, STARTED TO DO ANYTHING, OR PREPARED TO DO ANYTHING TO END YOUR LIFE?: NO

## 2025-03-11 ASSESSMENT — COGNITIVE AND FUNCTIONAL STATUS - GENERAL
MOBILITY SCORE: 24
PATIENT BASELINE BEDBOUND: NO
DAILY ACTIVITIY SCORE: 24

## 2025-03-11 ASSESSMENT — LIFESTYLE VARIABLES
SKIP TO QUESTIONS 9-10: 1
HOW OFTEN DO YOU HAVE 6 OR MORE DRINKS ON ONE OCCASION: NEVER
HOW MANY STANDARD DRINKS CONTAINING ALCOHOL DO YOU HAVE ON A TYPICAL DAY: PATIENT DOES NOT DRINK
HOW OFTEN DO YOU HAVE A DRINK CONTAINING ALCOHOL: NEVER
AUDIT-C TOTAL SCORE: 0
AUDIT-C TOTAL SCORE: 0

## 2025-03-11 ASSESSMENT — ENCOUNTER SYMPTOMS
EYES NEGATIVE: 1
CARDIOVASCULAR NEGATIVE: 1
ALLERGIC/IMMUNOLOGIC NEGATIVE: 1
ENDOCRINE NEGATIVE: 1
CHEST TIGHTNESS: 1
RESPIRATORY NEGATIVE: 1
GASTROINTESTINAL NEGATIVE: 1
PSYCHIATRIC NEGATIVE: 1
MUSCULOSKELETAL NEGATIVE: 1
LIGHT-HEADEDNESS: 1
CONSTITUTIONAL NEGATIVE: 1
HEMATOLOGIC/LYMPHATIC NEGATIVE: 1
FATIGUE: 1
DIZZINESS: 1

## 2025-03-11 ASSESSMENT — ACTIVITIES OF DAILY LIVING (ADL)
TOILETING: INDEPENDENT
LACK_OF_TRANSPORTATION: NO
HEARING - LEFT EAR: FUNCTIONAL
LACK_OF_TRANSPORTATION: NO
HEARING - RIGHT EAR: FUNCTIONAL
DRESSING YOURSELF: INDEPENDENT
BATHING: INDEPENDENT
ASSISTIVE_DEVICE: EYEGLASSES
ADEQUATE_TO_COMPLETE_ADL: YES
FEEDING YOURSELF: INDEPENDENT
PATIENT'S MEMORY ADEQUATE TO SAFELY COMPLETE DAILY ACTIVITIES?: YES
GROOMING: INDEPENDENT
JUDGMENT_ADEQUATE_SAFELY_COMPLETE_DAILY_ACTIVITIES: YES

## 2025-03-11 ASSESSMENT — PAIN - FUNCTIONAL ASSESSMENT
PAIN_FUNCTIONAL_ASSESSMENT: 0-10

## 2025-03-11 ASSESSMENT — PATIENT HEALTH QUESTIONNAIRE - PHQ9
2. FEELING DOWN, DEPRESSED OR HOPELESS: NOT AT ALL
SUM OF ALL RESPONSES TO PHQ9 QUESTIONS 1 & 2: 0
1. LITTLE INTEREST OR PLEASURE IN DOING THINGS: NOT AT ALL

## 2025-03-11 ASSESSMENT — PAIN SCALES - GENERAL: PAINLEVEL_OUTOF10: 0 - NO PAIN

## 2025-03-11 NOTE — H&P
"Medical Intensive Care - History and Physical   Subjective    Abena ELODIA Shaw is a 74 y.o. year old female patient admitted on 3/11/2025 with following ICU needs: tele monitoring for new complete heart block.    HPI:  Patient has recent history of intermittent fatigue and weakness and occasional syncope presenting today with several days of ongoing fatigue, brain fog, weakness and chest \"tightness.\" Found to be in 2nd degree/complete heart block wit hHR in the 30s refractory to dopamine and isoproterenol. She was taken to cath lab for temp TVP and now has HR at 80 with BPs improved from 80s to 110s.    Review of Systems:  Review of Systems   Constitutional:  Positive for fatigue.   HENT: Negative.     Eyes: Negative.    Respiratory:  Positive for chest tightness.    Cardiovascular: Negative.    Gastrointestinal: Negative.    Endocrine: Negative.    Genitourinary: Negative.    Musculoskeletal: Negative.    Allergic/Immunologic: Negative.    Neurological:  Positive for dizziness and syncope.   Hematological: Negative.           Meds    Home medications:  Current Outpatient Medications   Medication Instructions    aspirin 81 mg, Daily    atorvastatin (LIPITOR) 10 mg, oral, Daily    calcium carbonate-vitamin D3 500 mg-5 mcg (200 unit) tablet 1 tablet, Daily    CALCIUM ORAL Calcium TABS   Refills: 0       Active    cholecalciferol (Vitamin D-3) 50 mcg (2,000 unit) capsule Daily RT    cyanocobalamin, vitamin B-12, 2,500 mcg tablet, sublingual Daily RT    metoprolol succinate XL (TOPROL-XL) 50 mg, oral, Daily    pyridoxine (VITAMIN B-6) 100 mg, oral, Daily        Inpatient medications:  Scheduled medications  aspirin, 81 mg, oral, Daily  atorvastatin, 10 mg, oral, Daily  enoxaparin, 40 mg, subcutaneous, q24h  influenza, 0.5 mL, intramuscular, During hospitalization  sodium chloride, 500 mL, intravenous, Once      Continuous medications     PRN medications  PRN medications: acetaminophen **OR** acetaminophen **OR** " "acetaminophen, melatonin, ondansetron **OR** ondansetron, polyethylene glycol     Objective    Blood pressure 112/67, pulse 80, temperature 36.8 °C (98.2 °F), temperature source Temporal, resp. rate 21, height 1.549 m (5' 1\"), weight 45.4 kg (100 lb), SpO2 97%, not currently breastfeeding.     Physical Exam  HENT:      Head: Normocephalic.      Mouth/Throat:      Mouth: Mucous membranes are moist.   Eyes:      Pupils: Pupils are equal, round, and reactive to light.   Cardiovascular:      Rate and Rhythm: Bradycardia present. Rhythm irregular.   Pulmonary:      Effort: Pulmonary effort is normal. No respiratory distress.      Breath sounds: No wheezing.   Abdominal:      Palpations: Abdomen is soft.   Musculoskeletal:         General: No swelling or tenderness.   Skin:     General: Skin is warm and dry.      Capillary Refill: Capillary refill takes less than 2 seconds.   Neurological:      General: No focal deficit present.      Mental Status: She is alert.   Psychiatric:         Mood and Affect: Mood normal.         Thought Content: Thought content normal.            Intake/Output Summary (Last 24 hours) at 3/11/2025 1708  Last data filed at 3/11/2025 1614  Gross per 24 hour   Intake --   Output 5 ml   Net -5 ml     Labs:   Results from last 72 hours   Lab Units 03/11/25  0834   SODIUM mmol/L 134*   POTASSIUM mmol/L 4.4   CHLORIDE mmol/L 102   CO2 mmol/L 26   BUN mg/dL 16   CREATININE mg/dL 0.83   GLUCOSE mg/dL 93   CALCIUM mg/dL 9.2   ANION GAP mmol/L 10   EGFR mL/min/1.73m*2 73      Results from last 72 hours   Lab Units 03/11/25  0834   WBC AUTO x10*3/uL 8.0   HEMOGLOBIN g/dL 11.7*   HEMATOCRIT % 35.5*   PLATELETS AUTO x10*3/uL 218   NEUTROS PCT AUTO % 73.1   LYMPHS PCT AUTO % 15.9   MONOS PCT AUTO % 9.1   EOS PCT AUTO % 1.0            Assessment and Plan     Assessment:  Abena Shaw is a 74 y.o. year old female patient admitted to the MICU on 03/11/25 for complete heart block and need for " TVP.    Plan:  -s/p TVP, capturing at 80BPM, pt needs to be strict bedrest until PPM tomorrow  -ok for diet. NPO after midnight  -hold betablocker  -after return from cath lab we stopped isoproterenol  -DVT prophylaxis  -cardiology following    ICU Check List                   Venous Sheath 03/11/25 1609 5 Fr. Right (Active)   Placement Date/Time: 03/11/25 1609   Sheath Size: 5 Fr.  Line Orientation: Right   Number of days: 0       External Urinary Catheter Female (Active)   Placement Date/Time: 03/11/25 1436   Hand Hygiene Completed: Yes  External Catheter Type: Female   Number of days: 0       Social:  Code: Full Code    HPOA:   Disposition: ICU        Sydnee Juarez MD   03/11/25 at 5:08 PM     Disclaimer: Documentation completed with the information available at the time of input. The times in the chart may not be reflective of actual patient care times, interventions, or procedures. Documentation occurs after the physical care of the patient.

## 2025-03-11 NOTE — PROGRESS NOTES
03/11/25 1007   ACS Disability Status   Are you deaf or do you have serious difficulty hearing? N   Are you blind or do you have serious difficulty seeing, even when wearing glasses? N   Because of a physical, mental, or emotional condition, do you have serious difficulty concentrating, remembering, or making decisions? (5 years old or older) N   Do you have serious difficulty walking or climbing stairs? Y   Do you have serious difficulty dressing or bathing? N   Because of a physical, mental, or emotional condition, do you have serious difficulty doing errands alone such as visiting the doctor? Y

## 2025-03-11 NOTE — PROGRESS NOTES
Pharmacy Medication History     Source of Information: PATIENT    Additional concerns with the patient's PTA list.     The following updates were made to the Prior to Admission medication list:     Medications ADDED:   N/A  Medications CHANGED:  N/A  Medications REMOVED:   N/A  Medications NOT TAKING:   N/A    Allergy reviewed : Yes    Meds 2 Beds : Yes    Outpatient pharmacy confirmed and updated in chart : Yes    Pharmacy name: [facility]    The list below reflectives the updated PTA list. Please review each medication in order reconciliation for additional clarification and justification.    Prior to Admission Medications   Prescriptions Last Dose Informant               aspirin 81 mg EC tablet 3/10/2025 Morning      Sig: Take 1 tablet (81 mg) by mouth once daily.   atorvastatin (Lipitor) 10 mg tablet 3/10/2025 Morning      Sig: Take 1 tablet (10 mg) by mouth once daily.   calcium carbonate-vitamin D3 500 mg-5 mcg (200 unit) tablet 3/10/2025 Morning      Sig: Take 1 tablet by mouth once daily.   cholecalciferol (Vitamin D-3) 50 mcg (2,000 unit) capsule 3/10/2025 Morning      Sig: Take by mouth once daily.   cyanocobalamin, vitamin B-12, 2,500 mcg tablet, sublingual 3/10/2025 Morning      Sig: Place under the tongue once daily.   metoprolol succinate XL (Toprol-XL) 50 mg 24 hr tablet 3/10/2025 Morning      Sig: Take 1 tablet (50 mg) by mouth once daily.   pyridoxine (Vitamin B-6) 100 mg tablet 3/10/2025 Morning      Sig: Take 1 tablet (100 mg) by mouth once daily.      Facility-Administered Medications: None       The list below reflectives the updated allergy list. Please review each documented allergy for additional clarification and justification.    No Known Allergies       03/11/25 at 11:01 AM - Bisi Dial

## 2025-03-11 NOTE — ED PROVIDER NOTES
History/Exam limitations: none.   Additional history was obtained from patient, spouse/SO, and past medical records.          HPI:    Abena Shaw is a 74 y.o. female PMH hyperlipidemia, WPW status post ablation 2014, echo with impaired relaxation presenting for evaluation of dizziness, lightheadedness, generalized fatigue worsening over the last couple months present for evaluation of lightheadedness.  Patient states that she began feeling lightheaded yesterday evening.  She reported is been having frequent episodes of lightheadedness and nearly passing out when she stands from a seated position.  Oftentimes happens as well when she is going upstairs or exerting herself.  Yesterday evening she had an episode she had to steady herself and folic she was in a pass out.  She continued to feel lightheaded through the evening and then this morning did not feel particularly well with continued generalized fatigue and lightheadedness so came in to be seen.  She is having some right shoulder pain chronically that was worse in the evenings that is unchanged with no trauma.  Denies any chest pain or shortness of breath.  No lateralizing weakness numbness or discoordination.  No abdominal pain diarrhea dysuria.  Reports good p.o. intake with lots of p.o. fluids.      Has not taken her medications today, believes her last dose of metoprolol was yesterday morning around 8 to 9 AM.      Physical Exam:  ED Triage Vitals [03/11/25 0744]   Temperature Heart Rate Respirations BP   37 °C (98.6 °F) (!) 40 18 (!) 169/39      Pulse Ox Temp src Heart Rate Source Patient Position   98 % -- -- --      BP Location FiO2 (%)     -- --        GEN:      Alert, mildly fatigued appearing  Eyes:       PERRL, EOMI  HENT:      NC/AT, OP clear, airway patent, MM  CV:      Bradycardic rate, RR, no MRG, no LE pitting edema, 2+ radial and pedal pulses  PULM:     CTAB, no w/r/r, easy WOB, symmetric chest rise  ABD:      Soft, NT, ND, no masses,  BS +  :       No CVA TTP  NEURO:   A/Ox4, CN II-XII intact, strength 5/5 in all 4 ext, SILT, FNF normal b/l, no pronator drift, no nystagmus, negative test of skew, no dysarthria or aphasia, no truncal instability  MSK:      FROM, no joint deformities or swelling, no e/o trauma  SKIN:       Warm and dry  PSYCH:    Appropriate mood and affect         MDM/ED Course:   Abena Shaw is a 74 y.o. female PMH hyperlipidemia, WPW status post ablation 2014, echo with impaired relaxation presenting for evaluation of dizziness, lightheadedness, generalized fatigue worsening over the last couple months present for evaluation of lightheadedness.  Vitals markable for heart rate 40, blood pressure 160s over 30s.  Exam as documented above.  Differential for symptomatic bradycardia given bradycardic heart rates on arrival.  States that she was told she is bradycardic during her recent echocardiogram.  Differential includes other underlying arrhythmia, BPPV or other peripheral dizziness/vertigo (less likely given clear positional component).  Differential also includes but is not limited to posterior vascular insufficiency, orthostatic dizziness, dehydration, electrolyte abnormality, ACS, symptomatic anemia.  EKG with 2: 1 AV block as below.  IV placed and labs drawn.  CBC with no leukocytosis or marked anemia.  Troponin negative less likely ACS.  , 2 months ago was 47, does not appear overtly volume exam.  Has no chest pain pleuritic pain or shortness of breath low suspicion for PE at this time.  Chemistry with sodium 134 from last 139 otherwise reassuring electrolytes.  Magnesium 1.9.  COVID flu negative.  Urinalysis reassuring.  Chest x-ray with no evidence of pneumonia or other acute process per radiology read.  Cardiology was consulted after patient arrival given marked bradycardic heart rates.  Crash cart in room and pads on patient.  Blood pressure stable.  Holding beta-blockers, patient's last dose was  greater than 24 hours ago.  Considered reversal medications however suspect that this is less likely related to her beta-blocker given time since last dose.    ED Course as of 03/14/25 1329   Tue Mar 11, 2025   1140 Initial plan was made with cardiology to continue holding beta-blockers, admit patient to stepdown for further management.  Did discuss with Dr. Mccracken and patient was accepted.  Patient had a couple transient blood pressures of 70s over 50s with no change in mental status.  On my most recent assessment her blood pressure is 120s over 50s.  Heart rate in the mid 30s as previous.  Cardiology recommend starting chronotropic medication, considered giving a dose of atropine however nursing staff was able to pull dopamine immediately to bedside.  Patient is nontoxic-appearing and blood pressure on my assessment reassuring.  Notifying ICU. [JM]      ED Course User Index  [JM] Harinder Judge MD         Diagnoses as of 03/14/25 1329   Symptomatic bradycardia     Patient had transient improvement potentially with dopamine, had some heart rates in the 80s however there is concern that the machine may have just been double counting PVCs.  No change in blood pressure.  At a rate of 10 patient's heart rate is still in the high 30s.  Cardiology return to bedside, plan was made with cardiology and ICU to initiate patient on isoproterenol and transfer patient to ICU.  Interventional cardiology team is aware of patient and likely will have temporary pacer placed today based on clinical picture as permanent pacemaker can be placed tomorrow.  Isoproterenol is being prescribed by pharmacy, plan to start prior to patient going to ICU.  Patient had blood pressures of 112/88 and 130/60 prior to moving to ICU, heart rate last in the mid 40s.  Patient and patient's partner at bedside updated throughout ED course and all questions answered.    EKG reviewed by me: 7:51 AM sinus rhythm, rate 37, 2: 1 secondary AV block, no STEMI,  QTc normal at 392 ms, AV block is new compared to prior EKGs.    Chronic medical conditions affecting care listed in MDM. I independently reviewed imaging studies and agreed with radiology reads. I reviewed recent and relevant outside records including PCP notes, Prior discharge summaries, and prior radiology reports.    Procedure  Critical Care    Performed by: Harinder Judge MD  Authorized by: Harinder Judge MD    Critical care provider statement:     Critical care time (minutes):  60    Critical care time was exclusive of:  Separately billable procedures and treating other patients    Critical care was necessary to treat or prevent imminent or life-threatening deterioration of the following conditions:  Cardiac failure    Critical care was time spent personally by me on the following activities:  Development of treatment plan with patient or surrogate, discussions with consultants, evaluation of patient's response to treatment, examination of patient, obtaining history from patient or surrogate, ordering and performing treatments and interventions, ordering and review of laboratory studies, ordering and review of radiographic studies, pulse oximetry, re-evaluation of patient's condition and review of old charts    Care discussed with: admitting provider        Diagnosis:   1.  Symptomatic bradycardia  2.  Second-degree AV block    Dispo: ICU in stable condition      Disclaimer: Portions of this note were dictated by speech recognition. An attempt at proof reading was made to minimize errors. Minor errors in transcription may be present.  Please call if questions.     Harinder Judge MD  03/14/25 9168

## 2025-03-11 NOTE — CONSULTS
Inpatient consult to Cardiology  Consult performed by: Gutierrez BYRD MD  Consult ordered by: Harinder Judge MD  Reason for consult: Bradycardia, Second degree AV block        History Of Present Illness:    Abena Shaw is a 74 y.o. female with past medical history significant for dyslipidemia, prior smoking history, fatigue, esophagitis, memory loss, osteopenia, vitamin D deficiency, history of Renea-Parkinson-White status post ablation in 2014.  Presented with lightheadedness.  Cardiology is consulted for bradycardia, second-degree AV block.    Patient reports that she has been struggling with intermittent episodes of dizziness and ightheadedness.  This has been ongoing for several weeks.  She was seen by outpatient cardiologist Dr. Willard 2/2025.  At that time she is complaining of similar symptoms.  Holter monitor was ordered which she is currently still wearing.  Per sister-in-law who is at the bedside, patient has also been experiencing increased forgetfulness.  Patient admitted she had a syncopal event last week when walking in the kitchen.  She does not recall details.  Given ongoing dizziness she presented to the emergency room for further evaluation.    ECG shows sinus bradycardia 2:1 AV block heart rate 32. Chest x-ray showed no acute cardiopulmonary process. High-sensitivity troponin negative x 1  TSH was 2.05  4K4.4 BUN 16 creatinine 0.83 ALT 45 AST 39 alk phos 44 magnesium 1.9. Initial vital signs temp 37 heart rate 40 RR 1818 /39 pulse ox 98%    Of note, patient was seen by outpatient cardiologist Dr. Willard. Most recent cardiac testing showed normal LV systolic function EF 59% with impaired relaxation on echo performed 3/7/2025, no clear ischemia nuclear stress test performed 5/1/2023, no obstructive carotid disease on Dopplers performed 5/25/2023    Home CV meds  Aspirin  81 mg daily  Atorvastatin 10 mg daily  Metoprolol succinate 50 mg daily      Last Recorded  "Vitals:  Vitals:    03/11/25 0744   BP: (!) 169/39   Pulse: (!) 40   Resp: 18   Temp: 37 °C (98.6 °F)   SpO2: 98%   Weight: 45.4 kg (100 lb)   Height: 1.549 m (5' 1\")       Last Labs:  LABS:  CMP:  Results from last 7 days   Lab Units 03/11/25  0834   SODIUM mmol/L 134*   POTASSIUM mmol/L 4.4   CHLORIDE mmol/L 102   CO2 mmol/L 26   ANION GAP mmol/L 10   BUN mg/dL 16   CREATININE mg/dL 0.83   EGFR mL/min/1.73m*2 73   MAGNESIUM mg/dL 1.90   ALBUMIN g/dL 3.8   ALT U/L 45   AST U/L 39   BILIRUBIN TOTAL mg/dL 0.8     CBC:  Results from last 7 days   Lab Units 03/11/25  0834   WBC AUTO x10*3/uL 8.0   HEMOGLOBIN g/dL 11.7*   HEMATOCRIT % 35.5*   PLATELETS AUTO x10*3/uL 218   MCV fL 94     COAG:   Results from last 7 days   Lab Units 03/11/25  0834   INR  1.0     ABO: No results found for: \"ABO\"  HEME/ENDO:  Results from last 7 days   Lab Units 03/11/25  0834   TSH mIU/L 2.05      CARDIAC:   Results from last 7 days   Lab Units 03/11/25  0834   TROPHS ng/L 8   BNP pg/mL 233*     Recent Labs     05/13/24  0916 08/07/23  1745 06/20/22  1433 06/24/21  0000   CHOL 182 190 186 197   LDLF  --  101* 87 109*   LDLCALC 97  --   --   --    HDL 71.4 72.7 85.8 72.5   TRIG 68 82 64 79        Imagine Results  XR chest 1 view   Final Result   No acute cardiopulmonary process.        MACRO:   None        Signed by: Ashtyn Brooks 3/11/2025 8:47 AM   Dictation workstation:   [id number]         Last I/O:  No intake/output data recorded.    Past Cardiology Tests (Last 3 Years):  EKG:  ECG 12 lead 03/11/2025       Echo:  Transthoracic Echo (TTE) Complete 03/07/2025   1. Left ventricular ejection fraction is normal, calculated by [employer] biplane at 59%.   2. Spectral Doppler shows a Grade I (impaired relaxation pattern) of left ventricular diastolic filling with normal left atrial filling pressure.   3. The left atrium is mildly dilated.   4. The inferior vena cava appears normal in size, with IVC inspiratory collapse less than " 50%.    4/21/2023  1. Left ventricular systolic function is normal with a 60-65% estimated ejection fraction.  2. Spectral Doppler shows an impaired relaxation pattern of left ventricular diastolic filling.    7/28/2021   1. The left ventricular systolic function is normal with a 65-70% estimated ejection fraction.   2. RVSP within normal limits    Ejection Fractions:  EF   Date/Time Value Ref Range Status   03/07/2025 02:49 PM 59 %      Cath:  No results found for this or any previous visit from the past 1095 days.    Stress Test:  Nuclear Stress Test 05/01/2023  Negative myocardial perfusion study without evidence of inducible ischemia or prior infarction.  The left ventricle is normal in size. Normal LV wall motion with a post-stress LV EF estimated at greater  than 65%.    Cardiac Imaging:  No results found for this or any previous visit from the past 1095 days.      Past Medical History:  She has a past medical history of Anxiety, Esophagitis, H/O paroxysmal supraventricular tachycardia (06/21/2024), Hyperlipidemia, Other specified noninflammatory disorders of vulva and perineum (04/17/2017), Prediabetes, Reactive airway disease ([facility]), and WPW (Renea-Parkinson-White syndrome).    Past Surgical History:  She has a past surgical history that includes Cholecystectomy (10/15/2015); Cardiac electrophysiology study and ablation; and Breast surgery.      Social History:  She reports that she quit smoking about 6 years ago. Her smoking use included cigarettes. She started smoking about 57 years ago. She has a 63.8 pack-year smoking history. She has been exposed to tobacco smoke. She has never used smokeless tobacco. She reports that she does not drink alcohol and does not use drugs.    Family History:  Family History   Problem Relation Name Age of Onset    Breast cancer Mother          Allergies:  Patient has no known allergies.    Inpatient Medications:        Outpatient Medications:  Current Outpatient  Medications   Medication Instructions    aspirin 81 mg, Daily    atorvastatin (LIPITOR) 10 mg, oral, Daily    CALCIUM ORAL Calcium TABS   Refills: 0       Active    cholecalciferol (Vitamin D-3) 50 mcg (2,000 unit) capsule Daily RT    cyanocobalamin, vitamin B-12, 2,500 mcg tablet, sublingual Daily RT    metoprolol succinate XL (TOPROL-XL) 50 mg, oral, Daily    pyridoxine (VITAMIN B-6) 100 mg, oral, Daily       Physical Exam:  GENERAL: alert, cooperative, pleasant, in no acute distress  SKIN: warm, dry  NECK: no JVD  CARDIAC: Bradycardia no murmurs  PULMONARY: Normal respiratory efforts, lungs clear to auscultation bilaterally.  ABDOMEN: soft, nondistended  EXTREMITIES: no lower extremity edema  NEURO: Alert and oriented x 3.  Grossly normal.  Moves all 4 extremities.     I reviewed EKGs, labs and all imaging reports  I reviewed telemetry which showed high-grade AV block 2-1 heart rate 30s     Assessment/Plan   Abenadavid Shaw is a 74 y.o. female with past medical history significant for dyslipidemia, prior smoking history, fatigue, esophagitis, memory loss, osteopenia, vitamin D deficiency, history of Renea-Parkinson-White status post ablation in 2014.  Presented with lightheadedness.  Cardiology is consulted for bradycardia, second-degree AV block.    Home CV meds: Aspirin  81 mg daily Atorvastatin 10 mg daily Metoprolol succinate 50 mg daily    #High-grade AV block  Patient presented with intermittent episodes of dizziness and lightheadedness.  ECG showed 2 :1 AV block heart rate 30s.  Blood pressure acceptable at this time.  Reports last dose of metoprolol was yesterday morning around 9 AM.  TSH is normal.  Recent echocardiogram with normal LV systolic function.    #Dyslipidemia  Continue outpatient statin    #History of Renea-Parkinson-White status post ablation    Recommendations  Recommend hospital admission to stepdown. Keep pacer pads at the bedside.   Continue to monitor on telemetry  Continue to  hold any AV julia blockers  EP has been consulted-Dr. Salvador and EP are unavailable at [facility] today.  To see patient tomorrow. If conduction does not recover, will plan for PPM implant   Patient was started on dopamine in the emergency room with minimal improvement in ongoing symptoms.    Case was reviewed and discussed with Dr. Gillombardo of interventional cardiology who placed a temporary wire.  Keep NPO after midnight   Agree with careful monitoring in the ICU.      Shannan Molina, APRN-CNP      Thank you for allowing me to participate in their care.  Please feel free to call me with any further questions or concerns.    Gutierrez Willard MD, FACC, HUMA, [facility]  Division of Cardiovascular Medicine  System Director, Nuclear Cardiology   Medical Director, [facility] [place], [facility]   Clinical , [employer]  [email or web address]   Office:  [phone]          Both the ISRA and I have had a face to face encounter with the patient today. I have examined the patient and edited the documented physical examination as necessary.  I personally reviewed the patient's vital signs, telemetry, recent labs, medications, orders, EKGs, and pertinent cardiac imaging/ echocardiography.  I have reviewed the ISRA's encounter note, approve the ISRA's documentation and have edited the note to reflect my diagnostic and therapeutic plan.

## 2025-03-11 NOTE — Clinical Note
Temporary pacemaker inserted tested. Temporary pacemaker location through right internal jugular vein. Heart rate: 141. 0.3 lowest capture

## 2025-03-11 NOTE — ED TRIAGE NOTES
Patient presents to ed with dizziness, lightheadedness and weakness that has been going on for several months, patient is currently wearing a holter monitor. Patient states it has gotten worse since echo, patient has wpw syndrome. Patient denies any other significant history.

## 2025-03-11 NOTE — POST-PROCEDURE NOTE
Physician Transition of Care Summary  Invasive Cardiovascular Lab    Procedure Date: 3/11/2025  Attending:    * Carl B Gillombardo - Primary  Resident/Fellow/Other Assistant: Surgeons and Role:  * No surgeons found with a matching role *    Indications:   Pre-op Diagnosis      * Symptomatic bradycardia [R00.1]    Post-procedure diagnosis:   Post-op Diagnosis     * Symptomatic bradycardia [R00.1]    Procedure(s):   Temporary Pacemaker Insertion  91792 - [place] INSJ/RPLCMT TEMP TRANSVNS 1CHMBR ELTRD/PM CATH        Description of the Procedure and Procedure Findings:   RIJ approach TVP, sutured in placed at 35cm.   Rate set at 80, output set at 10 mA.        Complications:   none    Stents/Implants:   Implants       Pacemaker    Catheter, Pacing, Pacel, Flow Directed, 5 Fr X 110 Cm - [id number] - Implanted        Inventory item: CATHETER, PACING, PACEL, FLOW DIRECTED, 5 FR X 110 CM Model/Cat number: [id number]    : [employer] Lot number: [id number]    Device identifier: [id number] Implant Date: 3/11/2025      GUDID Information       Request status Successful        Brand name: Pacel™ Version/Model: [id number]    Company name: [employer] MRI safety info as of 3/11/25: Labeling does not contain MRI Safety Information    Contains dry or latex rubber: Yes      GMDN P.T. name: Temporary cardiac pacing balloon catheter                As of 3/11/2025       Status: Implanted                      Catheter, Pacing, Pacel, Flow Directed, 5 Fr X 110 Cm - [id number] - Implanted        Inventory item: CATHETER, PACING, PACEL, FLOW DIRECTED, 5 FR X 110 CM Model/Cat number: [id number]    : [employer] Lot number: [id number]    Device identifier: [id number] Implant Date: 3/11/2025      GUDID Information       Request status Successful        Brand name: Pacel™ Version/Model: [id number]    Company name: [employer] MRI safety info as of 3/11/25: Labeling does  not contain MRI Safety Information    Contains dry or latex rubber: Yes      GMDN P.T. name: Temporary cardiac pacing balloon catheter                As of 3/11/2025       Status: Implanted                      Catheter, Pacing, Pacel, Flow Directed, 5 Fr X 110 Cm - [id number] - Implanted        Inventory item: CATHETER, PACING, PACEL, FLOW DIRECTED, 5 FR X 110 CM Model/Cat number: [id number]    : [employer] Lot number: [id number]    Device identifier: [id number] Implant Date: 3/11/2025      GUDID Information       Request status Successful        Brand name: Pacel™ Version/Model: [id number]    Company name: [employer] MRI safety info as of 3/11/25: Labeling does not contain MRI Safety Information    Contains dry or latex rubber: Yes      GMDN P.T. name: Temporary cardiac pacing balloon catheter                As of 3/11/2025       Status: Implanted                              Anticoagulation/Antiplatelet Plan:   N/a    Estimated Blood Loss:   5 mL    Anesthesia: Moderate Sedation Anesthesia Staff: No anesthesia staff entered.    Any Specimen(s) Removed:   No specimens collected during this procedure.    Disposition:   MICU      Electronically signed by: Maryan Hsieh MD, 3/11/2025 4:17 PM

## 2025-03-11 NOTE — Clinical Note
Temporary pacemaker repositioned. Temporary pacemaker location through right internal jugular vein. Heart rate: 80. Current 10 (mA). Centimeters 35.

## 2025-03-11 NOTE — PROGRESS NOTES
Transitional Care Coordination Progress Note:  Plan per Medical/Surgical team: treatment of dizziness, lightheadedness and weakness, bradycardia, heart block, WPW syndrome with cardio, electrophysiology consults & dopamine gtt   Status: ED  Payor source: Willy javier  Discharge disposition: Home with    Potential Barriers: HR 38, /95  ADOD: 3/13/2025   PATI Moreno RN, BSN Transitional Care Coordinator ED# [phone]      03/11/25 1007   Discharge Planning   Living Arrangements Spouse/significant other   Support Systems Spouse/significant other   Assistance Needed cardio consult, halter monitor in place   Type of Residence Private residence   Number of Stairs to Enter Residence 4   Number of Stairs Within Residence 12   Home or Post Acute Services None   Expected Discharge Disposition Home   Does the patient need discharge transport arranged? No   Financial Resource Strain   How hard is it for you to pay for the very basics like food, housing, medical care, and heating? Not hard   Housing Stability   In the last 12 months, was there a time when you were not able to pay the mortgage or rent on time? N   In the past 12 months, how many times have you moved where you were living? 1   At any time in the past 12 months, were you homeless or living in a shelter (including now)? N   Transportation Needs   In the past 12 months, has lack of transportation kept you from medical appointments or from getting medications? no   In the past 12 months, has lack of transportation kept you from meetings, work, or from getting things needed for daily living? No   Stroke Family Assessment   Stroke Family Assessment Needed No   Intensity of Service   Intensity of Service 0-30 min

## 2025-03-11 NOTE — H&P
History Of Present Illness  Abena Shaw is a 74 y.o. female presenting with symptomatic bradycardia here for transvenous pacemaker insertion with Dr. Gillombardo. PMHx significant for dyslipidemia, prior smoking history, fatigue, esophagitis, memory loss, osteopenia, vitamin D deficiency, history of Renea-Parkinson-White status post ablation in . She presented to [facility] ED with c/o LH/dizziness. In ED, ECG showed 2:1 AV block heart rate 32. Chest x-ray showed no acute cardiopulmonary process. High-sensitivity troponin negative x 1  TSH was 2.05  4K4.4 BUN 16 creatinine 0.83 ALT 45 AST 39 alk phos 44 magnesium 1.9. Initial vital signs temp 37 heart rate 40 RR 1818 /39 pulse ox 98%. Her last dose of Metoprolol was yesterday AM ~8-9AM. She was initially placed on Dopamine gtt with minimal response in her symptoms. She was started on Isoproterenol gtt but remained symptomatic with HR 30s.     Past Medical History:  Past Medical History:   Diagnosis Date    Anxiety     Esophagitis     H/O paroxysmal supraventricular tachycardia 2024    Hyperlipidemia     Other specified noninflammatory disorders of vulva and perineum 2017    Vulvar lesion    Prediabetes     Reactive airway disease ([facility])     WPW (Renea-Parkinson-White syndrome)     s/p ablation         Past Surgical History:  Past Surgical History:   Procedure Laterality Date    BREAST SURGERY      augmentation    CARDIAC ELECTROPHYSIOLOGY STUDY AND ABLATION      CHOLECYSTECTOMY  10/15/2015    Cholecystectomy Laparoscopic          Social History:  Social History     Tobacco Use    Smoking status: Former     Current packs/day: 0.00     Average packs/day: 1.3 packs/day for 51.0 years (63.8 ttl pk-yrs)     Types: Cigarettes     Start date:      Quit date: 2019     Years since quittin.1     Passive exposure: Past    Smokeless tobacco: Never   Vaping Use    Vaping status: Every Day    Substances: Nicotine    Substance Use Topics    Alcohol use: Never    Drug use: Never       Family History:  Family History   Problem Relation Name Age of Onset    Breast cancer Mother          Allergies:  No Known Allergies     Home Medications:  Current Outpatient Medications   Medication Instructions    aspirin 81 mg, Daily    atorvastatin (LIPITOR) 10 mg, oral, Daily    calcium carbonate-vitamin D3 500 mg-5 mcg (200 unit) tablet 1 tablet, Daily    CALCIUM ORAL Calcium TABS   Refills: 0       Active    cholecalciferol (Vitamin D-3) 50 mcg (2,000 unit) capsule Daily RT    cyanocobalamin, vitamin B-12, 2,500 mcg tablet, sublingual Daily RT    metoprolol succinate XL (TOPROL-XL) 50 mg, oral, Daily    pyridoxine (VITAMIN B-6) 100 mg, oral, Daily       Inpatient Medications:  Scheduled medications   Medication Dose Route Frequency    aspirin  81 mg oral Daily    atorvastatin  10 mg oral Daily    enoxaparin  40 mg subcutaneous q24h    influenza  0.5 mL intramuscular During hospitalization    [START ON 3/12/2025] pantoprazole  40 mg oral Daily before breakfast    Or    [START ON 3/12/2025] pantoprazole  40 mg intravenous Daily before breakfast    [Transfer Hold] sodium chloride  500 mL intravenous Once     PRN medications   Medication    acetaminophen    Or    acetaminophen    Or    acetaminophen    guaiFENesin    melatonin    ondansetron    Or    ondansetron    polyethylene glycol     Continuous Medications   Medication Dose Last Rate    isoproterenol  0.01-0.2 mcg/kg/min 0.01 mcg/kg/min (03/11/25 1420)         Review of Systems   Constitutional: Negative.    HENT: Negative.     Eyes: Negative.    Respiratory: Negative.     Cardiovascular: Negative.    Gastrointestinal: Negative.    Endocrine: Negative.    Genitourinary: Negative.    Musculoskeletal: Negative.    Skin: Negative.    Allergic/Immunologic: Negative.    Neurological:  Positive for dizziness and light-headedness.   Hematological: Negative.    Psychiatric/Behavioral: Negative.    "         Physical Exam  General:  Patient is awake, alert, and oriented.  Patient is in no acute distress.  HEENT:  Pupils equal and reactive.  Normocephalic.  Moist mucosa.    Neck:  No JVD.   Cardiovascular:  Bradycardic. No murmurs. Radial pulses 2+.   Pulmonary:  Clear to auscultation bilaterally.  Abdomen:  Soft. Non-tender.   Non-distended.  Positive bowel sounds.  Lower Extremities:  Pedal pulses 2+ No LE edema.  Neurologic:  Cranial nerves II-XII grossly intact.   No focal deficit.   Skin: Skin warm and dry, no lesions. Normal skin turgor.   Psychiatric: Normal affect.     Sedation Plan    ASA 3     Mallampati class: III.    Risks, benefits, and alternatives discussed with patient.         NPO since 0000    Last Recorded Vitals  Blood pressure (!) 89/43, pulse (!) 130, temperature 36.8 °C (98.2 °F), resp. rate (!) 27, height 1.549 m (5' 1\"), weight 45.4 kg (100 lb), SpO2 98%, not currently breastfeeding.         Vitals from the Past 24 Hours  Heart Rate:  []   Temp:  [36.8 °C (98.2 °F)-37.1 °C (98.8 °F)]   Resp:  [12-27]   BP: ()/()   Height:  [154.9 cm (5' 1\")]   Weight:  [45.4 kg (100 lb)]   SpO2:  [95 %-100 %]          Relevant Results    Labs    POCT Glucose:      POCT Urine Pregnancy:       CBC:   Recent Labs     03/11/25  0834 01/10/25  1005 12/05/24  1416 06/14/24  1309 05/13/24  0916 08/07/23  1745   WBC 8.0 7.6 6.7 9.2 8.8 8.0   HGB 11.7* 10.9* 14.0 13.6 13.9 12.8   HCT 35.5* 34.2* 43.3 42.4 46.4* 41.2    163 322 321 298 308   MCV 94 96 95 95 100 98     BMP/CMP:   Recent Labs     03/11/25  0834 01/10/25  1005 12/05/24  1416 06/14/24  1309 05/13/24  0916 08/07/23  1745 06/20/22  1433   * 139 138 138 141 141 143   K 4.4 3.5 5.0 4.8 4.1 4.1 4.2    106 103 99 105 104 105   BUN 16 16 15 14 17 13 11   CREATININE 0.83 0.81 0.83 0.87 0.95 0.81 0.90   CO2 26 26 30 29 28 29 28   CALCIUM 9.2 8.4* 10.0 10.3 9.4 10.0 9.8   PROT 6.2* 6.8 7.5  --  7.2 7.4 7.2   BILITOT 0.8 0.6 " "1.0  --  0.5 0.6 0.8   ALKPHOS 44 40 47  --  43 46 45   ALT 45 22 14  --  9 12 9   AST 39 29 22  --  15 20 13   GLUCOSE 93 96 96 81 113* 95 93      Magnesium:   Recent Labs     03/11/25  0834   MG 1.90     Lipid Panel:   Recent Labs     05/13/24  0916 08/07/23  1745 06/20/22  1433 06/24/21  0000 06/01/20  0000 04/11/19  0508 04/11/18  1252   CHOL 182 190 186 197 204* 214* 219*   HDL 71.4 72.7 85.8 72.5 75.4 69.6 73.3   CHHDL 2.5 2.6 2.2 2.7 2.7 3.1 3.0   VLDL 14 16 13 16 15 15 19   TRIG 68 82 64 79 76 75 97   NHDL 111  --   --   --   --   --   --      Cardiac   Results from last 7 days   Lab Units 03/11/25  0834   TROPHS ng/L 8   BNP pg/mL 233*      Hemoglobin A1C:   Recent Labs     12/05/24  1341   HGBA1C 5.8     TSH/ Free T4:   Recent Labs     03/11/25  0834 12/05/24  1416 05/13/24  0916 08/07/23  1745 06/20/22  1433 06/24/21  0000 06/01/20  0000 04/11/19  0508 04/11/18  1252   TSH 2.05 2.21 2.18 1.70 3.38 2.93 3.10 2.73 2.81     Iron:   Recent Labs     03/11/25  0834 01/10/25  1005   * 47     Coag:   Results from last 7 days   Lab Units 03/11/25  0834   INR  1.0     ABO: No results found for: \"ABO\"    Past Cardiology Tests (Last 3 Years):    EKG:  Recent Labs     03/11/25  0835 01/10/25  1037 08/04/15  1608   ATRRATE 74 91 72   VENTRATE 37 91 72   PRINT 188 158 148   QRSDUR 118 122 116   QTCFRED 425 432 457   QTCCALCB 392 462 470     Encounter Date: 03/11/25   ECG 12 lead   Result Value    Ventricular Rate 37    Atrial Rate 74    AL Interval 188    QRS Duration 118    QT Interval 500    QTC Calculation(Bazett) 392    P Axis 70    R Axis -15    T Axis 54    QRS Count 6    Q Onset 209    P Onset 115    P Offset 163    T Offset 459    QTC Fredericia 425    Narrative    Sinus rhythm with 2nd degree AV block with 2:1 AV conduction  Possible Left atrial enlargement  RSR' or QR pattern in V1 suggests right ventricular conduction delay  Anterolateral infarct (cited on or before 04-AUG-2015)  Abnormal ECG  When " compared with ECG of 10-DORIAN-2025 09:22,  Significant changes have occurred  See ED provider note for full interpretation and clinical correlation  Confirmed by Yudi Hamilton ([id number]) on 3/11/2025 12:30:05 PM     Echo:  Echocardiogram:   Transthoracic Echo (TTE) Complete 03/07/2025    [facility], [address] [place] [address]  Tel [phone] and Fax [phone]    TRANSTHORACIC ECHOCARDIOGRAM REPORT      Patient Name:       TUNDE ELODIA         Reading Physician:    [id number] Aleman MD  Study Date:         3/7/2025            Ordering Provider:    [id number] ALEMAN  MRN/PID:            [id number]            Fellow:  Accession#:         [id number]        Nurse:  Date of Birth/Age:  1950 / 74      Sonographer:          Elena Szymanski RCS  years  Gender assigned at  F                   Additional Staff:  Birth:  Height:             154.94 cm           Admit Date:  Weight:             45.36 kg            Admission Status:     Outpatient  BSA / BMI:          1.41 m2 / 18.89     Encounter#:           [id number]  kg/m2  Blood Pressure:     122/64 mmHg         Department Location:  [facility]    Study Type:    TRANSTHORACIC ECHO (TTE) COMPLETE  Diagnosis/ICD: Pre excitation syndrome-I45.6  Indication:    WPW, [place]  CPT Code:      Echo Complete w Full Doppler-[id number]    Patient History:  Pertinent History: Dyspnea. Dyslipidemia, Renea-Parkinson-White syndrome;  Supraventricular tachycardia.    Study Detail: The following Echo studies were performed: 2D, M-Mode, Doppler and  color flow. Technically challenging study due to prominent lung  artifact.      PHYSICIAN INTERPRETATION:  Left Ventricle: Left ventricular ejection fraction is normal, calculated by Marshall's biplane at 59%. There are no regional left ventricular wall motion abnormalities. The left ventricular cavity size is normal. There is mildly increased septal  and normal posterior left ventricular wall thickness. There is left ventricular concentric remodeling. Spectral Doppler shows a Grade I (impaired relaxation pattern) of left ventricular diastolic filling with normal left atrial filling pressure.  Left Atrium: The left atrium is mildly dilated.  Right Ventricle: The right ventricle is normal in size. There is normal right ventricular global systolic function.  Right Atrium: The right atrium is normal in size.  Aortic Valve: The aortic valve is trileaflet. There is no evidence of aortic valve regurgitation. The peak instantaneous gradient of the aortic valve is 6 mmHg.  Mitral Valve: The mitral valve is mildly thickened. There is mild mitral valve regurgitation.  Tricuspid Valve: The tricuspid valve is structurally normal. There is mild tricuspid regurgitation.  Pulmonic Valve: The pulmonic valve is structurally normal. There is no indication of pulmonic valve regurgitation.  Pericardium: There is no pericardial effusion noted.  Aorta: The aortic root is normal. The Ao Sinus is 3.50 cm. The Asc Ao is 3.80 cm.  Pulmonary Artery: The tricuspid regurgitant velocity is 2.88 m/s, and with an estimated right atrial pressure of 3 mmHg, the estimated pulmonary artery pressure is mildly elevated with the RVSP at 36.1 mmHg.  Systemic Veins: The inferior vena cava appears normal in size, with IVC inspiratory collapse less than 50%.  In comparison to the previous echocardiogram(s): Compared with study dated 4/21/2023, no significant change.      CONCLUSIONS:  1. Left ventricular ejection fraction is normal, calculated by Marshall's biplane at 59%.  2. Spectral Doppler shows a Grade I (impaired relaxation pattern) of left ventricular diastolic filling with normal left atrial filling pressure.  3. The left atrium is mildly dilated.  4. The inferior vena cava appears normal in size, with IVC inspiratory collapse less than 50%.    QUANTITATIVE DATA SUMMARY:    2D MEASUREMENTS:           Normal Ranges:  LAs:             2.63 cm  (2.7-4.0cm)  RVIDd:           1.96 cm  (0.9-3.6cm)  IVSd:            1.14 cm  (0.6-1.1cm)  LVPWd:           0.78 cm  (0.6-1.1cm)  LVIDd:           3.45 cm  (3.9-5.9cm)  LVIDs:           2.39 cm  LV Mass Index:   68 g/m2  LVEDV Index:     40 ml/m2  LV % FS          30.7 %      LEFT ATRIUM:                  Normal Ranges:  LA Vol A4C:        36.3 ml    (22+/-6mL/m2)  LA Vol A2C:        41.5 ml  LA Vol BP:         40.5 ml  LA Vol Index A4C:  25.8ml/m2  LA Vol Index A2C:  29.5 ml/m2  LA Vol Index BP:   28.8 ml/m2  LA Area A4C:       13.7 cm2  LA Area A2C:       15.3 cm2  LA Major Axis A4C: 4.4 cm  LA Major Axis A2C: 4.8 cm  LA Volume Index:   28.8 ml/m2  LA Vol A4C:        33.9 ml      RIGHT ATRIUM:                 Normal Ranges:  RA Vol A4C:        24.0 ml    (8.3-19.5ml)  RA Vol Index A4C:  17.1 ml/m2  RA Area A4C:       10.9 cm2  RA Major Axis A4C: 4.2 cm      AORTA MEASUREMENTS:         Normal Ranges:  Ao Sinus, d:        3.50 cm (2.1-3.5cm)  Asc Ao, d:          3.80 cm (2.1-3.4cm)      LV SYSTOLIC FUNCTION:  Normal Ranges:  EF-A4C View:    59 % (>=55%)  EF-A2C View:    60 %  EF-Biplane:     59 %  LV EF Reported: 59 %      LV DIASTOLIC FUNCTION:             Normal Ranges:  MV Peak E:             1.13 m/s    (0.7-1.2 m/s)  MV Peak A:             0.53 m/s    (0.42-0.7 m/s)  E/A Ratio:             2.12        (1.0-2.2)  MV A Dur:              171.27 msec  PulmV Sys Jerome:         48.35 cm/s  PulmV Salas Jerome:        60.71 cm/s  PulmV S/D Jerome:         0.80  PulmV A Revs Jerome:      25.44 cm/s  PulmV A Revs Dur:      117.98 msec      MITRAL VALVE:          Normal Ranges:  MV DT:        231 msec (150-240msec)      MITRAL INSUFFICIENCY:            Normal Ranges:  PISA Radius:          0.4 cm  MR Alias Jerome:         92.1 cm/s  MR Flow Rt:           80.53 ml/s      AORTIC VALVE:           Normal Ranges:  AoV Vmax:      1.20 m/s (<=1.7m/s)  AoV Peak P.8 mmHg (<20mmHg)  LVOT Max Jerome:   1.09 m/s (<=1.1m/s)  LVOT VTI:      27.26 cm  LVOT Diameter: 2.12 cm  (1.8-2.4cm)  AoV Area,Vmax: 3.18 cm2 (2.5-4.5cm2)      RIGHT VENTRICLE:  RV Basal 3.10 cm  RV Mid   2.00 cm  RV Major 5.6 cm  TAPSE:   22.0 mm  RV s'    0.13 m/s      TRICUSPID VALVE/RVSP:          Normal Ranges:  Peak TR Velocity:     2.88 m/s  RV Syst Pressure:     36 mmHg  (< 30mmHg)  IVC Diam:             2.10 cm      PULMONIC VALVE:          Normal Ranges:  PV Accel Time:  88 msec  (>120ms)  PV Max Jerome:     0.7 m/s  (0.6-0.9m/s)  PV Max P.2 mmHg      PULMONARY VEINS:  PulmV A Revs Dur: 117.98 msec  PulmV A Revs Jerome: 25.44 cm/s  PulmV Salas Jerome:   60.71 cm/s  PulmV S/D Jerome:    0.80  PulmV Sys Jerome:    48.35 cm/s      AORTA:  Asc Ao Diam 3.75 cm      [id number] Gutierrez Willard MD  Electronically signed on 3/7/2025 at 6:01:16 PM        ** Final **    Ejection Fractions:  LV EF   Date/Time Value Ref Range Status   2025 02:49 PM 59 %      Cath:  Coronary Angiography: No results found for this or any previous visit from the past 1800 days.    Right Heart Cath: No results found for this or any previous visit from the past 1800 days.    Stress Test:  Nuclear:No results found for this or any previous visit from the past 1800 days.    Metabolic Stress: No results found for this or any previous visit from the past 1800 days.    Cardiac Imaging:  Cardiac Scoring: No results found for this or any previous visit from the past 1800 days.    Cardiac MRI: No results found for this or any previous visit from the past 1800 days.         Assessment/Plan  Assessment/Plan   Assessment & Plan  Symptomatic bradycardia    -TVP with Dr. Gillombardo 3/11/25  -discontinue Isoproterenol post TVP   -Plan for permanent dual chamber PPM with Dr. Salvador tomorrow 3/12/25, NPO after 000      NP discussed with Dr. Gillombardo regarding plan of care/ discharge plan      I spent 30 minutes in the professional and overall care of this patient.      Enid Bonds,  APRN-CNP     Oriented - self; Oriented - place; Oriented - time

## 2025-03-11 NOTE — Clinical Note
Temporary pacemaker tested repositioned. Temporary pacemaker location through right internal jugular vein. Heart rate: 80. Current 10 (mA). 0.1ma  lowest capture

## 2025-03-11 NOTE — Clinical Note
The ECG shows a paced rhythm and a sinus rhythm. Pacer turned on. The patient has permanent pacemaker.

## 2025-03-12 ENCOUNTER — APPOINTMENT (OUTPATIENT)
Dept: CARDIOLOGY | Facility: HOSPITAL | Age: 75
DRG: 242 | End: 2025-03-12
Payer: MEDICARE

## 2025-03-12 DIAGNOSIS — R00.1 SYMPTOMATIC BRADYCARDIA: Primary | ICD-10-CM

## 2025-03-12 DIAGNOSIS — Z95.0 PRESENCE OF CARDIAC PACEMAKER: ICD-10-CM

## 2025-03-12 LAB
ALBUMIN SERPL BCP-MCNC: 3.7 G/DL (ref 3.4–5)
ANION GAP SERPL CALC-SCNC: 11 MMOL/L (ref 10–20)
BUN SERPL-MCNC: 16 MG/DL (ref 6–23)
CALCIUM SERPL-MCNC: 9 MG/DL (ref 8.6–10.3)
CHLORIDE SERPL-SCNC: 104 MMOL/L (ref 98–107)
CO2 SERPL-SCNC: 25 MMOL/L (ref 21–32)
CREAT SERPL-MCNC: 0.74 MG/DL (ref 0.5–1.05)
EGFRCR SERPLBLD CKD-EPI 2021: 84 ML/MIN/1.73M*2
ERYTHROCYTE [DISTWIDTH] IN BLOOD BY AUTOMATED COUNT: 13.1 % (ref 11.5–14.5)
GLUCOSE SERPL-MCNC: 90 MG/DL (ref 74–99)
HCT VFR BLD AUTO: 41.4 % (ref 36–46)
HGB BLD-MCNC: 13.6 G/DL (ref 12–16)
HOLD SPECIMEN: NORMAL
MAGNESIUM SERPL-MCNC: 2.19 MG/DL (ref 1.6–2.4)
MCH RBC QN AUTO: 31.2 PG (ref 26–34)
MCHC RBC AUTO-ENTMCNC: 32.9 G/DL (ref 32–36)
MCV RBC AUTO: 95 FL (ref 80–100)
NRBC BLD-RTO: 0 /100 WBCS (ref 0–0)
PHOSPHATE SERPL-MCNC: 2.6 MG/DL (ref 2.5–4.9)
PLATELET # BLD AUTO: 223 X10*3/UL (ref 150–450)
POTASSIUM SERPL-SCNC: 4 MMOL/L (ref 3.5–5.3)
RBC # BLD AUTO: 4.36 X10*6/UL (ref 4–5.2)
SODIUM SERPL-SCNC: 136 MMOL/L (ref 136–145)
WBC # BLD AUTO: 7.8 X10*3/UL (ref 4.4–11.3)

## 2025-03-12 PROCEDURE — 99233 SBSQ HOSP IP/OBS HIGH 50: CPT | Performed by: NURSE PRACTITIONER

## 2025-03-12 PROCEDURE — 99291 CRITICAL CARE FIRST HOUR: CPT | Performed by: SURGERY

## 2025-03-12 PROCEDURE — 85027 COMPLETE CBC AUTOMATED: CPT | Performed by: NURSE PRACTITIONER

## 2025-03-12 PROCEDURE — 2500000004 HC RX 250 GENERAL PHARMACY W/ HCPCS (ALT 636 FOR OP/ED): Performed by: INTERNAL MEDICINE

## 2025-03-12 PROCEDURE — C1898 LEAD, PMKR, OTHER THAN TRANS: HCPCS | Performed by: INTERNAL MEDICINE

## 2025-03-12 PROCEDURE — 80069 RENAL FUNCTION PANEL: CPT | Performed by: SURGERY

## 2025-03-12 PROCEDURE — 37799 UNLISTED PX VASCULAR SURGERY: CPT | Performed by: SURGERY

## 2025-03-12 PROCEDURE — 33208 INSRT HEART PM ATRIAL & VENT: CPT | Performed by: INTERNAL MEDICINE

## 2025-03-12 PROCEDURE — 2500000001 HC RX 250 WO HCPCS SELF ADMINISTERED DRUGS (ALT 637 FOR MEDICARE OP): Performed by: NURSE PRACTITIONER

## 2025-03-12 PROCEDURE — 99152 MOD SED SAME PHYS/QHP 5/>YRS: CPT | Performed by: INTERNAL MEDICINE

## 2025-03-12 PROCEDURE — 2060000001 HC INTERMEDIATE ICU ROOM DAILY

## 2025-03-12 PROCEDURE — 99153 MOD SED SAME PHYS/QHP EA: CPT | Performed by: INTERNAL MEDICINE

## 2025-03-12 PROCEDURE — C1785 PMKR, DUAL, RATE-RESP: HCPCS | Performed by: INTERNAL MEDICINE

## 2025-03-12 PROCEDURE — 83735 ASSAY OF MAGNESIUM: CPT | Performed by: SURGERY

## 2025-03-12 PROCEDURE — 2750000001 HC OR 275 NO HCPCS: Performed by: INTERNAL MEDICINE

## 2025-03-12 PROCEDURE — RXMED WILLOW AMBULATORY MEDICATION CHARGE

## 2025-03-12 PROCEDURE — C1892 INTRO/SHEATH,FIXED,PEEL-AWAY: HCPCS | Performed by: INTERNAL MEDICINE

## 2025-03-12 PROCEDURE — 93005 ELECTROCARDIOGRAM TRACING: CPT

## 2025-03-12 PROCEDURE — 2720000007 HC OR 272 NO HCPCS: Performed by: INTERNAL MEDICINE

## 2025-03-12 PROCEDURE — 99232 SBSQ HOSP IP/OBS MODERATE 35: CPT | Performed by: STUDENT IN AN ORGANIZED HEALTH CARE EDUCATION/TRAINING PROGRAM

## 2025-03-12 DEVICE — PACING LEAD
Type: IMPLANTABLE DEVICE | Site: HEART | Status: FUNCTIONAL
Brand: ULTIPACE™

## 2025-03-12 DEVICE — DR PACEMAKER DDDR
Type: IMPLANTABLE DEVICE | Site: HEART | Status: FUNCTIONAL
Brand: ASSURITY MRI™

## 2025-03-12 RX ORDER — FENTANYL CITRATE 50 UG/ML
INJECTION, SOLUTION INTRAMUSCULAR; INTRAVENOUS AS NEEDED
Status: DISCONTINUED | OUTPATIENT
Start: 2025-03-12 | End: 2025-03-12 | Stop reason: HOSPADM

## 2025-03-12 RX ORDER — DOXYCYCLINE 100 MG/1
100 CAPSULE ORAL EVERY 12 HOURS SCHEDULED
Qty: 14 CAPSULE | Refills: 0 | Status: SHIPPED | OUTPATIENT
Start: 2025-03-12 | End: 2025-03-21

## 2025-03-12 RX ORDER — TRAMADOL HYDROCHLORIDE 50 MG/1
50 TABLET ORAL EVERY 6 HOURS PRN
Status: DISCONTINUED | OUTPATIENT
Start: 2025-03-12 | End: 2025-03-13

## 2025-03-12 RX ORDER — VANCOMYCIN HYDROCHLORIDE 1 G/200ML
1000 INJECTION, SOLUTION INTRAVENOUS ONCE
Status: DISCONTINUED | OUTPATIENT
Start: 2025-03-12 | End: 2025-03-12

## 2025-03-12 RX ORDER — VANCOMYCIN HYDROCHLORIDE 1 G/200ML
1000 INJECTION, SOLUTION INTRAVENOUS ONCE
Status: DISCONTINUED | OUTPATIENT
Start: 2025-03-12 | End: 2025-03-14 | Stop reason: HOSPADM

## 2025-03-12 RX ORDER — MIDAZOLAM HYDROCHLORIDE 1 MG/ML
INJECTION, SOLUTION INTRAMUSCULAR; INTRAVENOUS AS NEEDED
Status: DISCONTINUED | OUTPATIENT
Start: 2025-03-12 | End: 2025-03-12 | Stop reason: HOSPADM

## 2025-03-12 RX ORDER — LIDOCAINE HYDROCHLORIDE AND EPINEPHRINE 10; 10 UG/ML; MG/ML
INJECTION, SOLUTION INFILTRATION; PERINEURAL AS NEEDED
Status: DISCONTINUED | OUTPATIENT
Start: 2025-03-12 | End: 2025-03-12 | Stop reason: HOSPADM

## 2025-03-12 RX ORDER — VANCOMYCIN HYDROCHLORIDE 1 G/200ML
INJECTION, SOLUTION INTRAVENOUS CONTINUOUS PRN
Status: COMPLETED | OUTPATIENT
Start: 2025-03-12 | End: 2025-03-12

## 2025-03-12 RX ORDER — DOXYCYCLINE HYCLATE 100 MG
100 TABLET ORAL EVERY 12 HOURS SCHEDULED
Status: DISCONTINUED | OUTPATIENT
Start: 2025-03-12 | End: 2025-03-14 | Stop reason: HOSPADM

## 2025-03-12 RX ADMIN — ATORVASTATIN CALCIUM 10 MG: 10 TABLET, FILM COATED ORAL at 08:21

## 2025-03-12 RX ADMIN — DOXYCYCLINE HYCLATE 100 MG: 100 TABLET, COATED ORAL at 20:43

## 2025-03-12 RX ADMIN — ASPIRIN 81 MG: 81 TABLET, COATED ORAL at 08:21

## 2025-03-12 ASSESSMENT — ENCOUNTER SYMPTOMS
GASTROINTESTINAL NEGATIVE: 1
NEUROLOGICAL NEGATIVE: 1
MUSCULOSKELETAL NEGATIVE: 1
ALLERGIC/IMMUNOLOGIC NEGATIVE: 1
CARDIOVASCULAR NEGATIVE: 1
CONSTITUTIONAL NEGATIVE: 1
EYES NEGATIVE: 1
ENDOCRINE NEGATIVE: 1
PSYCHIATRIC NEGATIVE: 1
HEMATOLOGIC/LYMPHATIC NEGATIVE: 1
RESPIRATORY NEGATIVE: 1

## 2025-03-12 ASSESSMENT — PAIN - FUNCTIONAL ASSESSMENT
PAIN_FUNCTIONAL_ASSESSMENT: 0-10

## 2025-03-12 ASSESSMENT — PAIN SCALES - GENERAL
PAINLEVEL_OUTOF10: 0 - NO PAIN

## 2025-03-12 NOTE — PROGRESS NOTES
[facility] Progress Note    Admitted on:     3/11/2025  Length of Stay: 1 day(s)     Interval History     S/p TVP yesterday evening. No acute events overnight.       Objective   Objective     Vitals:    03/12/25 0600   Weight: (!) 43.8 kg (96 lb 9 oz)   Body mass index is 18.25 kg/m².        3/12/2025     2:32 AM 3/12/2025     3:00 AM 3/12/2025     4:00 AM 3/12/2025     5:00 AM 3/12/2025     5:32 AM 3/12/2025     6:00 AM 3/12/2025     7:00 AM   Vitals   Systolic 123 132 126 115 107 125 143   Diastolic 89 80 72 73 87 67 83   BP Location   Left arm       Heart Rate 80 80 80 80 65 80 80   Temp   36.6 °C (97.9 °F)       Resp   19       Weight (lb)      96.56    BMI      18.25 kg/m2    BSA (m2)      1.37 m2         Vent settings:       Intake/Output Summary (Last 24 hours) at 3/12/2025 0723  Last data filed at 3/12/2025 0500  Gross per 24 hour   Intake 150 ml   Output 505 ml   Net -355 ml     Physical Exam:  Awake alert, NAD  TVP sheath in place, no bleeding, capturing at 80 BPM  Pt without symptoms currently  Ext warm, well perfused    Medications     Scheduled Medications:   aspirin, 81 mg, oral, Daily  atorvastatin, 10 mg, oral, Daily  enoxaparin, 40 mg, subcutaneous, q24h  influenza, 0.5 mL, intramuscular, During hospitalization  sodium chloride, 500 mL, intravenous, Once       Continuous Medications:       PRN Medications:     Labs     Results from last 72 hours   Lab Units 03/12/25  0536 03/11/25  0834   GLUCOSE mg/dL 90 93   SODIUM mmol/L 136 134*   POTASSIUM mmol/L 4.0 4.4   CHLORIDE mmol/L 104 102   CO2 mmol/L 25 26   BUN mg/dL 16 16   CREATININE mg/dL 0.74 0.83   EGFR mL/min/1.73m*2 84 73   CALCIUM mg/dL 9.0 9.2   ALBUMIN g/dL 3.7 3.8   MAGNESIUM mg/dL 2.19 1.90   PHOSPHORUS mg/dL 2.6  --      Results from last 72 hours   Lab Units 03/11/25  0834   ALK PHOS U/L 44   ALT U/L 45   AST U/L 39   BILIRUBIN TOTAL mg/dL 0.8   PROTEIN TOTAL g/dL 6.2*     Results from last 72 hours   Lab Units  03/11/25  0834   TROPHS ng/L 8         Results from last 72 hours   Lab Units 03/12/25  0536 03/11/25  0834   WBC AUTO x10*3/uL 7.8 8.0   NRBC AUTO /100 WBCs 0.0 0.0   RBC AUTO x10*6/uL 4.36 3.77*   HEMOGLOBIN g/dL 13.6 11.7*   HEMATOCRIT % 41.4 35.5*   MCV fL 95 94   MCH pg 31.2 31.0   MCHC g/dL 32.9 33.0   RDW % 13.1 13.2   PLATELETS AUTO x10*3/uL 223 218         Lab Results   Component Value Date    GRAMSTAIN  12/09/2024     Gram stain indicates specimen consists of lower respiratory tract secretions.    GRAMSTAIN No predominant organism 12/09/2024     Lab Results   Component Value Date    URINECULTURE No growth 01/10/2025       Imaging and Diagnostic Studies     Recent imaging and diagnostic studies reviewed.       Assessment / Plan     Assessment:  Abena Shaw is a 74 y.o. year old female patient admitted to the MICU on 03/11/25 for complete heart block and need for TVP.     Plan:  -s/p TVP, capturing at 80BPM, pt needs to be strict bedrest until PPM today  -hold betablocker  -DVT prophylaxis  -cardiology following            Sydnee Juarez MD    This patient is critically ill/injured due to acute impairment in one or more vital organ systems, such that there is a probably of imminent or life-threatening deterioration of the patient's condition.  I spent 35 minutes in the direct delivery of medical care that involves high complexity decision making to treat single or multiple vital organ system failure and/or prevent further life-threatening deterioration of the patient's condition.  This time does not include separately billable procedures.

## 2025-03-12 NOTE — H&P
History Of Present Illness  Abena Shaw is a 74 y.o. female presenting with symptomatic bradycardia s/p temporary pacemaker placement on 3/11/25 via [facility], here for PPM placement. PMH includes dyslipidemia, prior smoking history, fatigue, esophagitis, memory loss, osteopenia, vitamin D deficiency, history of Renea-Parkinson-White status post ablation in . Patient denies any current dizziness, chest pain, SOB.     Past Medical History:  Past Medical History:   Diagnosis Date    Anxiety     Esophagitis     H/O paroxysmal supraventricular tachycardia 2024    Hyperlipidemia     Other specified noninflammatory disorders of vulva and perineum 2017    Vulvar lesion    Prediabetes     Reactive airway disease (HHS-HCC)     WPW (Renea-Parkinson-White syndrome)     s/p ablation         Past Surgical History:  Past Surgical History:   Procedure Laterality Date    BREAST SURGERY      augmentation    CARDIAC ELECTROPHYSIOLOGY STUDY AND ABLATION      CHOLECYSTECTOMY  10/15/2015    Cholecystectomy Laparoscopic          Social History:  Social History     Tobacco Use    Smoking status: Former     Current packs/day: 0.00     Average packs/day: 1.3 packs/day for 51.0 years (63.8 ttl pk-yrs)     Types: Cigarettes     Start date:      Quit date: 2019     Years since quittin.1     Passive exposure: Past    Smokeless tobacco: Never   Vaping Use    Vaping status: Every Day    Substances: Nicotine   Substance Use Topics    Alcohol use: Never    Drug use: Never       Family History:  Family History   Problem Relation Name Age of Onset    Breast cancer Mother          Allergies:  No Known Allergies     Home Medications:  Current Outpatient Medications   Medication Instructions    aspirin 81 mg, Daily    atorvastatin (LIPITOR) 10 mg, oral, Daily    calcium carbonate-vitamin D3 500 mg-5 mcg (200 unit) tablet 1 tablet, Daily    CALCIUM ORAL Calcium TABS   Refills: 0       Active    cholecalciferol (Vitamin  D-3) 50 mcg (2,000 unit) capsule Daily RT    cyanocobalamin, vitamin B-12, 2,500 mcg tablet, sublingual Daily RT    metoprolol succinate XL (TOPROL-XL) 50 mg, oral, Daily    pyridoxine (VITAMIN B-6) 100 mg, oral, Daily       Inpatient Medications:  Scheduled medications   Medication Dose Route Frequency    aspirin  81 mg oral Daily    atorvastatin  10 mg oral Daily    [Held by provider] enoxaparin  40 mg subcutaneous q24h    influenza  0.5 mL intramuscular During hospitalization    sodium chloride  500 mL intravenous Once    vancomycin  1,000 mg intravenous Once     PRN medications   Medication    acetaminophen    Or    acetaminophen    Or    acetaminophen    melatonin    ondansetron    Or    ondansetron    polyethylene glycol     Continuous Medications   Medication Dose Last Rate         Review of Systems   Constitutional: Negative.    HENT: Negative.     Eyes: Negative.    Respiratory: Negative.     Cardiovascular: Negative.    Gastrointestinal: Negative.    Endocrine: Negative.    Genitourinary: Negative.    Musculoskeletal: Negative.    Skin: Negative.    Allergic/Immunologic: Negative.    Neurological: Negative.    Hematological: Negative.    Psychiatric/Behavioral: Negative.            Physical Exam  Constitutional:       General: She is awake. She is not in acute distress.     Appearance: She is not ill-appearing.   Cardiovascular:      Rate and Rhythm: Normal rate and regular rhythm.      Pulses: Normal pulses.           Radial pulses are 2+ on the right side and 2+ on the left side.        Dorsalis pedis pulses are 2+ on the right side and 2+ on the left side.      Heart sounds: Normal heart sounds. No murmur heard.     Comments: Telemetry reviewed shows some beats that were not captured with pacemaker- HR as low as 65, otherwise paced rhythm rate of 80. RIJ temporary pacemaker in place  Pulmonary:      Effort: Pulmonary effort is normal.      Breath sounds: Normal breath sounds and air entry.   Abdominal:  "     General: Bowel sounds are normal.      Palpations: Abdomen is soft.      Tenderness: There is no abdominal tenderness.   Musculoskeletal:      Right lower leg: No edema.      Left lower leg: No edema.   Skin:     General: Skin is warm and dry.   Neurological:      General: No focal deficit present.      Mental Status: She is alert and oriented to person, place, and time.      GCS: GCS eye subscore is 4. GCS verbal subscore is 5. GCS motor subscore is 6.   Psychiatric:         Mood and Affect: Mood normal.         Behavior: Behavior is cooperative.        Sedation Plan    ASA 3     Mallampati class: III.           NPO since 0000    Last Recorded Vitals  Blood pressure 119/89, pulse 80, temperature 36.8 °C (98.2 °F), temperature source Temporal, resp. rate 19, height 1.549 m (5' 1\"), weight (!) 43.8 kg (96 lb 9 oz), SpO2 100%, not currently breastfeeding.    Oxygen Dose: *2 L/min    Vitals from the Past 24 Hours  Heart Rate:  []   Temp:  [36.6 °C (97.9 °F)-37.1 °C (98.8 °F)]   Resp:  [15-27]   BP: ()/()   Weight:  [43.8 kg (96 lb 9 oz)]   SpO2:  [95 %-100 %]     Oxygen Dose: *2 L/min    Relevant Results    Labs      CBC:   Recent Labs     03/12/25  0536 03/11/25  0834 01/10/25  1005 12/05/24  1416 06/14/24  1309 05/13/24  0916   WBC 7.8 8.0 7.6 6.7 9.2 8.8   HGB 13.6 11.7* 10.9* 14.0 13.6 13.9   HCT 41.4 35.5* 34.2* 43.3 42.4 46.4*    218 163 322 321 298   MCV 95 94 96 95 95 100     BMP/CMP:   Recent Labs     03/12/25  0536 03/11/25  0834 01/10/25  1005 12/05/24  1416 06/14/24  1309 05/13/24  0916 08/07/23  1745 06/20/22  1433    134* 139 138 138 141 141 143   K 4.0 4.4 3.5 5.0 4.8 4.1 4.1 4.2    102 106 103 99 105 104 105   BUN 16 16 16 15 14 17 13 11   CREATININE 0.74 0.83 0.81 0.83 0.87 0.95 0.81 0.90   CO2 25 26 26 30 29 28 29 28   CALCIUM 9.0 9.2 8.4* 10.0 10.3 9.4 10.0 9.8   PROT  --  6.2* 6.8 7.5  --  7.2 7.4 7.2   BILITOT  --  0.8 0.6 1.0  --  0.5 0.6 0.8   ALKPHOS  -- " " 44 40 47  --  43 46 45   ALT  --  45 22 14  --  9 12 9   AST  --  39 29 22  --  15 20 13   GLUCOSE 90 93 96 96 81 113* 95 93      Magnesium:   Recent Labs     03/12/25  0536 03/11/25  0834   MG 2.19 1.90     Lipid Panel:   Recent Labs     05/13/24  0916 08/07/23  1745 06/20/22  1433 06/24/21  0000 06/01/20  0000 04/11/19  0508 04/11/18  1252   CHOL 182 190 186 197 204* 214* 219*   HDL 71.4 72.7 85.8 72.5 75.4 69.6 73.3   CHHDL 2.5 2.6 2.2 2.7 2.7 3.1 3.0   VLDL 14 16 13 16 15 15 19   TRIG 68 82 64 79 76 75 97   NHDL 111  --   --   --   --   --   --      Cardiac   Results from last 7 days   Lab Units 03/11/25  0834   TROPHS ng/L 8   BNP pg/mL 233*      Hemoglobin A1C:   Recent Labs     12/05/24  1341   HGBA1C 5.8     TSH/ Free T4:   Recent Labs     03/11/25  0834 12/05/24  1416 05/13/24  0916 08/07/23  1745 06/20/22  1433 06/24/21  0000 06/01/20  0000 04/11/19  0508 04/11/18  1252   TSH 2.05 2.21 2.18 1.70 3.38 2.93 3.10 2.73 2.81     Iron:   Recent Labs     03/11/25  0834 01/10/25  1005   * 47     Coag:   Results from last 7 days   Lab Units 03/11/25  0834   INR  1.0     ABO: No results found for: \"ABO\"    Past Cardiology Tests (Last 3 Years):    EKG:  Recent Labs     03/11/25  0835 01/10/25  1037 08/04/15  1608   ATRRATE 74 91 72   VENTRATE 37 91 72   PRINT 188 158 148   QRSDUR 118 122 116   QTCFRED 425 432 457   QTCCALCB 392 462 470     Encounter Date: 03/11/25   ECG 12 lead   Result Value    Ventricular Rate 37    Atrial Rate 74    IN Interval 188    QRS Duration 118    QT Interval 500    QTC Calculation(Bazett) 392    P Axis 70    R Axis -15    T Axis 54    QRS Count 6    Q Onset 209    P Onset 115    P Offset 163    T Offset 459    QTC Fredericia 425    Narrative    Sinus rhythm with 2nd degree AV block with 2:1 AV conduction  Possible Left atrial enlargement  RSR' or QR pattern in V1 suggests right ventricular conduction delay  Anterolateral infarct (cited on or before 04-AUG-2015)  Abnormal ECG  When " compared with ECG of 10-DORIAN-2025 09:22,  Significant changes have occurred  See ED provider note for full interpretation and clinical correlation  Confirmed by Yudi Hamilton ([id number]) on 3/11/2025 12:30:05 PM     Echo:  Echocardiogram:   Transthoracic Echo (TTE) Complete 03/07/2025    PHYSICIAN INTERPRETATION:  Left Ventricle: Left ventricular ejection fraction is normal, calculated by Marshall's biplane at 59%. There are no regional left ventricular wall motion abnormalities. The left ventricular cavity size is normal. There is mildly increased septal and normal posterior left ventricular wall thickness. There is left ventricular concentric remodeling. Spectral Doppler shows a Grade I (impaired relaxation pattern) of left ventricular diastolic filling with normal left atrial filling pressure.  Left Atrium: The left atrium is mildly dilated.  Right Ventricle: The right ventricle is normal in size. There is normal right ventricular global systolic function.  Right Atrium: The right atrium is normal in size.  Aortic Valve: The aortic valve is trileaflet. There is no evidence of aortic valve regurgitation. The peak instantaneous gradient of the aortic valve is 6 mmHg.  Mitral Valve: The mitral valve is mildly thickened. There is mild mitral valve regurgitation.  Tricuspid Valve: The tricuspid valve is structurally normal. There is mild tricuspid regurgitation.  Pulmonic Valve: The pulmonic valve is structurally normal. There is no indication of pulmonic valve regurgitation.  Pericardium: There is no pericardial effusion noted.  Aorta: The aortic root is normal. The Ao Sinus is 3.50 cm. The Asc Ao is 3.80 cm.  Pulmonary Artery: The tricuspid regurgitant velocity is 2.88 m/s, and with an estimated right atrial pressure of 3 mmHg, the estimated pulmonary artery pressure is mildly elevated with the RVSP at 36.1 mmHg.  Systemic Veins: The inferior vena cava appears normal in size, with IVC inspiratory collapse less than  50%.  In comparison to the previous echocardiogram(s): Compared with study dated 4/21/2023, no significant change.      CONCLUSIONS:  1. Left ventricular ejection fraction is normal, calculated by Marshall's biplane at 59%.  2. Spectral Doppler shows a Grade I (impaired relaxation pattern) of left ventricular diastolic filling with normal left atrial filling pressure.  3. The left atrium is mildly dilated.  4. The inferior vena cava appears normal in size, with IVC inspiratory collapse less than 50%.      Ejection Fractions:  LV EF   Date/Time Value Ref Range Status   03/07/2025 02:49 PM 59 %      Cath:  Coronary Angiography: No results found for this or any previous visit from the past 1800 days.    Right Heart Cath: No results found for this or any previous visit from the past 1800 days.    Stress Test:  Nuclear:No results found for this or any previous visit from the past 1800 days.    Metabolic Stress: No results found for this or any previous visit from the past 1800 days.    Cardiac Imaging:  Cardiac Scoring: No results found for this or any previous visit from the past 1800 days.    Cardiac MRI: No results found for this or any previous visit from the past 1800 days.         Assessment/Plan  Assessment/Plan   Assessment & Plan  Symptomatic bradycardia    symptomatic bradycardia s/p temporary pacemaker placement on 3/11/25 via [facility]  -NP discussed with Dr. Salvador re: beats that were not captured, no further change at this time as she will have PPM placement in the next couple of hours  -PPM placement with Dr. Salvador on 3/12/25  -prophylaxis antibiotic prior to implant  -CXR tomorrow to r/o pneumothorax and ensure lead placement intact  -device nurse to see tomorrow morning for device interrogation  -plan to dc home on prophylactic antibiotics        NP discussed with Dr. Salvador regarding plan of care/ discharge plan      I spent 30 minutes in the professional and overall care of this patient.      Senthil Botello,  APRN-CNP, DNP

## 2025-03-12 NOTE — PROGRESS NOTES
"Subjective Data:  TVP yesterday -> plan for PPM today      Objective Data:  Last Recorded Vitals:  Vitals:    25 0700 25 0900 25 1000 25 1100   BP: 143/83 119/89 130/66 148/67   BP Location:       Patient Position:       Pulse: 80 80 66 62   Resp:       Temp:  36.8 °C (98.2 °F)     TempSrc:  Temporal     SpO2: 97% 100% (!) 83% 96%   Weight:       Height:         Medical Gas Therapy: None (Room air)  Medical Gas Delivery Method: Nasal cannula (1.5L)  Weight  Av.4 kg (95 lb 12.3 oz)  Min: 40.8 kg (89 lb 15.2 oz)  Max: 45.4 kg (100 lb)    LABS:  CMP:  Results from last 7 days   Lab Units 25  0536 25  0834   SODIUM mmol/L 136 134*   POTASSIUM mmol/L 4.0 4.4   CHLORIDE mmol/L 104 102   CO2 mmol/L 25 26   ANION GAP mmol/L 11 10   BUN mg/dL 16 16   CREATININE mg/dL 0.74 0.83   EGFR mL/min/1.73m*2 84 73   MAGNESIUM mg/dL 2.19 1.90   ALBUMIN g/dL 3.7 3.8   ALT U/L  --  45   AST U/L  --  39   BILIRUBIN TOTAL mg/dL  --  0.8     CBC:  Results from last 7 days   Lab Units 25  0536 25  0834   WBC AUTO x10*3/uL 7.8 8.0   HEMOGLOBIN g/dL 13.6 11.7*   HEMATOCRIT % 41.4 35.5*   PLATELETS AUTO x10*3/uL 223 218   MCV fL 95 94     COAG:   Results from last 7 days   Lab Units 25  0834   INR  1.0     ABO: No results found for: \"ABO\"  HEME/ENDO:  Results from last 7 days   Lab Units 25  0834   TSH mIU/L 2.05      CARDIAC:   Results from last 7 days   Lab Units 25  0834   TROPHS ng/L 8   BNP pg/mL 233*             Last I/O:    Intake/Output Summary (Last 24 hours) at 3/12/2025 1132  Last data filed at 3/12/2025 0500  Gross per 24 hour   Intake 150 ml   Output 505 ml   Net -355 ml     Net IO Since Admission: -355 mL [25 1132]      Imaging Results:  ECG 12 lead    Result Date: 3/11/2025  Sinus rhythm with 2nd degree AV block with 2:1 AV conduction Possible Left atrial enlargement RSR' or QR pattern in V1 suggests right ventricular conduction delay Anterolateral " infarct (cited on or before 04-AUG-2015) Abnormal ECG When compared with ECG of 10-DORIAN-2025 09:22, Significant changes have occurred See ED provider note for full interpretation and clinical correlation Confirmed by Yudi Hamilton ([id number]) on 3/11/2025 12:30:05 PM    XR chest 1 view    Result Date: 3/11/2025  Interpreted By:  Ashtyn Brooks, STUDY: XR CHEST 1 VIEW;  3/11/2025 8:44 am   INDICATION: Signs/Symptoms:fatigue.   COMPARISON: 01/10/2025   ACCESSION NUMBER(S): [id number]   ORDERING CLINICIAN: ANDREA JOHN   FINDINGS: CARDIOMEDIASTINAL SILHOUETTE: Cardiomediastinal silhouette is normal in size and configuration.     LUNGS: Lungs are clear.   ABDOMEN: No remarkable upper abdominal findings.     BONES: No acute osseous changes.   There are bilateral breast implants with calcification of the fibrous capsules.       No acute cardiopulmonary process.   MACRO: None   Signed by: Ashtyn Brooks 3/11/2025 8:47 AM Dictation workstation:   [id number]          Past Cardiology Tests (Last 3 Years):  EKG:  Results for orders placed during the hospital encounter of 03/11/25    ECG 12 lead    Narrative  Sinus rhythm with 2nd degree AV block with 2:1 AV conduction  Possible Left atrial enlargement  RSR' or QR pattern in V1 suggests right ventricular conduction delay  Anterolateral infarct (cited on or before 04-AUG-2015)  Abnormal ECG  When compared with ECG of 10-DORIAN-2025 09:22,  Significant changes have occurred  See ED provider note for full interpretation and clinical correlation  Confirmed by Yudi Hamilton ([id number]) on 3/11/2025 12:30:05 PM      Results for orders placed during the hospital encounter of 01/10/25    ECG 12 lead    Narrative  Normal sinus rhythm  Right bundle branch block  Left anterior fascicular block   Bifascicular block   Minimal voltage criteria for LVH, may be normal variant ( R in aVL )  Lateral infarct (cited on or before 04-AUG-2015)  Abnormal ECG  When compared with ECG of  01-MAY-2023 11:36,  Left anterior fascicular block is now Present  Minimal criteria for Inferior infarct are no longer Present  Questionable change in initial forces of Lateral leads  See ED provider note for full interpretation and clinical correlation  Confirmed by Yudi Condon ([id number]) on 1/19/2025 9:26:46 PM      Results for orders placed in visit on 03/06/23    ECG 12 lead    Narrative  Test done    Echo:  Results for orders placed in visit on 03/07/25    Transthoracic Echo (TTE) Complete    Narrative  [facility], [address] [place] [address]  Tel [phone] and Fax [phone]    TRANSTHORACIC ECHOCARDIOGRAM REPORT      Patient Name:       TUNDE Alanis Physician:    [id number] Aleman MD  Study Date:         3/7/2025            Ordering Provider:    Dayna ALEMAN  MRN/PID:            [id number]            Fellow:  Accession#:         [id number]        Nurse:  Date of Birth/Age:  1950 / 74      Sonographer:          Elena Szymanski RCS  years  Gender assigned at  F                   Additional Staff:  Birth:  Height:             154.94 cm           Admit Date:  Weight:             45.36 kg            Admission Status:     Outpatient  BSA / BMI:          1.41 m2 / 18.89     Encounter#:           [id number]  kg/m2  Blood Pressure:     122/64 mmHg         Department Location:  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 Dyslipidemia, Renea-Parkinson-White syndrome;  Supraventricular tachycardia.    Study Detail: The following Echo studies were performed: 2D, M-Mode, Doppler and  color flow. Technically challenging study due to prominent lung  artifact.      PHYSICIAN INTERPRETATION:  Left Ventricle: Left  ventricular ejection fraction is normal, calculated by Marshall's biplane at 59%. There are no regional left ventricular wall motion abnormalities. The left ventricular cavity size is normal. There is mildly increased septal and normal posterior left ventricular wall thickness. There is left ventricular concentric remodeling. Spectral Doppler shows a Grade I (impaired relaxation pattern) of left ventricular diastolic filling with normal left atrial filling pressure.  Left Atrium: The left atrium is mildly dilated.  Right Ventricle: The right ventricle is normal in size. There is normal right ventricular global systolic function.  Right Atrium: The right atrium is normal in size.  Aortic Valve: The aortic valve is trileaflet. There is no evidence of aortic valve regurgitation. The peak instantaneous gradient of the aortic valve is 6 mmHg.  Mitral Valve: The mitral valve is mildly thickened. There is mild mitral valve regurgitation.  Tricuspid Valve: The tricuspid valve is structurally normal. There is mild tricuspid regurgitation.  Pulmonic Valve: The pulmonic valve is structurally normal. There is no indication of pulmonic valve regurgitation.  Pericardium: There is no pericardial effusion noted.  Aorta: The aortic root is normal. The Ao Sinus is 3.50 cm. The Asc Ao is 3.80 cm.  Pulmonary Artery: The tricuspid regurgitant velocity is 2.88 m/s, and with an estimated right atrial pressure of 3 mmHg, the estimated pulmonary artery pressure is mildly elevated with the RVSP at 36.1 mmHg.  Systemic Veins: The inferior vena cava appears normal in size, with IVC inspiratory collapse less than 50%.  In comparison to the previous echocardiogram(s): Compared with study dated 4/21/2023, no significant change.      CONCLUSIONS:  1. Left ventricular ejection fraction is normal, calculated by Marshall's biplane at 59%.  2. Spectral Doppler shows a Grade I (impaired relaxation pattern) of left ventricular diastolic filling with  normal left atrial filling pressure.  3. The left atrium is mildly dilated.  4. The inferior vena cava appears normal in size, with IVC inspiratory collapse less than 50%.    QUANTITATIVE DATA SUMMARY:    2D MEASUREMENTS:          Normal Ranges:  LAs:             2.63 cm  (2.7-4.0cm)  RVIDd:           1.96 cm  (0.9-3.6cm)  IVSd:            1.14 cm  (0.6-1.1cm)  LVPWd:           0.78 cm  (0.6-1.1cm)  LVIDd:           3.45 cm  (3.9-5.9cm)  LVIDs:           2.39 cm  LV Mass Index:   68 g/m2  LVEDV Index:     40 ml/m2  LV % FS          30.7 %      LEFT ATRIUM:                  Normal Ranges:  LA Vol A4C:        36.3 ml    (22+/-6mL/m2)  LA Vol A2C:        41.5 ml  LA Vol BP:         40.5 ml  LA Vol Index A4C:  25.8ml/m2  LA Vol Index A2C:  29.5 ml/m2  LA Vol Index BP:   28.8 ml/m2  LA Area A4C:       13.7 cm2  LA Area A2C:       15.3 cm2  LA Major Axis A4C: 4.4 cm  LA Major Axis A2C: 4.8 cm  LA Volume Index:   28.8 ml/m2  LA Vol A4C:        33.9 ml      RIGHT ATRIUM:                 Normal Ranges:  RA Vol A4C:        24.0 ml    (8.3-19.5ml)  RA Vol Index A4C:  17.1 ml/m2  RA Area A4C:       10.9 cm2  RA Major Axis A4C: 4.2 cm      AORTA MEASUREMENTS:         Normal Ranges:  Ao Sinus, d:        3.50 cm (2.1-3.5cm)  Asc Ao, d:          3.80 cm (2.1-3.4cm)      LV SYSTOLIC FUNCTION:  Normal Ranges:  EF-A4C View:    59 % (>=55%)  EF-A2C View:    60 %  EF-Biplane:     59 %  LV EF Reported: 59 %      LV DIASTOLIC FUNCTION:             Normal Ranges:  MV Peak E:             1.13 m/s    (0.7-1.2 m/s)  MV Peak A:             0.53 m/s    (0.42-0.7 m/s)  E/A Ratio:             2.12        (1.0-2.2)  MV A Dur:              171.27 msec  PulmV Sys Jerome:         48.35 cm/s  PulmV Salas Jerome:        60.71 cm/s  PulmV S/D Jerome:         0.80  PulmV A Revs Jerome:      25.44 cm/s  PulmV A Revs Dur:      117.98 msec      MITRAL VALVE:          Normal Ranges:  MV DT:        231 msec (150-240msec)      MITRAL INSUFFICIENCY:            Normal  Ranges:  PISA Radius:          0.4 cm  MR Alias Jerome:         92.1 cm/s  MR Flow Rt:           80.53 ml/s      AORTIC VALVE:           Normal Ranges:  AoV Vmax:      1.20 m/s (<=1.7m/s)  AoV Peak P.8 mmHg (<20mmHg)  LVOT Max Jerome:  1.09 m/s (<=1.1m/s)  LVOT VTI:      27.26 cm  LVOT Diameter: 2.12 cm  (1.8-2.4cm)  AoV Area,Vmax: 3.18 cm2 (2.5-4.5cm2)      RIGHT VENTRICLE:  RV Basal 3.10 cm  RV Mid   2.00 cm  RV Major 5.6 cm  TAPSE:   22.0 mm  RV s'    0.13 m/s      TRICUSPID VALVE/RVSP:          Normal Ranges:  Peak TR Velocity:     2.88 m/s  RV Syst Pressure:     36 mmHg  (< 30mmHg)  IVC Diam:             2.10 cm      PULMONIC VALVE:          Normal Ranges:  PV Accel Time:  88 msec  (>120ms)  PV Max Jerome:     0.7 m/s  (0.6-0.9m/s)  PV Max P.2 mmHg      PULMONARY VEINS:  PulmV A Revs Dur: 117.98 msec  PulmV A Revs Jerome: 25.44 cm/s  PulmV Salas Jerome:   60.71 cm/s  PulmV S/D Jerome:    0.80  PulmV Sys Jerome:    48.35 cm/s      AORTA:  Asc Ao Diam 3.75 cm      [id number] Chyna Aleman MD  Electronically signed on 3/7/2025 at 6:01:16 PM        ** Final **    Ejection Fractions:  LV EF   Date/Time Value Ref Range Status   2025 02:49 PM 59 %      Cath:  No results found for this or any previous visit.    Stress Test:  Results for orders placed in visit on 23    Nuclear Stress Test    Narrative  Interpreted By:  TENISHA FLORENCE MD and FATUMA BAXTER MD  MRN: [id number]  Patient Name: TUNDE ALEX    STUDY:  CARDIAC STRESS/REST INJECTION; CARDIAC STRESS/REST (MYOCARDIAL  PERFUSION/MIBI); PART 2 STRESS OR REST (NO CHARGE);  2023 1:28  pm; 2023 1:29 pm; 2023 1:30 pm    INDICATION:  chest apin Other chest pain R07.89: Atypical chest pain.    COMPARISON:  None.    ACCESSION NUMBER(S):  [id number]; [id number]; [id number]    ORDERING CLINICIAN:  CHYNA ALEMAN    TECHNIQUE:  DIVISION OF NUCLEAR MEDICINE  STRESS MYOCARDIAL PERFUSION SCAN, ONE DAY PROTOCOL    The patient received an intravenous injection of 10.5  mCi of Tc-99m  Myoview and resting emission tomographic (SPECT) images of the  myocardium were acquired. The patient then underwent treadmill stress  exercising to 96 % of MPHR and achieving 7.2 METS.  At peak stress an  additional injection of  36 mCi of Tc-99m Myoview was administered  and stress phase SPECT images of the myocardium were then acquired.  This acquisition included ECG-gated images to assess and quantify  ventricular function. Low dose CT was acquired for attenuation  correction.    FINDINGS:  Both stress and rest studies demonstrate grossly normal perfusion  throughout the left ventricle.    The left ventricle is normal in size.    Gated images demonstrate normal LV wall motion with an LV EF  estimated at greater than 65%.    Attenuation correction CT images are below the threshold for a  diagnostic exam.    Impression  Negative myocardial perfusion study without evidence of inducible  ischemia or prior infarction.    The left ventricle is normal in size.    Normal LV wall motion with a post-stress LV EF estimated at greater  than 65%.    I personally reviewed the images/study and I agree with the findings  as stated.  This study was interpreted at [facility], [place], [place].    Cardiac Imaging:  No results found for this or any previous visit.      Inpatient Medications:  Scheduled medications   Medication Dose Route Frequency    aspirin  81 mg oral Daily    atorvastatin  10 mg oral Daily    [Held by provider] enoxaparin  40 mg subcutaneous q24h    influenza  0.5 mL intramuscular During hospitalization    sodium chloride  500 mL intravenous Once    vancomycin  1,000 mg intravenous Once     PRN medications   Medication    acetaminophen    Or    acetaminophen    Or    acetaminophen    melatonin    ondansetron    Or    ondansetron    polyethylene glycol     Continuous Medications   Medication Dose Last Rate     Physical Exam:  GENERAL: alert, cooperative, pleasant,  in no acute distress  SKIN: warm, dry  NECK: no JVD  CARDIAC: Bradycardia no murmurs  PULMONARY: Normal respiratory efforts, lungs clear to auscultation bilaterally.  ABDOMEN: soft, nondistended  EXTREMITIES: no lower extremity edema  NEURO: Alert and oriented x 3.  Grossly normal.  Moves all 4 extremities.     Assessment/Plan   Abena Shaw is a 74 y.o. female with past medical history significant for dyslipidemia, prior smoking history, fatigue, esophagitis, memory loss, osteopenia, vitamin D deficiency, history of Renea-Parkinson-White status post ablation in 2014.  Presented with lightheadedness.  Cardiology is consulted for bradycardia, second-degree AV block.     Home CV meds: Aspirin  81 mg daily Atorvastatin 10 mg daily Metoprolol succinate 50 mg daily     #High-grade AV block  Patient presented with intermittent episodes of dizziness and lightheadedness.  ECG showed 2 :1 AV block heart rate 30s.  Blood pressure acceptable at this time.  Reports last dose of metoprolol was yesterday morning around 9 AM.  TSH is normal.  Recent echocardiogram with normal LV systolic function.     #Dyslipidemia  Continue outpatient statin     #History of Renea-Parkinson-White status post ablation     Recommendations  Continue to monitor on telemetry  Continue to hold any AV julia blockers  PPM today   Agree with careful monitoring in the ICU.          Code Status:  Full Code      Jacey Pan, APRN-CNP      Thank you for allowing me to participate in their care.  Please feel free to call me with any further questions or concerns.    Gutierrez Willard MD, FAC, MELA FINN  Division of Cardiovascular Medicine  System Director, Nuclear Cardiology   Medical Director, [facility] [place], [facility]   Clinical , [employer]  [email or web address]   Office:  [phone]           I personally  reviewed the patient's vital signs, telemetry, recent labs, medications, orders, EKGs, and pertinent cardiac imaging/ echocardiography.  I have reviewed the ISRA's encounter note, approve the ISRA's documentation and have edited the note to reflect my diagnostic and therapeutic plan.

## 2025-03-12 NOTE — DISCHARGE INSTRUCTIONS
Home-going Instructions After Device Implant    Incision:  Please keep your incision dry and/or open to air for one week after implant (date: _3/19/25_________).    _______Remove white rectangular outer island dressing tomorrow (date: __3/13/25_______)                           _______ If you have skin glue, please keep your incision dry for 1 week. Do not remove it, as it will peel off by itself.  _______If you have steri strips, please keep your incision dry for 1 week. Steri strips may be removed after one week/will fall off on their own.  _______ If you have an occlusive dressing like Aquacel (tan-colored bandage) or Mepilex border, please leave on for one week.  Do not remove the steri strips, they will fall off on their own    Call the Device Clinic at [phone] if you have any questions about your instructions, Monday-Friday between the hours of 7:00 am - 4:30 pm. After hours please call your physician's office.   After one week, you may wash the site with soap and water.   Inspect your incision each day.  If you note increased redness, swelling, or drainage, or if you develop a fever, please call your doctor IMMEDIATELY.     Your Doctor:  Dr. Charles Salvador    Telephone:  [phone]    Pain:  It is normal for the area around the incision to be tender for a few weeks following surgery.  Pain relievers such as Tylenol or Motrin are usually sufficient for pain relief.  The pain should get better each day, if it gets worse, please contact your doctor.    Activity Restrictions: new implant 4 weeks (date:___4/9/25___________) / device replacement 1 week __________________.  Avoid gross arm movement on the side of your new implant.  Do not raise or stretch your arm above shoulder level.  Do not pick-up weights greater than 15 lbs.  Avoid activities such as golf or swimming for six weeks.      Driving: Please do not resume driving for 1 week(s) date: ___3/19/25______________.     ID Card:  It is important that  you carry your device ID card with you at all times.  Inform all doctors and healthcare providers that you have a pacemaker or defibrillator.  Until the lead wires are completely healed in your heart, a prophylactic antibiotic may need to be given to you prior to procedures such as deep cleaning or extraction of teeth.    Electromagnetic Interference:  Microwave ovens are safe to use.  Cellular telephones should be held to the ear that is opposite your device.  If you are scheduled for a MRI, please notify the Device Clinic to check if you have a compatible device.  Present your device ID card to the airport .  The detector wand may be used below waist level and to inspect your shoes.  Read the patient booklet for more information.  You may call the device clinic or the patient services department of the device  with questions about specific electrical appliances and interference problems.    It is your responsibility to make and keep appointments. After each visit on your way out, make an appointment for your next visit. Please follow the recommendations of your doctor for the frequency of appointments.      [facility]  [address]  [address] [place] [address]   [place] [facility] [address]  [address] [place] [address]   [facility] [address]  [address] [place] [address]      [facility] [address] [place] [address]   [facility]  [address] [place] [address]     [facility] [address]  [address] [place] [address]     [facility]  [address] [place], [place] [address]   [facility] [address]  [address] [place] [address]   [facility] [address]  [address] [place]  [address]     [facility]  [address] [place] [address]    [facility]  [address]  [place], [place] [address]   [facility]  [address] [place] [address]       [facility] [address]  [address] [place] [address]   [facility]  ([facility])  [address] [place] [address]   [facility]  [address] [facility]    [place], [place] [address]     Appointment Schedulin406 [phone]   Device Clinic: [phone] (this is Not an emergency number)    Frequent Questions from ICD Patients    What Does a Shock Feel Like?  - Many patients describe a shock as a hard punch or kick to the chest. The discomfort is over  quickly and does not remain like an actual punch or kick.    What Should I Do If I Get a Shock?  - Remain calm -your ICD is working.  - If you are feeling well after your shock, call your ICD doctor. If you are not feeling well, call  911 and seek emergency treatment.  - Family members should also know what to do if you get a shock.  - Establish an emergency plan with family members and friends. Instruction in CPR is  recommended.    Can I Travel?  -Always carry your ICD identification card.  -Ask to be hand searched at security checkpoints, such as at the airport or your local courthouse.    Hand wands are not a substitute for a “pat down” search. Surveillance wands may be used  below your waist and on your shoes.  -If you are making an extended trip to another city, your ICD doctor may recommend a  physician or hospital that you can contact.    Can I Drive?  -Your doctor will specify any driving restrictions.    Other Points:  -Always notify healthcare providers, such as dentists and podiatrists, that you have an ICD. If  any surgery is planned for you, the anesthesia personnel must know in advance about your ICD.    Other Sources of Information:  Device Clinic Nurses: [phone]  Patient Services Department of the  of your ICD  The Internet holds  an abundance of information. Some internet sites of interest:  www.medtronic.com  www.Merlin.Reality Mobile  www.Rollerscoot.com  www.heartrhythm.Reality Mobile  www.[email or web address] Mobile    This info is a general resource. It is not meant to replace your health care provider’s advice.  Ask your doctor or health care team any questions. Always follow their instructions.

## 2025-03-12 NOTE — CARE PLAN
Problem: Safety - Adult  Goal: Free from fall injury  Outcome: Progressing     Problem: Nutrition  Goal: Nutrient intake appropriate for maintaining nutritional needs  Outcome: Progressing     Problem: Fall/Injury  Goal: Verbalize understanding of risk factor reduction measures to prevent injury from fall in the home  Outcome: Progressing  Goal: Use assistive devices by end of the shift  Outcome: Progressing  Goal: Pace activities to prevent fatigue by end of the shift  Outcome: Progressing     Problem: Pain - Adult  Goal: Verbalizes/displays adequate comfort level or baseline comfort level  Outcome: Met     Problem: Fall/Injury  Goal: Not fall by end of shift  Outcome: Met  Goal: Be free from injury by end of the shift  Outcome: Met  Goal: Verbalize understanding of personal risk factors for fall in the hospital  Outcome: Met   The patient's goals for the shift include to have a good night rest    The clinical goals for the shift include patient will have a HR >80 throught the shift    Over the shift, the patient did  make progress toward the following goals.

## 2025-03-12 NOTE — PROGRESS NOTES
03/12/25 0634   Discharge Planning   Expected Discharge Disposition Home     Patient admitted for dizziness, lightheadedness and weakness that has been going on for several months, patient is currently wearing a holter monitor. Patient states it has gotten worse since echo, patient has wpw syndrome.    Cards following- Plan for permanent dual chamber PPM with Dr. Salvador  3/12/25    ADOD few days  BARRIERS pacemaker placement, symptom improvement, med clearance  DISPO most likely home no needs, may need to assess for need for HHC closer to discharge date?    1200- patient location in cath lab. Will need to assess for home going needs tomorrow. Needs cards clearance for discharge.

## 2025-03-13 ENCOUNTER — APPOINTMENT (OUTPATIENT)
Dept: CARDIOLOGY | Facility: HOSPITAL | Age: 75
DRG: 242 | End: 2025-03-13
Payer: MEDICARE

## 2025-03-13 ENCOUNTER — APPOINTMENT (OUTPATIENT)
Dept: RADIOLOGY | Facility: HOSPITAL | Age: 75
DRG: 242 | End: 2025-03-13
Payer: MEDICARE

## 2025-03-13 LAB
ALBUMIN SERPL BCP-MCNC: 3.6 G/DL (ref 3.4–5)
ANION GAP SERPL CALC-SCNC: 10 MMOL/L (ref 10–20)
ATRIAL RATE: 61 BPM
ATRIAL RATE: 87 BPM
BUN SERPL-MCNC: 18 MG/DL (ref 6–23)
CALCIUM SERPL-MCNC: 9.2 MG/DL (ref 8.6–10.3)
CHLORIDE SERPL-SCNC: 104 MMOL/L (ref 98–107)
CO2 SERPL-SCNC: 27 MMOL/L (ref 21–32)
CREAT SERPL-MCNC: 0.71 MG/DL (ref 0.5–1.05)
EGFRCR SERPLBLD CKD-EPI 2021: 88 ML/MIN/1.73M*2
ERYTHROCYTE [DISTWIDTH] IN BLOOD BY AUTOMATED COUNT: 13 % (ref 11.5–14.5)
GLUCOSE SERPL-MCNC: 104 MG/DL (ref 74–99)
HCT VFR BLD AUTO: 40.7 % (ref 36–46)
HGB BLD-MCNC: 13.4 G/DL (ref 12–16)
HOLD SPECIMEN: NORMAL
MAGNESIUM SERPL-MCNC: 2.01 MG/DL (ref 1.6–2.4)
MCH RBC QN AUTO: 31.2 PG (ref 26–34)
MCHC RBC AUTO-ENTMCNC: 32.9 G/DL (ref 32–36)
MCV RBC AUTO: 95 FL (ref 80–100)
NRBC BLD-RTO: 0 /100 WBCS (ref 0–0)
P AXIS: -3 DEGREES
PHOSPHATE SERPL-MCNC: 2.8 MG/DL (ref 2.5–4.9)
PLATELET # BLD AUTO: 213 X10*3/UL (ref 150–450)
POTASSIUM SERPL-SCNC: 4.2 MMOL/L (ref 3.5–5.3)
PR INTERVAL: 224 MS
PR INTERVAL: 96 MS
Q ONSET: 181 MS
Q ONSET: 239 MS
QRS COUNT: 20 BEATS
QRS COUNT: 29 BEATS
QRS DURATION: 138 MS
QRS DURATION: 210 MS
QT INTERVAL: 134 MS
QT INTERVAL: 286 MS
QTC CALCULATION(BAZETT): 228 MS
QTC CALCULATION(BAZETT): 410 MS
QTC FREDERICIA: 191 MS
QTC FREDERICIA: 364 MS
R AXIS: -23 DEGREES
R AXIS: 261 DEGREES
RBC # BLD AUTO: 4.29 X10*6/UL (ref 4–5.2)
SODIUM SERPL-SCNC: 137 MMOL/L (ref 136–145)
T AXIS: 0 DEGREES
T AXIS: 135 DEGREES
T OFFSET: 306 MS
T OFFSET: 324 MS
VENTRICULAR RATE: 124 BPM
VENTRICULAR RATE: 175 BPM
VIT B12 SERPL-MCNC: >2000 PG/ML (ref 211–911)
WBC # BLD AUTO: 7.3 X10*3/UL (ref 4.4–11.3)

## 2025-03-13 PROCEDURE — 2060000001 HC INTERMEDIATE ICU ROOM DAILY

## 2025-03-13 PROCEDURE — 85027 COMPLETE CBC AUTOMATED: CPT | Performed by: NURSE PRACTITIONER

## 2025-03-13 PROCEDURE — 99233 SBSQ HOSP IP/OBS HIGH 50: CPT | Performed by: STUDENT IN AN ORGANIZED HEALTH CARE EDUCATION/TRAINING PROGRAM

## 2025-03-13 PROCEDURE — 2500000001 HC RX 250 WO HCPCS SELF ADMINISTERED DRUGS (ALT 637 FOR MEDICARE OP): Performed by: NURSE PRACTITIONER

## 2025-03-13 PROCEDURE — 71046 X-RAY EXAM CHEST 2 VIEWS: CPT

## 2025-03-13 PROCEDURE — 83735 ASSAY OF MAGNESIUM: CPT | Performed by: SURGERY

## 2025-03-13 PROCEDURE — 82607 VITAMIN B-12: CPT | Mod: AHULAB | Performed by: INTERNAL MEDICINE

## 2025-03-13 PROCEDURE — 36415 COLL VENOUS BLD VENIPUNCTURE: CPT | Performed by: SURGERY

## 2025-03-13 PROCEDURE — 71046 X-RAY EXAM CHEST 2 VIEWS: CPT | Performed by: RADIOLOGY

## 2025-03-13 PROCEDURE — 2500000004 HC RX 250 GENERAL PHARMACY W/ HCPCS (ALT 636 FOR OP/ED): Performed by: NURSE PRACTITIONER

## 2025-03-13 PROCEDURE — 99232 SBSQ HOSP IP/OBS MODERATE 35: CPT | Performed by: INTERNAL MEDICINE

## 2025-03-13 PROCEDURE — 70450 CT HEAD/BRAIN W/O DYE: CPT

## 2025-03-13 PROCEDURE — 80069 RENAL FUNCTION PANEL: CPT | Performed by: SURGERY

## 2025-03-13 RX ADMIN — DOXYCYCLINE HYCLATE 100 MG: 100 TABLET, COATED ORAL at 09:40

## 2025-03-13 RX ADMIN — ASPIRIN 81 MG: 81 TABLET, COATED ORAL at 09:40

## 2025-03-13 RX ADMIN — DOXYCYCLINE HYCLATE 100 MG: 100 TABLET, COATED ORAL at 20:36

## 2025-03-13 RX ADMIN — ATORVASTATIN CALCIUM 10 MG: 10 TABLET, FILM COATED ORAL at 09:40

## 2025-03-13 RX ADMIN — ENOXAPARIN SODIUM 40 MG: 40 INJECTION SUBCUTANEOUS at 20:36

## 2025-03-13 ASSESSMENT — PAIN SCALES - GENERAL
PAINLEVEL_OUTOF10: 0 - NO PAIN

## 2025-03-13 ASSESSMENT — PAIN - FUNCTIONAL ASSESSMENT
PAIN_FUNCTIONAL_ASSESSMENT: 0-10
PAIN_FUNCTIONAL_ASSESSMENT: 0-10

## 2025-03-13 NOTE — CARE PLAN
The patient's goals for the shift include      The clinical goals for the shift include pt will remain HDS throughout the shift      Problem: Safety - Adult  Goal: Free from fall injury  Outcome: Progressing     Problem: Nutrition  Goal: Nutrient intake appropriate for maintaining nutritional needs  Outcome: Progressing     Problem: Fall/Injury  Goal: Verbalize understanding of risk factor reduction measures to prevent injury from fall in the home  Outcome: Progressing  Goal: Use assistive devices by end of the shift  Outcome: Progressing  Goal: Pace activities to prevent fatigue by end of the shift  Outcome: Progressing

## 2025-03-13 NOTE — PROGRESS NOTES
"Subjective Data:  Stable with some confusion last night resolving.     Objective Data:  Last Recorded Vitals:  Vitals:    25 1605 25 1700 25 1702 25 1800   BP: (!) 137/103      BP Location: Left arm      Patient Position: Lying      Pulse: 84 87  84   Resp: 19      Temp: 36.8 °C (98.2 °F)      TempSrc: Temporal      SpO2: 97%      Weight:   (!) 43.8 kg (96 lb 9 oz)    Height:   1.549 m (5' 0.98\")      Medical Gas Therapy: None (Room air)  Medical Gas Delivery Method: Nasal cannula (1.5L)  Weight  Av.5 kg (95 lb 14.8 oz)  Min: 40.8 kg (89 lb 15.2 oz)  Max: 45.4 kg (100 lb)    LABS:  CMP:  Results from last 7 days   Lab Units 25  0538 25  0536 25  0834   SODIUM mmol/L 137 136 134*   POTASSIUM mmol/L 4.2 4.0 4.4   CHLORIDE mmol/L 104 104 102   CO2 mmol/L 27 25 26   ANION GAP mmol/L 10 11 10   BUN mg/dL 18 16 16   CREATININE mg/dL 0.71 0.74 0.83   EGFR mL/min/1.73m*2 88 84 73   MAGNESIUM mg/dL 2.01 2.19 1.90   ALBUMIN g/dL 3.6 3.7 3.8   ALT U/L  --   --  45   AST U/L  --   --  39   BILIRUBIN TOTAL mg/dL  --   --  0.8     CBC:  Results from last 7 days   Lab Units 25  0537 25  0536 25  0834   WBC AUTO x10*3/uL 7.3 7.8 8.0   HEMOGLOBIN g/dL 13.4 13.6 11.7*   HEMATOCRIT % 40.7 41.4 35.5*   PLATELETS AUTO x10*3/uL 213 223 218   MCV fL 95 95 94     COAG:   Results from last 7 days   Lab Units 25  0834   INR  1.0     ABO: No results found for: \"ABO\"  HEME/ENDO:  Results from last 7 days   Lab Units 25  0834   TSH mIU/L 2.05      CARDIAC:   Results from last 7 days   Lab Units 25  0834   TROPHS ng/L 8   BNP pg/mL 233*             Last I/O:  No intake or output data in the 24 hours ending 25    Net IO Since Admission: -360 mL [25]      Imaging Results:  ECG 12 lead    Result Date: 3/11/2025  Sinus rhythm with 2nd degree AV block with 2:1 AV conduction Possible Left atrial enlargement RSR' or QR pattern in V1 suggests right " ventricular conduction delay Anterolateral infarct (cited on or before 04-AUG-2015) Abnormal ECG When compared with ECG of 10-DORIAN-2025 09:22, Significant changes have occurred See ED provider note for full interpretation and clinical correlation Confirmed by Yudi Hamilton ([id number]) on 3/11/2025 12:30:05 PM    XR chest 1 view    Result Date: 3/11/2025  Interpreted By:  Ashtyn Brooks, STUDY: XR CHEST 1 VIEW;  3/11/2025 8:44 am   INDICATION: Signs/Symptoms:fatigue.   COMPARISON: 01/10/2025   ACCESSION NUMBER(S): [id number]   ORDERING CLINICIAN: ANDREA JOHN   FINDINGS: CARDIOMEDIASTINAL SILHOUETTE: Cardiomediastinal silhouette is normal in size and configuration.     LUNGS: Lungs are clear.   ABDOMEN: No remarkable upper abdominal findings.     BONES: No acute osseous changes.   There are bilateral breast implants with calcification of the fibrous capsules.       No acute cardiopulmonary process.   MACRO: None   Signed by: Ashtyn Brooks 3/11/2025 8:47 AM Dictation workstation:   [id number]          Past Cardiology Tests (Last 3 Years):  EKG:  Results for orders placed during the hospital encounter of 03/11/25    ECG 12 lead    Narrative  Sinus rhythm with 2nd degree AV block with 2:1 AV conduction  Possible Left atrial enlargement  RSR' or QR pattern in V1 suggests right ventricular conduction delay  Anterolateral infarct (cited on or before 04-AUG-2015)  Abnormal ECG  When compared with ECG of 10-DORIAN-2025 09:22,  Significant changes have occurred  See ED provider note for full interpretation and clinical correlation  Confirmed by Yudi Hamilton ([id number]) on 3/11/2025 12:30:05 PM      Results for orders placed during the hospital encounter of 01/10/25    ECG 12 lead    Narrative  Normal sinus rhythm  Right bundle branch block  Left anterior fascicular block   Bifascicular block   Minimal voltage criteria for LVH, may be normal variant ( R in aVL )  Lateral infarct (cited on or before  04-AUG-2015)  Abnormal ECG  When compared with ECG of 01-MAY-2023 11:36,  Left anterior fascicular block is now Present  Minimal criteria for Inferior infarct are no longer Present  Questionable change in initial forces of Lateral leads  See ED provider note for full interpretation and clinical correlation  Confirmed by Yudi Condon ([id number]) on 1/19/2025 9:26:46 PM      Results for orders placed in visit on 03/06/23    ECG 12 lead    Narrative  Test done    Echo:  Results for orders placed in visit on 03/07/25    Transthoracic Echo (TTE) Complete    Narrative  [facility], [address] [place] [address]  Tel [phone] and Fax [phone]    TRANSTHORACIC ECHOCARDIOGRAM REPORT      Patient Name:       TUNDE Alanis Physician:    [id number] Gutierrez Aleman MD  Study Date:         3/7/2025            Ordering Provider:    [id number] ALEMAN  MRN/PID:            [id number]            Fellow:  Accession#:         [id number]        Nurse:  Date of Birth/Age:  1950 / 74      Sonographer:          Elena Szymanski RCS  years  Gender assigned at  F                   Additional Staff:  Birth:  Height:             154.94 cm           Admit Date:  Weight:             45.36 kg            Admission Status:     Outpatient  BSA / BMI:          1.41 m2 / 18.89     Encounter#:           [id number]  kg/m2  Blood Pressure:     122/64 mmHg         Department Location:  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 Dyslipidemia, Renea-Parkinson-White syndrome;  Supraventricular tachycardia.    Study Detail: The following Echo studies were performed: 2D, M-Mode, Doppler and  color flow. Technically challenging study due to prominent  lung  artifact.      PHYSICIAN INTERPRETATION:  Left Ventricle: Left ventricular ejection fraction is normal, calculated by Marshall's biplane at 59%. There are no regional left ventricular wall motion abnormalities. The left ventricular cavity size is normal. There is mildly increased septal and normal posterior left ventricular wall thickness. There is left ventricular concentric remodeling. Spectral Doppler shows a Grade I (impaired relaxation pattern) of left ventricular diastolic filling with normal left atrial filling pressure.  Left Atrium: The left atrium is mildly dilated.  Right Ventricle: The right ventricle is normal in size. There is normal right ventricular global systolic function.  Right Atrium: The right atrium is normal in size.  Aortic Valve: The aortic valve is trileaflet. There is no evidence of aortic valve regurgitation. The peak instantaneous gradient of the aortic valve is 6 mmHg.  Mitral Valve: The mitral valve is mildly thickened. There is mild mitral valve regurgitation.  Tricuspid Valve: The tricuspid valve is structurally normal. There is mild tricuspid regurgitation.  Pulmonic Valve: The pulmonic valve is structurally normal. There is no indication of pulmonic valve regurgitation.  Pericardium: There is no pericardial effusion noted.  Aorta: The aortic root is normal. The Ao Sinus is 3.50 cm. The Asc Ao is 3.80 cm.  Pulmonary Artery: The tricuspid regurgitant velocity is 2.88 m/s, and with an estimated right atrial pressure of 3 mmHg, the estimated pulmonary artery pressure is mildly elevated with the RVSP at 36.1 mmHg.  Systemic Veins: The inferior vena cava appears normal in size, with IVC inspiratory collapse less than 50%.  In comparison to the previous echocardiogram(s): Compared with study dated 4/21/2023, no significant change.      CONCLUSIONS:  1. Left ventricular ejection fraction is normal, calculated by Marshall's biplane at 59%.  2. Spectral Doppler shows a Grade I (impaired  relaxation pattern) of left ventricular diastolic filling with normal left atrial filling pressure.  3. The left atrium is mildly dilated.  4. The inferior vena cava appears normal in size, with IVC inspiratory collapse less than 50%.    QUANTITATIVE DATA SUMMARY:    2D MEASUREMENTS:          Normal Ranges:  LAs:             2.63 cm  (2.7-4.0cm)  RVIDd:           1.96 cm  (0.9-3.6cm)  IVSd:            1.14 cm  (0.6-1.1cm)  LVPWd:           0.78 cm  (0.6-1.1cm)  LVIDd:           3.45 cm  (3.9-5.9cm)  LVIDs:           2.39 cm  LV Mass Index:   68 g/m2  LVEDV Index:     40 ml/m2  LV % FS          30.7 %      LEFT ATRIUM:                  Normal Ranges:  LA Vol A4C:        36.3 ml    (22+/-6mL/m2)  LA Vol A2C:        41.5 ml  LA Vol BP:         40.5 ml  LA Vol Index A4C:  25.8ml/m2  LA Vol Index A2C:  29.5 ml/m2  LA Vol Index BP:   28.8 ml/m2  LA Area A4C:       13.7 cm2  LA Area A2C:       15.3 cm2  LA Major Axis A4C: 4.4 cm  LA Major Axis A2C: 4.8 cm  LA Volume Index:   28.8 ml/m2  LA Vol A4C:        33.9 ml      RIGHT ATRIUM:                 Normal Ranges:  RA Vol A4C:        24.0 ml    (8.3-19.5ml)  RA Vol Index A4C:  17.1 ml/m2  RA Area A4C:       10.9 cm2  RA Major Axis A4C: 4.2 cm      AORTA MEASUREMENTS:         Normal Ranges:  Ao Sinus, d:        3.50 cm (2.1-3.5cm)  Asc Ao, d:          3.80 cm (2.1-3.4cm)      LV SYSTOLIC FUNCTION:  Normal Ranges:  EF-A4C View:    59 % (>=55%)  EF-A2C View:    60 %  EF-Biplane:     59 %  LV EF Reported: 59 %      LV DIASTOLIC FUNCTION:             Normal Ranges:  MV Peak E:             1.13 m/s    (0.7-1.2 m/s)  MV Peak A:             0.53 m/s    (0.42-0.7 m/s)  E/A Ratio:             2.12        (1.0-2.2)  MV A Dur:              171.27 msec  PulmV Sys Jerome:         48.35 cm/s  PulmV Salas Jerome:        60.71 cm/s  PulmV S/D Jerome:         0.80  PulmV A Revs Jerome:      25.44 cm/s  PulmV A Revs Dur:      117.98 msec      MITRAL VALVE:          Normal Ranges:  MV DT:        231 msec  (150-240msec)      MITRAL INSUFFICIENCY:            Normal Ranges:  PISA Radius:          0.4 cm  MR Alias Jerome:         92.1 cm/s  MR Flow Rt:           80.53 ml/s      AORTIC VALVE:           Normal Ranges:  AoV Vmax:      1.20 m/s (<=1.7m/s)  AoV Peak P.8 mmHg (<20mmHg)  LVOT Max Jerome:  1.09 m/s (<=1.1m/s)  LVOT VTI:      27.26 cm  LVOT Diameter: 2.12 cm  (1.8-2.4cm)  AoV Area,Vmax: 3.18 cm2 (2.5-4.5cm2)      RIGHT VENTRICLE:  RV Basal 3.10 cm  RV Mid   2.00 cm  RV Major 5.6 cm  TAPSE:   22.0 mm  RV s'    0.13 m/s      TRICUSPID VALVE/RVSP:          Normal Ranges:  Peak TR Velocity:     2.88 m/s  RV Syst Pressure:     36 mmHg  (< 30mmHg)  IVC Diam:             2.10 cm      PULMONIC VALVE:          Normal Ranges:  PV Accel Time:  88 msec  (>120ms)  PV Max Jerome:     0.7 m/s  (0.6-0.9m/s)  PV Max P.2 mmHg      PULMONARY VEINS:  PulmV A Revs Dur: 117.98 msec  PulmV A Revs Jerome: 25.44 cm/s  PulmV Salas Jerome:   60.71 cm/s  PulmV S/D Jerome:    0.80  PulmV Sys Jerome:    48.35 cm/s      AORTA:  Asc Ao Diam 3.75 cm      [id number] Chyna Aleman MD  Electronically signed on 3/7/2025 at 6:01:16 PM        ** Final **    Ejection Fractions:  LV EF   Date/Time Value Ref Range Status   2025 02:49 PM 59 %      Cath:  No results found for this or any previous visit.    Stress Test:  Results for orders placed in visit on 23    Nuclear Stress Test    Narrative  Interpreted By:  TENISHA FLORENCE MD and FATUMA BAXTER MD  MRN: [id number]  Patient Name: TUNDE ALEX    STUDY:  CARDIAC STRESS/REST INJECTION; CARDIAC STRESS/REST (MYOCARDIAL  PERFUSION/MIBI); PART 2 STRESS OR REST (NO CHARGE);  2023 1:28  pm; 2023 1:29 pm; 2023 1:30 pm    INDICATION:  chest apin Other chest pain R07.89: Atypical chest pain.    COMPARISON:  None.    ACCESSION NUMBER(S):  [id number]; [id number]; [id number]    ORDERING CLINICIAN:  CHYNA ALEMAN    TECHNIQUE:  DIVISION OF NUCLEAR MEDICINE  STRESS MYOCARDIAL PERFUSION SCAN, ONE DAY  PROTOCOL    The patient received an intravenous injection of 10.5 mCi of Tc-99m  Myoview and resting emission tomographic (SPECT) images of the  myocardium were acquired. The patient then underwent treadmill stress  exercising to 96 % of MPHR and achieving 7.2 METS.  At peak stress an  additional injection of  36 mCi of Tc-99m Myoview was administered  and stress phase SPECT images of the myocardium were then acquired.  This acquisition included ECG-gated images to assess and quantify  ventricular function. Low dose CT was acquired for attenuation  correction.    FINDINGS:  Both stress and rest studies demonstrate grossly normal perfusion  throughout the left ventricle.    The left ventricle is normal in size.    Gated images demonstrate normal LV wall motion with an LV EF  estimated at greater than 65%.    Attenuation correction CT images are below the threshold for a  diagnostic exam.    Impression  Negative myocardial perfusion study without evidence of inducible  ischemia or prior infarction.    The left ventricle is normal in size.    Normal LV wall motion with a post-stress LV EF estimated at greater  than 65%.    I personally reviewed the images/study and I agree with the findings  as stated.  This study was interpreted at [facility], [place], [place].    Cardiac Imaging:  No results found for this or any previous visit.      Inpatient Medications:  Scheduled medications   Medication Dose Route Frequency    aspirin  81 mg oral Daily    atorvastatin  10 mg oral Daily    doxycylcine  100 mg oral q12h LARA    [Held by provider] enoxaparin  40 mg subcutaneous q24h    influenza  0.5 mL intramuscular During hospitalization    sodium chloride  500 mL intravenous Once    vancomycin  1,000 mg intravenous Once     PRN medications   Medication    acetaminophen    Or    acetaminophen    Or    acetaminophen    melatonin    ondansetron    Or    ondansetron    polyethylene glycol      Continuous Medications   Medication Dose Last Rate     Physical Exam:  GENERAL: alert, cooperative, pleasant, in no acute distress  SKIN: warm, dry  NECK: no JVD  CARDIAC: Bradycardia no murmurs  PULMONARY: Normal respiratory efforts, lungs clear to auscultation bilaterally.  ABDOMEN: soft, nondistended  EXTREMITIES: no lower extremity edema  NEURO: Alert and oriented x 3.  Grossly normal.  Moves all 4 extremities.     Assessment/Plan   Abena Shaw is a 74 y.o. female with past medical history significant for dyslipidemia, prior smoking history, fatigue, esophagitis, memory loss, osteopenia, vitamin D deficiency, history of Renea-Parkinson-White status post ablation in 2014.  Presented with lightheadedness.  Cardiology is consulted for bradycardia, second-degree AV block.     Home CV meds: Aspirin  81 mg daily Atorvastatin 10 mg daily Metoprolol succinate 50 mg daily     #High-grade AV block  Patient presented with intermittent episodes of dizziness and lightheadedness.  ECG showed 2 :1 AV block heart rate 30s.  Blood pressure acceptable at this time.  Reports last dose of metoprolol was yesterday morning around 9 AM.  TSH is normal.  Recent echocardiogram with normal LV systolic function.  -Successful PPM 3/12/25  -No Metoprolol on discharge     #Dyslipidemia  Continue outpatient statin     #History of Renea-Parkinson-White status post ablation    The patient will benefit from follow-up in Cardiology clinic within 4 weeks of discharge.        Thank you for allowing me to participate in their care.  Please feel free to call me with any further questions or concerns.    Gutierrez Willard MD, FACC, MELA FINN  Division of Cardiovascular Medicine  System Director, Nuclear Cardiology   Medical Director, [facility]   Clinical , [employer]  [email or web address]   Office:  [phone]

## 2025-03-13 NOTE — CONSULTS
"Nutrition Assessment Note  Nutrition Assessment      Reason for Assessment: Admission nursing screening    Admitted for symptomatic bradycardia.  MST score of 3 for weight loss and  appetite.  No significant weight loss identified.  She has weighed 45 kg or less for past 12 months. Ate 100% of dinner 3/11, no other meals charted to review.  POC is to discharge home when medically ready.  No nutrition intervention is indicated at this time. Please reconsult if there are changes in patient status & nutrition therapy is warranted.     History:  Energy Intake: Fair 50-75 %  Food and Nutrient History: patient n/a, physican speaking with her.  Reported decreased appetite on MST and had c/o dizziness for past month.    Anthropometrics:  Height: 154.9 cm (5' 0.98\")  Weight: (!) 43.8 kg (96 lb 9 oz)  BMI (Calculated): 18.25  Weight History / % Weight Change: stable weight pattern for past year.  Significant Weight Loss: No  IBW/kg (Dietitian Calculated): 47.7 kg     Amputation Calculations:  BMI Amputation Adjustment: No    Dietary Orders   Adult diet Cardiac; 70 gm fat; 2 - 3 grams Sodium      Nutrition Interventions/Recommendations   Nutrition Prescription: Nutrition prescription for oral nutrition  Individualized Nutrition Prescription Provided for : Continue diet as ordered.  If intake consistantly <50%, offer Ensure Plus BID.  MD to manage IVF as indicated.    Food and/or Nutrient Delivery Interventions  Meals and Snacks: Fat-modified diet, Mineral-modified diet  Goal: intake meets >75% estimated nutrient needs.     Last Date of Nutrition Visit: 25  Nutrition Follow-Up Needed?: Dietitian to reassess per policy  Follow up Comment: MST-TR       "

## 2025-03-13 NOTE — PROGRESS NOTES
Abena Shaw is a 74 y.o. female on day 2 of admission presenting with Symptomatic bradycardia.    Plan: continues treatment s/p ppm placement, awaiting cxr this morning. Anticipate home this afternoon.  Disposition: Home with    Barrier: cxr, cardio clearance  ADOD:  today/tomorrow       03/13/25 1003   Discharge Planning   Expected Discharge Disposition Home       Amy Alarcon RN

## 2025-03-13 NOTE — NURSING NOTE
Rapid Response Nurse Note:     Abena Shaw is a 74 y.o. female on day 2 of admission presenting with Symptomatic bradycardia.    Rounded on patient due to recent rapid response, reviewed patient's chart and checked with bedside nurse for any questions or concerns. There were none voiced at this time.    The patient's current RADAR score is 0    BP (!) 129/109   Pulse 89   Temp 36.8 °C (98.2 °F) (Temporal)   Resp 19   Wt (!) 43.8 kg (96 lb 9 oz)   SpO2 97%     No signs/symptoms of distress at this time. Bedside nurse notified to escalate concerns if patient condition changes      Marlys Eugene RN

## 2025-03-13 NOTE — CARE PLAN
The patient's goals for the shift include      The clinical goals for the shift include patient will have a HR >80 throught the shift

## 2025-03-13 NOTE — PROGRESS NOTES
Abena Shaw is a 74 y.o. female     Patient continues to feel a little off  Discussed with patient's  who is concerned about the patient's memory loss which appears to have gotten worse over the last few days  Counseled the patient's  that because she is in a strange setting along with the recent anesthesia that might explain the worsening confusion  Will obtain a CT of the head to rule out any other etiologies    Review of Systems     Constitutional: no fever, no chills, not feeling poorly, not feeling tired   Cardiovascular: no chest pain   Respiratory: no cough, wheezing or shortness of breath a  Gastrointestinal: no abdominal pain, no constipation, no melena, no nausea, no diarrhea, no vomiting and no blood in stools.   Neurological: no headache,   All other systems have been reviewed and are negative for complaint.       Vitals:    03/13/25 1700   BP:    Pulse: 87   Resp:    Temp:    SpO2:         Scheduled medications  aspirin, 81 mg, oral, Daily  atorvastatin, 10 mg, oral, Daily  doxycylcine, 100 mg, oral, q12h LARA  [Held by provider] enoxaparin, 40 mg, subcutaneous, q24h  influenza, 0.5 mL, intramuscular, During hospitalization  sodium chloride, 500 mL, intravenous, Once  vancomycin, 1,000 mg, intravenous, Once      Continuous medications     PRN medications  PRN medications: acetaminophen **OR** acetaminophen **OR** acetaminophen, melatonin, ondansetron **OR** ondansetron, polyethylene glycol    Lab Review   Results from last 7 days   Lab Units 03/13/25  0537 03/12/25  0536 03/11/25  0834   WBC AUTO x10*3/uL 7.3 7.8 8.0   HEMOGLOBIN g/dL 13.4 13.6 11.7*   HEMATOCRIT % 40.7 41.4 35.5*   PLATELETS AUTO x10*3/uL 213 223 218     Results from last 7 days   Lab Units 03/13/25  0538 03/12/25  0536 03/11/25  0834   SODIUM mmol/L 137 136 134*   POTASSIUM mmol/L 4.2 4.0 4.4   CHLORIDE mmol/L 104 104 102   CO2 mmol/L 27 25 26   BUN mg/dL 18 16 16   CREATININE mg/dL 0.71 0.74 0.83   CALCIUM  mg/dL 9.2 9.0 9.2   PROTEIN TOTAL g/dL  --   --  6.2*   BILIRUBIN TOTAL mg/dL  --   --  0.8   ALK PHOS U/L  --   --  44   ALT U/L  --   --  45   AST U/L  --   --  39   GLUCOSE mg/dL 104* 90 93     Results from last 7 days   Lab Units 03/11/25  0834   TROPHS ng/L 8        CT head wo IV contrast   Final Result   1. No acute intracranial abnormality or mass effect.        2. Air-fluid levels located within the left maxillary sinus and right   sphenoid sinus could reflect acute sinusitis, correlate clinically.        I personally reviewed the images/study and I agree with the findings   as stated. This study was interpreted at [place], [place].        Signed by: Keyur Magallon 3/13/2025 12:49 PM   Dictation workstation:   [id number]      Cardiac device check - Inpatient         XR chest 2 views   Final Result   Stable left-sided cardiac pacemaker.        Underlying emphysema as described.        No significant or acute interval change.        MACRO:   None        Signed by: Robbie Vaz 3/13/2025 8:57 AM   Dictation workstation:   [id number]      Electrophysiology procedure   Final Result      Cardiac Catheterization Procedure   Final Result      XR chest 1 view   Final Result   No acute cardiopulmonary process.        MACRO:   None        Signed by: Ashtyn Brooks 3/11/2025 8:47 AM   Dictation workstation:   [id number]            Physical Exam    Constitutional   General appearance: Alert and in no acute distress.   Pulmonary   Respiratory assessment: No respiratory distress, normal respiratory rhythm and effort.    Auscultation of Lungs: Clear bilateral breath sounds.   Cardiovascular   Auscultation of heart: Apical pulse normal, heart rate and rhythm normal, normal S1 and S2, no murmurs and no pericardial rub.    Exam for edema: No peripheral edema.   Abdomen   Abdominal Exam: No bruits, normal bowel sounds, soft, non-tender, no abdominal mass palpated.    Liver and  Spleen exam: No hepato-splenomegaly.   Musculoskeletal     Inspection/palpation of joints, bones and muscles: No joint swelling. Normal movement of all extremities.   Neurologic   Cranial nerves: Nerves 2-12 were intact, no focal neuro defects.         Assessment/Plan      Acute encephalopathy  ?  Baseline dementia  CT head unremarkable  Will need outpatient workup with neurology    #Symptomatic bradycardia post pacemaker  Heart rates are better  But still feels a little off  We will get PT OT consult to assess balance    #Dyslipidemia  Continue statins

## 2025-03-14 ENCOUNTER — PHARMACY VISIT (OUTPATIENT)
Dept: PHARMACY | Facility: CLINIC | Age: 75
End: 2025-03-14
Payer: MEDICARE

## 2025-03-14 VITALS
TEMPERATURE: 98 F | RESPIRATION RATE: 16 BRPM | BODY MASS INDEX: 18.56 KG/M2 | DIASTOLIC BLOOD PRESSURE: 76 MMHG | HEIGHT: 61 IN | HEART RATE: 75 BPM | WEIGHT: 98.33 LBS | OXYGEN SATURATION: 100 % | SYSTOLIC BLOOD PRESSURE: 121 MMHG

## 2025-03-14 LAB
ALBUMIN SERPL BCP-MCNC: 3.3 G/DL (ref 3.4–5)
ANION GAP SERPL CALC-SCNC: 10 MMOL/L (ref 10–20)
BUN SERPL-MCNC: 19 MG/DL (ref 6–23)
CALCIUM SERPL-MCNC: 8.8 MG/DL (ref 8.6–10.3)
CHLORIDE SERPL-SCNC: 101 MMOL/L (ref 98–107)
CO2 SERPL-SCNC: 25 MMOL/L (ref 21–32)
CREAT SERPL-MCNC: 0.61 MG/DL (ref 0.5–1.05)
EGFRCR SERPLBLD CKD-EPI 2021: >90 ML/MIN/1.73M*2
ERYTHROCYTE [DISTWIDTH] IN BLOOD BY AUTOMATED COUNT: 12.7 % (ref 11.5–14.5)
GLUCOSE SERPL-MCNC: 114 MG/DL (ref 74–99)
HCT VFR BLD AUTO: 36.2 % (ref 36–46)
HGB BLD-MCNC: 12.5 G/DL (ref 12–16)
MAGNESIUM SERPL-MCNC: 1.72 MG/DL (ref 1.6–2.4)
MCH RBC QN AUTO: 31.6 PG (ref 26–34)
MCHC RBC AUTO-ENTMCNC: 34.5 G/DL (ref 32–36)
MCV RBC AUTO: 91 FL (ref 80–100)
NRBC BLD-RTO: 0 /100 WBCS (ref 0–0)
PHOSPHATE SERPL-MCNC: 3.7 MG/DL (ref 2.5–4.9)
PLATELET # BLD AUTO: 184 X10*3/UL (ref 150–450)
POTASSIUM SERPL-SCNC: 4 MMOL/L (ref 3.5–5.3)
RBC # BLD AUTO: 3.96 X10*6/UL (ref 4–5.2)
SODIUM SERPL-SCNC: 132 MMOL/L (ref 136–145)
WBC # BLD AUTO: 6.9 X10*3/UL (ref 4.4–11.3)

## 2025-03-14 PROCEDURE — 2500000001 HC RX 250 WO HCPCS SELF ADMINISTERED DRUGS (ALT 637 FOR MEDICARE OP): Performed by: NURSE PRACTITIONER

## 2025-03-14 PROCEDURE — 85027 COMPLETE CBC AUTOMATED: CPT | Performed by: NURSE PRACTITIONER

## 2025-03-14 PROCEDURE — 97161 PT EVAL LOW COMPLEX 20 MIN: CPT | Mod: GP | Performed by: PHYSICAL THERAPIST

## 2025-03-14 PROCEDURE — 83735 ASSAY OF MAGNESIUM: CPT | Performed by: NURSE PRACTITIONER

## 2025-03-14 PROCEDURE — 36415 COLL VENOUS BLD VENIPUNCTURE: CPT | Performed by: NURSE PRACTITIONER

## 2025-03-14 PROCEDURE — 99238 HOSP IP/OBS DSCHRG MGMT 30/<: CPT | Performed by: INTERNAL MEDICINE

## 2025-03-14 PROCEDURE — 97165 OT EVAL LOW COMPLEX 30 MIN: CPT | Mod: GO

## 2025-03-14 PROCEDURE — 84100 ASSAY OF PHOSPHORUS: CPT | Performed by: NURSE PRACTITIONER

## 2025-03-14 RX ORDER — ACETAMINOPHEN 325 MG/1
650 TABLET ORAL EVERY 4 HOURS PRN
Start: 2025-03-14

## 2025-03-14 RX ADMIN — ASPIRIN 81 MG: 81 TABLET, COATED ORAL at 08:54

## 2025-03-14 RX ADMIN — ATORVASTATIN CALCIUM 10 MG: 10 TABLET, FILM COATED ORAL at 08:53

## 2025-03-14 RX ADMIN — DOXYCYCLINE HYCLATE 100 MG: 100 TABLET, COATED ORAL at 08:54

## 2025-03-14 ASSESSMENT — COGNITIVE AND FUNCTIONAL STATUS - GENERAL
MOBILITY SCORE: 23
DAILY ACTIVITIY SCORE: 20
HELP NEEDED FOR BATHING: A LITTLE
PERSONAL GROOMING: A LITTLE
DAILY ACTIVITIY SCORE: 24
MOBILITY SCORE: 24
DRESSING REGULAR LOWER BODY CLOTHING: A LITTLE
TOILETING: A LITTLE
CLIMB 3 TO 5 STEPS WITH RAILING: A LITTLE

## 2025-03-14 ASSESSMENT — PAIN SCALES - GENERAL
PAINLEVEL_OUTOF10: 0 - NO PAIN

## 2025-03-14 ASSESSMENT — ACTIVITIES OF DAILY LIVING (ADL)
BATHING_ASSISTANCE: STAND BY
ADL_ASSISTANCE: INDEPENDENT
ADL_ASSISTANCE: INDEPENDENT

## 2025-03-14 ASSESSMENT — PAIN - FUNCTIONAL ASSESSMENT
PAIN_FUNCTIONAL_ASSESSMENT: 0-10

## 2025-03-14 NOTE — PROGRESS NOTES
Occupational Therapy    Evaluation    Patient Name: Abena Shaw  MRN: [id number]  Department: [place] [address] ICU  [facility]  Today's Date: 3/14/2025  Time Calculation  Start Time: 0932  Stop Time: 0948  Time Calculation (min): 16 min        Assessment:  OT Assessment: Pt presents close to baseline in self care and fxnl mob. Pt will have 24/7 sup at dc and is safe to return home at this time. No acute OT needed, no OT needs at dc  Prognosis: Good  Barriers to Discharge Home: No anticipated barriers  Evaluation/Treatment Tolerance: Patient tolerated treatment well  Medical Staff Made Aware: Yes  End of Session Communication: Bedside nurse  End of Session Patient Position: Up in chair, Alarm on  Prognosis: Good  Barriers to Discharge: None  Evaluation/Treatment Tolerance: Patient tolerated treatment well  Medical Staff Made Aware: Yes  Strengths: Ability to acquire knowledge, Attitude of self, Housing layout, Premorbid level of function  Barriers to Participation: Comorbidities  Plan:  No Skilled OT: Safe to return home  OT Frequency: OT eval only  OT Discharge Recommendations: No OT needed after discharge  OT Recommended Transfer Status: Stand by assist, Assist of 1  OT - OK to Discharge: Yes (per POC)       Subjective   Current Problem:  1. Symptomatic bradycardia  Case Request Cath Lab: Temporary Pacemaker Insertion    Case Request Cath Lab: Temporary Pacemaker Insertion    Cardiac Catheterization Procedure    Cardiac Catheterization Procedure    Case Request EP Lab: PPM IMPLANT DUAL    Case Request EP Lab: PPM IMPLANT DUAL    Electrophysiology procedure    Electrophysiology procedure      2. Status post placement of cardiac pacemaker  Cardiac device check - Inpatient    Cardiac device check - Inpatient    doxycycline (Vibramycin) 100 mg capsule      3. Other specified heart block  Cardiac Catheterization Procedure        General:  General  Reason for Referral: 73 y/o F presented to ed with dizziness,  lightheadedness and weakness that has been going on for several months . S/p pacemaker placement due to bradycardia.  Referred By: Justina Mccracken MD  Past Medical History Relevant to Rehab:   Past Medical History:   Diagnosis Date    Anxiety     Esophagitis     H/O paroxysmal supraventricular tachycardia 06/21/2024    Hyperlipidemia     Other specified noninflammatory disorders of vulva and perineum 04/17/2017    Vulvar lesion    Prediabetes     Reactive airway disease ([facility]HS-HCC)     WPW (Renea-Parkinson-White syndrome)     s/p ablation 2014     Family/Caregiver Present: Yes  Caregiver Feedback:  present and supportive  Prior to Session Communication: Bedside nurse  Patient Position Received: Alarm on, Up in chair  Preferred Learning Style: kinesthetic, auditory, verbal, visual  General Comment: pleasant and cooperative with OT eval, eager to go home  Precautions:  Medical Precautions: Fall precautions, Cardiac precautions (new pacemaker)     Date/Time Vitals Session Patient Position Pulse Resp SpO2 BP MAP (mmHg)    03/14/25 1000 --  --  73  --  --  --  --     03/14/25 1100 --  --  73  --  --  --  --           Pain:  Pain Assessment  0-10 (Numeric) Pain Score: 0 - No pain    Objective   Cognition:  Orientation Level: Oriented X4 (oriented but requires increased time and is mildly forgetful)  Insight: Within function limits  Impulsive: Within functional limits     Home Living:  Type of Home: House  Lives With: Spouse  Home Adaptive Equipment: None  Home Layout: Two level (first floor set up)  Home Access: Stairs to enter with rails  Entrance Stairs-Rails: Both  Entrance Stairs-Number of Steps: 5  Bathroom Shower/Tub: Walk-in shower  Bathroom Toilet: Handicapped height  Bathroom Equipment: None  Prior Function:  Level of [place]: Independent with ADLs and functional transfers, Independent with homemaking with ambulation  Receives Help From: Family  ADL Assistance: Independent  Homemaking Assistance:  Independent  Ambulatory Assistance: Independent  Prior Function Comments: pt sits with mother in law most days until 12 pm (has dementia) 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  reports will have 24/7 sup for a few weeks at [facility]  IADL History:  Current License: Yes  ADL:  Eating Assistance: Independent  Grooming Assistance: Stand by  Grooming Deficit: Steadying, Supervision/safety  Bathing Assistance: Stand by  Bathing Deficit: Steadying, Supervision/safety  UE Dressing Assistance: Modified independent (Device)  LE Dressing Assistance: Stand by  LE Dressing Deficit: Steadying, Supervision/safety  Toileting Assistance with Device: Stand by  Toileting Deficit: Steadying, Supervison/safety  Activity Tolerance:     Bed Mobility/Transfers: Transfers  Transfer: Yes  Transfer 1  Transfer From 1: Chair with arms to  Transfer to 1: Stand  Technique 1: Sit to stand, Stand to sit  Transfer Level of Assistance 1: Close supervision  Transfers 2  Transfer From 2: Stand to  Transfer to 2: Toilet  Technique 2: Sit to stand, Stand to sit  Transfer Level of Assistance 2: Close supervision  Trials/Comments 2: with grab bar    Functional Mobility:  Functional Mobility  Functional Mobility Performed: Yes  Functional Mobility 1  Surface 1: Level tile  Device 1: No device  Assistance 1: Close supervision  Comments 1: min HH distances in hospital room and bathroom, no LOB  Sitting Balance:  Static Sitting Balance  Static Sitting-Balance Support: Feet supported  Static Sitting-Level of Assistance: Independent  Standing Balance:  Static Standing Balance  Static Standing-Balance Support: No upper extremity supported  Static Standing-Level of Assistance: Independent  Dynamic Standing Balance  Dynamic Standing-Balance Support: No upper extremity supported  Dynamic Standing-Level of Assistance: Close supervision  Dynamic Standing-Balance: Turning  Dynamic Standing-Comments: furniture walking     Sensation:  Light Touch: No  apparent deficits  Sharp/Dull: No apparent deficits    Coordination:  Movements are Fluid and Coordinated: Yes   Hand Function:  Gross Grasp: Functional  Coordination: Functional    Outcome Measures:[facility] Daily Activity  Putting on and taking off regular lower body clothing: A little  Bathing (including washing, rinsing, drying): A little  Putting on and taking off regular upper body clothing: None  Toileting, which includes using toilet, bedpan or urinal: A little  Taking care of personal grooming such as brushing teeth: A little  Eating Meals: None  Daily Activity - Total Score: 20      Education Documentation  Body Mechanics, taught by Oriana Toro OT at 3/14/2025 11:20 AM.  Learner: Patient, Significant Other  Readiness: Acceptance  Method: Explanation  Response: Verbalizes Understanding    Precautions, taught by Oriana Toro OT at 3/14/2025 11:20 AM.  Learner: Patient, Significant Other  Readiness: Acceptance  Method: Explanation  Response: Verbalizes Understanding    ADL Training, taught by Oriana Toro OT at 3/14/2025 11:20 AM.  Learner: Patient, Significant Other  Readiness: Acceptance  Method: Explanation  Response: Verbalizes Understanding    Education Comments  No comments found.

## 2025-03-14 NOTE — DISCHARGE SUMMARY
Discharge Diagnosis  Symptomatic bradycardia    Issues Requiring Follow-Up  Follow-up with cardiology  Follow-up with PCP    Test Results Pending At Discharge  Pending Labs       No current pending labs.            Hospital Course  Was admitted with increasing lethargy lightheadedness and dizziness and symptomatic bradycardia  Underwent a pacemaker placement for hide great AV block  Did well postop but had some increasing confusion  We initiated workup for dementia  Patient does have memory loss to begin with  CT head is unremarkable  Recommend outpatient neurology follow-up and workup for dementia  Discharged in stable condition    Pertinent Physical Exam At Time of Discharge  Physical Exam    Home Medications     Medication List      START taking these medications     doxycycline 100 mg capsule; Commonly known as: Vibramycin; Take 1   capsule (100 mg) by mouth every 12 hours for 14 doses. Take with a full   glass of water and do not lie down for at least 30 minutes after.     ASK your doctor about these medications     aspirin 81 mg EC tablet   atorvastatin 10 mg tablet; Commonly known as: Lipitor; Take 1 tablet (10   mg) by mouth once daily.   calcium carbonate-vitamin D3 500 mg-5 mcg (200 unit) tablet   CALCIUM ORAL   cholecalciferol 50 mcg (2,000 unit) capsule; Commonly known as: Vitamin   D-3   cyanocobalamin (vitamin B-12) 2,500 mcg tablet, sublingual SL tablet   metoprolol succinate XL 50 mg 24 hr tablet; Commonly known as:   Toprol-XL; Take 1 tablet (50 mg) by mouth once daily.   pyridoxine 100 mg tablet; Commonly known as: Vitamin B-6; Take 1 tablet   (100 mg) by mouth once daily.       Outpatient Follow-Up  Future Appointments   Date Time Provider Department Center   3/27/2025  8:45 AM Phillip Mendosa MD [id number] [facility]   4/15/2025  9:00 AM MINOFF [facility] CARDIAC DEVICE CLINIC [id number] [facility]   5/23/2025 10:00 AM Gutierrez BYRD MD [id number] [facility]       Justina Mccracken MD

## 2025-03-14 NOTE — CARE PLAN
The patient's goals for the shift include   Problem: Skin  Goal: Participates in plan/prevention/treatment measures  3/14/2025 0537 by Cassy Manzano RN  Flowsheets (Taken 3/14/2025 0537)  Participates in plan/prevention/treatment measures:   Increase activity/out of bed for meals   Elevate heels  3/14/2025 0536 by Cassy Manzano RN  Outcome: Progressing  Goal: Prevent/manage excess moisture  3/14/2025 0537 by Cassy Manzano RN  Flowsheets (Taken 3/14/2025 0537)  Prevent/manage excess moisture: Moisturize dry skin  3/14/2025 0536 by Cassy Manzano RN  Outcome: Progressing  Goal: Prevent/minimize sheer/friction injuries  3/14/2025 0537 by Cassy Manzano RN  Flowsheets (Taken 3/14/2025 0537)  Prevent/minimize sheer/friction injuries:   Complete micro-shifts as needed if patient unable. Adjust patient position to relieve pressure points, not a full turn   HOB 30 degrees or less   Increase activity/out of bed for meals   Turn/reposition every 2 hours/use positioning/transfer devices  3/14/2025 0536 by Cassy Manzano RN  Outcome: Progressing  Goal: Promote/optimize nutrition  3/14/2025 0537 by Cassy Manzano RN  Flowsheets (Taken 3/14/2025 0537)  Promote/optimize nutrition: Monitor/record intake including meals  3/14/2025 0536 by Cassy Manzano RN  Outcome: Progressing       The clinical goals for the shift include patient will remain HDS and safe throughout the shift    Over the shift, the patient did make progress toward the following goals.

## 2025-03-14 NOTE — CARE PLAN
Problem: Skin  Goal: Participates in plan/prevention/treatment measures  Outcome: Progressing  Goal: Prevent/manage excess moisture  Outcome: Progressing  Goal: Prevent/minimize sheer/friction injuries  Outcome: Progressing  Goal: Promote/optimize nutrition  Outcome: Progressing     The patient's goals for the shift include to have a good night rest    The clinical goals for the shift include patient will remain HDS and safe throughout the shift    Over the shift, the patient did make progress toward the following goals.

## 2025-03-14 NOTE — PROGRESS NOTES
Physical Therapy    Physical Therapy Evaluation & D/C    Patient Name: Abena Shaw  MRN: [id number]  Department: [place] [address] ICU  [facility]  Today's Date: 3/14/2025   Time Calculation  Start Time: 1014  Stop Time: 1024  Time Calculation (min): 10 min    Assessment/Plan   PT Assessment  Barriers to Discharge Home: No anticipated barriers  Evaluation/Treatment Tolerance: Patient tolerated treatment well  Medical Staff Made Aware: Yes  Strengths: Ability to acquire knowledge, Attitude of self, Housing layout, Premorbid level of function, Support and attitude of living partners, Support of extended family/friends  Barriers to Participation: Comorbidities  End of Session Communication: Bedside nurse  Assessment Comment: Pt appears to be close to her baseline level of function and is independent with all assessed mobility tasks; scored 26/28 on Tinetti gait and balance test putting her at no risk for falls; no further acute or post-acute PT needs identified at this time; will discontinue PT services.   End of Session Patient Position: Up in chair, Alarm on (needs in reach; spouse present)  IP OR SWING BED PT PLAN  Inpatient or Swing Bed: Inpatient  PT Plan  PT Plan: PT Eval only  PT Eval Only Reason: At baseline function  PT Frequency: PT eval only  PT Discharge Recommendations: No further acute PT, No PT needed after discharge  PT Recommended Transfer Status: Independent  PT - OK to Discharge: Yes    Subjective   General Visit Information:  General  Reason for Referral: 73 y/o F who had been wearing a Holter monitor to ED with dizziness, lightheadedness and weakness for several months; damitted with symptomatic bradycardia and high degree AV block; is s/p temporary PPM via RIJ.  Referred By: Justina Mccracken MD  Past Medical History Relevant to Rehab:   Past Medical History:   Diagnosis Date    Anxiety     Esophagitis     H/O paroxysmal supraventricular tachycardia 06/21/2024    Hyperlipidemia     Other  specified noninflammatory disorders of vulva and perineum 04/17/2017    Vulvar lesion    Prediabetes     Reactive airway disease ([facility])     WPW (Renea-Parkinson-White syndrome)     s/p ablation 2014     Past Surgical History:   Procedure Laterality Date    BREAST SURGERY      augmentation    CARDIAC ELECTROPHYSIOLOGY PROCEDURE N/A 3/11/2025    Procedure: Temporary Pacemaker Insertion;  Surgeon: Carl B Gillombardo, MD;  Location: [facility] Cardiac Cath Lab;  Service: Cardiovascular;  Laterality: N/A;    CARDIAC ELECTROPHYSIOLOGY PROCEDURE N/A 3/12/2025    Procedure: PPM IMPLANT DUAL;  Surgeon: Charles Salvador MD;  Location: [facility] Cardiac Cath Lab;  Service: Electrophysiology;  Laterality: N/A;    CARDIAC ELECTROPHYSIOLOGY STUDY AND ABLATION      CHOLECYSTECTOMY  10/15/2015    Cholecystectomy Laparoscopic       Family/Caregiver Present: Yes  Caregiver Feedback: spouse present and supportive  Prior to Session Communication: Bedside nurse  Patient Position Received: Up in chair, On cart  Preferred Learning Style: auditory, verbal, kinesthetic  General Comment: No barriers noted to mobility in chart. Pt has been up walking with nursing staff as well as spouse; is agreeable to PT eval.  Home Living:  Home Living  Type of Home: House  Lives With: Spouse  Home Adaptive Equipment: Cane  Home Layout: Two level, Laundry in basement, Full bath main level, Able to live on main level with bedroom/bathroom  Home Access: Stairs to enter with rails  Entrance Stairs-Rails: Both  Entrance Stairs-Number of Steps: 5  Bathroom Shower/Tub: Walk-in shower  Bathroom Toilet: Handicapped height  Bathroom Equipment: None  Home Living Comments: Pt does not go upstairs or to the basement.  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     Objective   Pain:  Pain Assessment  Pain Assessment: 0-10  0-10 (Numeric) Pain Score: 0 - No pain  Cognition:  Cognition  Overall Cognitive Status: Within Functional Limits  Orientation Level: Oriented X4 (intermitted confusion/forgetful at times)  Attention: Within Functional Limits  Safety/Judgement: Within Functional Limits  Insight: Mild  Impulsive: Within functional limits  Processing Speed: Delayed (mildly)    General Assessments:  General Observation  General Observation: +tele    Activity Tolerance  Endurance: Tolerates 10 - 20 min exercise with multiple rests  Early Mobility/Exercise Safety Screen: Proceed with mobilization - No exclusion criteria met    Sensation  Light Touch: No apparent deficits        Perception  Inattention/Neglect: Appears intact      Coordination  Movements are Fluid and Coordinated: Yes    Postural Control  Postural Control: Within Functional Limits    Static Sitting Balance  Static Sitting-Balance Support: Feet supported, No upper extremity supported (in chair)  Static Sitting-Level of Assistance: Independent  Dynamic Sitting Balance  Dynamic Sitting-Balance Support: No upper extremity supported, Feet supported (in chair)  Dynamic Sitting-Level of Assistance: Independent  Dynamic Sitting-Balance: Forward lean (LE motor assessment)    Static Standing Balance  Static Standing-Balance Support: No upper extremity supported  Static " Standing-Level of Assistance: Independent  Dynamic Standing Balance  Dynamic Standing-Balance Support: No upper extremity supported  Dynamic Standing-Level of Assistance: Independent  Dynamic Standing-Balance: Turning  Functional Assessments:  Bed Mobility  Bed Mobility: No (pt reports no trouble getting in/out of or positioning herself in bed.)    Transfers  Transfer: Yes  Transfer 1  Transfer From 1: Chair with arms to  Transfer to 1: Stand  Technique 1: Sit to stand, Stand to sit  Transfer Level of Assistance 1: Distant supervision    Ambulation/Gait Training  Ambulation/Gait Training Performed: Yes  Ambulation/Gait Training 1  Surface 1: Level tile  Device 1: No device  Assistance 1: Distant supervision  Quality of Gait 1:  (grossly WFL gait; no LOB or unsteadiness)  Comments/Distance (ft) 1: 250'x1    Stairs  Stairs: Yes  Stairs  Rails 1: Right  Device 1: Railing  Assistance 1: Close supervision  Comment/Number of Steps 1: 4 steps (reciprocal pattern)  Extremity/Trunk Assessments:  RUE   RUE : Within Functional Limits  LUE   LUE: Within Functional Limits  RLE   RLE : Within Functional Limits  LLE   LLE : Within Functional Limits  Outcome Measures:  [facility]  Turning from your back to your side while in a flat bed without using bedrails: None  Moving from lying on your back to sitting on the side of a flat bed without using bedrails: None  Moving to and from bed to chair (including a wheelchair): None  Standing up from a chair using your arms (e.g. wheelchair or bedside chair): None  To walk in hospital room: None  Climbing 3-5 steps with railing: A little  Basic Mobility - Total Score: 23    FSS-ICU  Ambulation: Walks >/ or equal to 150 feet independently  Rolling: Complete independence  Sitting: Complete independence  Transfer Sit-to-Stand: Complete independence  Transfer Supine-to-Sit: Complete independence  Total Score: 35      Early Mobility/Exercise Safety Screen: Proceed with mobilization - No  exclusion criteria met  ICU Mobility Scale: Walking independently without a gait aid [10]  Tinetti  Sitting Balance: Steady, safe  Arises: Able, uses arms to help  Attempts to Arise: Able to arise, one attempt  Immediate Standing Balance (First 5 Seconds): Steady without walker or other support  Standing Balance: Narrow stance without support  Nudged: Steady without walker or other support  Eyes Closed: Steady  Turned 360 Degrees: Steadiness: Steady  Turned 360 Degrees: Continuity of Steps: Continuous  Sitting Down: Uses arms or not a smooth motion  Balance Score: 14  Initiation of Gait: No hesitancy  Step Height: R Swing Foot: Right foot complete clears floor  Step Length: R Swing Foot: Passes left stance foot  Step Height: L Swing Foot: Left foot complete clears floor  Step Length: L Swing Foot: Passes right stance foot  Step Symmetry: Right and left step appear equal  Step Continuity: Steps appear continuous  Path: Straight without walking aid  Trunk: No sway, no flexion, no use of arms, no walking aid  Walking Time: Heels almost touching while walking  Gait Score: 12  Total Score: 26    Encounter Problems       Encounter Problems (Active)       Pain - Adult              Education Documentation  Mobility Training, taught by Joan Todd PT at 3/14/2025 11:26 AM.  Learner: Significant Other, Patient  Readiness: Acceptance  Method: Explanation  Response: Verbalizes Understanding  Comment: Pt educated on role of PT in acute setting, as well as PT plan to d/c services 2/2 pt at her baseline level of function. Pt and spouse in agreement with plan and do not feel that pt needs further PT after d/c.    Education Comments  No comments found.

## 2025-03-14 NOTE — PROGRESS NOTES
03/14/25 0811   Discharge Planning   Expected Discharge Disposition Home     Once med ready plan would be to discharge home, PT/OT eval placed, patient has complaints of feeling dizzy, need to establish if patient will need PT/OT HHC on discharge, I will continue to monitor for discharge planing and home going needs.

## 2025-03-14 NOTE — DOCUMENTATION CLARIFICATION NOTE
PATIENT:               TUNDE ALEX  ACCT #:                  [id number]  MRN:                       [id number]  :                       1950  ADMIT DATE:       3/11/2025 7:57 AM  DISCH DATE:  RESPONDING PROVIDER #:        [id number]          PROVIDER RESPONSE TEXT:    Metabolic Encephalopathy 2/2 bradycardia, surgical invention (PPM placement) with underlying dementia    CDI QUERY TEXT:    Clarification    Instruction:    Based on your assessment of the patient and the clinical information, please provide the requested documentation by clicking on the appropriate radio button and enter any additional information if prompted.    Question: Please further clarify the type of Encephalopathy as    When answering this query, please exercise your independent professional judgment. The fact that a question is being asked, does not imply that any particular answer is desired or expected.    The patient's clinical indicators include:  Clinical Information: 73 y/o female presented with dizziness, lightheadedness, fatigue. Admitted with symptomatic bradycardia, complete AV block. Underwent dual chamber PPM insertion.    Clinical Indicators:  3/13 Cardiology progress note: ?Stable with some confusion last night resolving?  3/13 Attending progress note: ?Acute encephalopathy  ? Baseline dementia  CT head unremarkable?  Nursing notes ?Oriented x 4? ?Intermittent confusion? ?confused? GCS 15, 14    Treatment: monitoring    Risk Factors: age, recent procedure, hospital stay  Options provided:  -- Metabolic Encephalopathy 2/2 bradycardia, surgical invention (PPM placement) with underlying dementia  -- Metabolic Encephalopathy 2/2 bradycardia, surgical invention (PPM placement)  -- Other - I will add my own diagnosis  -- Refer to Clinical Documentation Reviewer    Query created by: Merced Rashid on 3/14/2025 8:57 AM      Electronically signed by:  SONAM WILLOUGHBY MD 3/14/2025 9:30 AM

## 2025-03-17 ENCOUNTER — PATIENT OUTREACH (OUTPATIENT)
Dept: PRIMARY CARE | Facility: CLINIC | Age: 75
End: 2025-03-17
Payer: MEDICARE

## 2025-03-17 NOTE — PROGRESS NOTES
Discharge Facility:  [facility]   Discharge Diagnosis:   Symptomatic bradycardia, pacemaker insertion      Admission Date: 3/11/25   Discharge Date:  3/14/25     PCP Appointment Date:  3/27/25   Specialist Appointment Date:  4/15/25 cardio   Hospital Encounter and Summary Linked: Yes      Issues Requiring Follow-Up  Follow-up with cardiology  Follow-up with PCP       Wrap Up  Wrap Up Additional Comments: \This CM spoke with pts spouse via phone. Spouse reports pt doing well at home since discharge. New meds reviewed. Spouse reports they have picked up all new meds and have no questions at this time. spouse declines need for HHC. Spouse states pt is re-gaining her strength.  Spouse aware of my availability for non-emergent concerns. Contact info provided. (3/17/2025  1:56 PM)    Medications  Medications reviewed with patient/caregiver?: Yes (CM spoke with spouse) (3/17/2025  1:56 PM)  Is the patient having any side effects they believe may be caused by any medication additions or changes?: No (3/17/2025  1:56 PM)  Does the patient have all medications ordered at discharge?: Yes (3/17/2025  1:56 PM)  Prescription Comments: New scripts given at discharge : DOXYCYCLINE (3/17/2025  1:56 PM)  Is the patient taking all medications as directed (includes completed medication regime)?: Yes (3/17/2025  1:56 PM)  Care Management Interventions: Provided patient education (3/17/2025  1:56 PM)  Medication Comments: Pt denies problems obtaining or affording medication. No medication-related issues identified (3/17/2025  1:56 PM)    Appointments  Does the patient have a primary care provider?: Yes (3/17/2025  1:56 PM)  Care Management Interventions: Verified appointment date/time/provider (3/17/2025  1:56 PM)  Has the patient kept scheduled appointments due by today?: Yes (3/17/2025  1:56 PM)  Care Management Interventions: Advised patient to keep appointment (3/17/2025  1:56 PM)    Self Management  What is the home health agency?:  [facility] (3/17/2025  1:56 PM)  What Durable Medical Equipment (DME) was ordered?: N/A (3/17/2025  1:56 PM)    Patient Teaching  Does the patient have access to their discharge instructions?: Yes (3/17/2025  1:56 PM)  Care Management Interventions: Reviewed instructions with patient (3/17/2025  1:56 PM)  What is the patient's perception of their health status since discharge?: Improving (3/17/2025  1:56 PM)  Is the patient/caregiver able to teach back the hierarchy of who to call/visit for symptoms/problems? PCP, Specialist, Home Health nurse, Urgent Care, ED, 911: Yes (3/17/2025  1:56 PM)

## 2025-03-20 LAB
ATRIAL RATE: 61 BPM
ATRIAL RATE: 87 BPM
P AXIS: -3 DEGREES
PR INTERVAL: 224 MS
PR INTERVAL: 96 MS
Q ONSET: 181 MS
Q ONSET: 239 MS
QRS COUNT: 20 BEATS
QRS COUNT: 29 BEATS
QRS DURATION: 138 MS
QRS DURATION: 210 MS
QT INTERVAL: 134 MS
QT INTERVAL: 286 MS
QTC CALCULATION(BAZETT): 228 MS
QTC CALCULATION(BAZETT): 410 MS
QTC FREDERICIA: 191 MS
QTC FREDERICIA: 364 MS
R AXIS: -23 DEGREES
R AXIS: 261 DEGREES
T AXIS: 0 DEGREES
T AXIS: 135 DEGREES
T OFFSET: 306 MS
T OFFSET: 324 MS
VENTRICULAR RATE: 124 BPM
VENTRICULAR RATE: 175 BPM

## 2025-03-25 PROCEDURE — 93248 EXT ECG>7D<15D REV&INTERPJ: CPT | Performed by: INTERNAL MEDICINE

## 2025-03-27 ENCOUNTER — APPOINTMENT (OUTPATIENT)
Dept: PRIMARY CARE | Facility: CLINIC | Age: 75
End: 2025-03-27
Payer: MEDICARE

## 2025-03-27 VITALS
BODY MASS INDEX: 20.03 KG/M2 | HEIGHT: 60 IN | DIASTOLIC BLOOD PRESSURE: 84 MMHG | SYSTOLIC BLOOD PRESSURE: 148 MMHG | HEART RATE: 83 BPM | WEIGHT: 102 LBS

## 2025-03-27 DIAGNOSIS — I45.5 OTHER SPECIFIED HEART BLOCK: ICD-10-CM

## 2025-03-27 DIAGNOSIS — R42 LIGHTHEADEDNESS: ICD-10-CM

## 2025-03-27 DIAGNOSIS — R93.1 ELEVATED CORONARY ARTERY CALCIUM SCORE: ICD-10-CM

## 2025-03-27 DIAGNOSIS — R00.1 SYMPTOMATIC BRADYCARDIA: Primary | ICD-10-CM

## 2025-03-27 DIAGNOSIS — E78.5 DYSLIPIDEMIA: ICD-10-CM

## 2025-03-27 DIAGNOSIS — Z95.0 S/P PLACEMENT OF CARDIAC PACEMAKER: ICD-10-CM

## 2025-03-27 PROCEDURE — 1159F MED LIST DOCD IN RCRD: CPT | Performed by: FAMILY MEDICINE

## 2025-03-27 PROCEDURE — 99495 TRANSJ CARE MGMT MOD F2F 14D: CPT | Performed by: FAMILY MEDICINE

## 2025-03-27 PROCEDURE — 1123F ACP DISCUSS/DSCN MKR DOCD: CPT | Performed by: FAMILY MEDICINE

## 2025-03-27 PROCEDURE — 1111F DSCHRG MED/CURRENT MED MERGE: CPT | Performed by: FAMILY MEDICINE

## 2025-03-27 ASSESSMENT — ENCOUNTER SYMPTOMS
OCCASIONAL FEELINGS OF UNSTEADINESS: 0
DEPRESSION: 0
LOSS OF SENSATION IN FEET: 0

## 2025-03-27 ASSESSMENT — PATIENT HEALTH QUESTIONNAIRE - PHQ9
SUM OF ALL RESPONSES TO PHQ9 QUESTIONS 1 AND 2: 0
1. LITTLE INTEREST OR PLEASURE IN DOING THINGS: NOT AT ALL
1. LITTLE INTEREST OR PLEASURE IN DOING THINGS: NOT AT ALL
SUM OF ALL RESPONSES TO PHQ9 QUESTIONS 1 AND 2: 0
2. FEELING DOWN, DEPRESSED OR HOPELESS: NOT AT ALL
2. FEELING DOWN, DEPRESSED OR HOPELESS: NOT AT ALL

## 2025-03-29 PROBLEM — Z95.0 S/P PLACEMENT OF CARDIAC PACEMAKER: Status: ACTIVE | Noted: 2025-03-29

## 2025-03-29 PROBLEM — I45.5 OTHER SPECIFIED HEART BLOCK: Status: ACTIVE | Noted: 2025-03-29

## 2025-03-29 ASSESSMENT — ENCOUNTER SYMPTOMS
NEUROLOGICAL NEGATIVE: 1
PSYCHIATRIC NEGATIVE: 1
RESPIRATORY NEGATIVE: 1
FEVER: 0
NAUSEA: 0
GASTROINTESTINAL NEGATIVE: 1
ALLERGIC/IMMUNOLOGIC NEGATIVE: 1
MUSCULOSKELETAL NEGATIVE: 1
CHILLS: 0
HEMATOLOGIC/LYMPHATIC NEGATIVE: 1
VOMITING: 0
ENDOCRINE NEGATIVE: 1

## 2025-03-29 NOTE — PROGRESS NOTES
"Patient: Abena Shaw  : 1950  PCP: Phillip Mendosa MD  MRN: [id number]  Program: No linked episodes    Admit date: 3/10  Discharge date:3/14  Discharge DX: bradycardia s/p pacemaker    Abena Shaw is a 74 y.o. female presenting today for follow-up after being discharged from the hospital 13 days ago. The main problem requiring admission was bradycardia. The discharge summary and/or Transitional Care Management documentation was reviewed. Medication reconciliation was performed as indicated via the \"Ruperto as Reviewed\" timestamp.     Abena Shaw was contacted by Transitional Care Management services two days after her discharge. This encounter and supporting documentation was reviewed.    The complexity of medical decision making for this patient's transitional care is high.    HPI   Patient presenete dto lynette  presyncope HR 30   Hospital course copied:   Was admitted with increasing lethargy lightheadedness and dizziness and symptomatic bradycardia  Underwent a pacemaker placement for hide great AV block  Did well postop but had some increasing confusion  We initiated workup for dementia  Patient does have memory loss to begin with  CT head is unremarkable  Recommend outpatient neurology follow-up and workup for dementia  Discharged in stable condition  Labs copied:    Imaging copied:     Review of Systems   Constitutional:  Negative for chills and fever.   HENT: Negative.     Respiratory: Negative.     Cardiovascular:  Negative for chest pain.        Pacemaker in place   Gastrointestinal: Negative.  Negative for nausea and vomiting.   Endocrine: Negative.    Genitourinary: Negative.    Musculoskeletal: Negative.    Skin: Negative.  Negative for rash.   Allergic/Immunologic: Negative.    Neurological: Negative.    Hematological: Negative.    Psychiatric/Behavioral: Negative.     All other systems reviewed and are negative.      Physical Exam  GENERAL: alert, cooperative, " pleasant, in no acute distress  SKIN: warm, dry inciosn healed  NECK: no JVD  CARDIAC: Pacemaker in place.   PULMONARY: Normal respiratory efforts, lungs clear to auscultation bilaterally.  ABDOMEN: soft, nondistended  EXTREMITIES: no lower extremity edema  NEURO: Alert and oriented x 3.  Grossly normal.  Moves all 4 extremities.   Problem List Items Addressed This Visit       Dyslipidemia    Current Assessment & Plan     Check lipid panel continue medications continue healthy eating work out  150 minutes a week           Lightheadedness    Current Assessment & Plan     Feels much better.          Elevated coronary artery calcium score    Symptomatic bradycardia - Primary    Current Assessment & Plan     S/p pacemaker feels much better.          Other specified heart block    S/P placement of cardiac pacemaker    Current Assessment & Plan     Follow up with EP              Family History   Problem Relation Name Age of Onset    Breast cancer Mother         No data recorded    No follow-ups on file.

## 2025-04-01 ENCOUNTER — PATIENT OUTREACH (OUTPATIENT)
Dept: PRIMARY CARE | Facility: CLINIC | Age: 75
End: 2025-04-01
Payer: MEDICARE

## 2025-04-01 NOTE — PROGRESS NOTES
Call regarding appt. with PCP on 3/27/25 after hospitalization.  At time of outreach call the patients spouse  feels as if the pts condition has improved  since last visit.  Spouse reports pt I getting stronger. No questions or concerns at this time. Contact info provided.

## 2025-04-07 ENCOUNTER — HOSPITAL ENCOUNTER (OUTPATIENT)
Dept: CARDIOLOGY | Facility: CLINIC | Age: 75
Discharge: HOME | End: 2025-04-07
Payer: MEDICARE

## 2025-04-07 DIAGNOSIS — R00.1 BRADYCARDIA, UNSPECIFIED: ICD-10-CM

## 2025-04-07 PROCEDURE — 93296 REM INTERROG EVL PM/IDS: CPT

## 2025-04-15 ENCOUNTER — HOSPITAL ENCOUNTER (OUTPATIENT)
Dept: CARDIOLOGY | Facility: CLINIC | Age: 75
Discharge: HOME | End: 2025-04-15
Payer: MEDICARE

## 2025-04-15 DIAGNOSIS — R00.1 SYMPTOMATIC BRADYCARDIA: ICD-10-CM

## 2025-04-15 DIAGNOSIS — Z95.0 PRESENCE OF CARDIAC PACEMAKER: ICD-10-CM

## 2025-04-15 DIAGNOSIS — I47.10 SUPRAVENTRICULAR TACHYCARDIA, PAROXYSMAL (CMS-HCC): ICD-10-CM

## 2025-04-15 DIAGNOSIS — Z95.0 CARDIAC PACEMAKER IN SITU: ICD-10-CM

## 2025-04-15 DIAGNOSIS — R00.1 BRADYCARDIA, UNSPECIFIED: Primary | ICD-10-CM

## 2025-04-15 PROCEDURE — 93280 PM DEVICE PROGR EVAL DUAL: CPT | Performed by: INTERNAL MEDICINE

## 2025-04-15 PROCEDURE — 93280 PM DEVICE PROGR EVAL DUAL: CPT

## 2025-04-29 ENCOUNTER — OFFICE VISIT (OUTPATIENT)
Dept: CARDIOLOGY | Facility: CLINIC | Age: 75
End: 2025-04-29
Payer: MEDICARE

## 2025-04-29 VITALS
DIASTOLIC BLOOD PRESSURE: 72 MMHG | BODY MASS INDEX: 18.77 KG/M2 | WEIGHT: 102 LBS | SYSTOLIC BLOOD PRESSURE: 118 MMHG | OXYGEN SATURATION: 100 % | HEART RATE: 80 BPM | HEIGHT: 62 IN

## 2025-04-29 DIAGNOSIS — R06.09 DOE (DYSPNEA ON EXERTION): ICD-10-CM

## 2025-04-29 DIAGNOSIS — I47.10 SUPRAVENTRICULAR TACHYCARDIA, PAROXYSMAL (CMS-HCC): ICD-10-CM

## 2025-04-29 DIAGNOSIS — Z95.0 PRESENCE OF CARDIAC PACEMAKER: ICD-10-CM

## 2025-04-29 DIAGNOSIS — R55 NEAR SYNCOPE: ICD-10-CM

## 2025-04-29 DIAGNOSIS — R00.1 BRADYCARDIA, UNSPECIFIED: ICD-10-CM

## 2025-04-29 DIAGNOSIS — R00.1 SYMPTOMATIC BRADYCARDIA: Primary | ICD-10-CM

## 2025-04-29 DIAGNOSIS — E78.5 DYSLIPIDEMIA: ICD-10-CM

## 2025-04-29 DIAGNOSIS — I45.6 WOLFF-PARKINSON-WHITE SYNDROME: ICD-10-CM

## 2025-04-29 DIAGNOSIS — R42 DIZZINESS: ICD-10-CM

## 2025-04-29 DIAGNOSIS — I45.5 OTHER SPECIFIED HEART BLOCK: ICD-10-CM

## 2025-04-29 PROCEDURE — 1159F MED LIST DOCD IN RCRD: CPT | Performed by: STUDENT IN AN ORGANIZED HEALTH CARE EDUCATION/TRAINING PROGRAM

## 2025-04-29 PROCEDURE — 99214 OFFICE O/P EST MOD 30 MIN: CPT | Performed by: STUDENT IN AN ORGANIZED HEALTH CARE EDUCATION/TRAINING PROGRAM

## 2025-04-29 PROCEDURE — 1123F ACP DISCUSS/DSCN MKR DOCD: CPT | Performed by: STUDENT IN AN ORGANIZED HEALTH CARE EDUCATION/TRAINING PROGRAM

## 2025-04-29 PROCEDURE — G2211 COMPLEX E/M VISIT ADD ON: HCPCS | Performed by: STUDENT IN AN ORGANIZED HEALTH CARE EDUCATION/TRAINING PROGRAM

## 2025-04-29 PROCEDURE — 1036F TOBACCO NON-USER: CPT | Performed by: STUDENT IN AN ORGANIZED HEALTH CARE EDUCATION/TRAINING PROGRAM

## 2025-04-29 NOTE — PATIENT INSTRUCTIONS
Will plan to obtain carotid Dopplers relative obstructive carotid disease causing dizziness.    Please continue current cardiac medication occluding aspirin 81 mg daily and atorvastatin 10 mg daily.    Please follow-up with me in the cardiology clinic in the next 5 months.

## 2025-04-29 NOTE — PROGRESS NOTES
Follow-up     HPI:    Abena Shaw is a 74 y.o. female with pertinent history of dyslipidemia, prior smoking history, fatigue, esophagitis, memory loss, osteopenia, vitamin D deficiency, history of Renea-Parkinson-White status post ablation in 2014, preserved ejection fraction with impaired relaxation echo performed 4/21/2023, no clear ischemia nuclear stress test performed 5/1/2023, no obstructive carotid disease on Dopplers performed 5/25/2023, preserved LVEF of 59% with grade 1 diastolic dysfunction, mildly dilated left atrium on echo performed 3/7/2025, history of complete heart block status post [employer] dual-chamber pacemaker with Dr. Salvador implanted 3/12/2025 presents to cardiology clinic for follow-up after recent hospitalization.    We reviewed her recent hospital course.  She is accompanied by her  who has questions and provides history.  She is doing relatively well.   She notes an increase in energy level.  On April 5 she did have an episode of dizziness that sounds more like vertigo lasting about 30 seconds while at a park.  She did have a device interrogation performed that was unremarkable.  We did review her most recent device interrogation.  No exacerbating or relieving factors.  Patient denies chest pain and angina.  Pt denies orthopnea, and paroxysmal nocturnal dyspnea.  Pt denies worsening lower extremity edema.  Pt denies syncope.  No recent falls.  No fever or chills.  No cough.  No change in bowel or bladder habits.  No sick contacts.  No recent travel.    12 point review of systems including (Constitutional, Eyes, ENMT, Respiratory, Cardiac, Gastrointestinal, Neurological, Psychiatric, and Hematologic) was performed and is otherwise negative.    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    Past surgical history reviewed:   has a past surgical history that includes Cholecystectomy (10/15/2015); Cardiac electrophysiology study and ablation; Breast surgery; Cardiac electrophysiology procedure (N/A, 3/11/2025); and Cardiac electrophysiology procedure (N/A, 3/12/2025).    Social history reviewed:   reports that she quit smoking about 6 years ago. Her smoking use included cigarettes. She started smoking about 57 years ago. She has a 63.8 pack-year smoking history. She has been exposed to tobacco smoke. She has never used smokeless tobacco. She reports that she does not drink alcohol and does not use drugs.     Family history reviewed:    Family History   Problem Relation Name Age of Onset    Breast cancer Mother         Allergies reviewed: Patient has no known allergies.     Medications reviewed:   Current Outpatient Medications   Medication Instructions    acetaminophen (TYLENOL) 650 mg, oral, Every 4 hours PRN    aspirin 81 mg, Daily    atorvastatin (LIPITOR) 10 mg, oral, Daily    calcium carbonate-vitamin D3 500 mg-5 mcg (200 unit) tablet 1 tablet, Daily    CALCIUM ORAL Calcium TABS   Refills: 0       Active    cholecalciferol (Vitamin D-3) 50 mcg (2,000 unit) capsule Daily RT    cyanocobalamin, vitamin B-12, 2,500 mcg tablet, sublingual Daily RT    pyridoxine (VITAMIN B-6) 100 mg, oral, Daily        Vitals reviewed: Visit Vitals  /72   Pulse 80         Physical Exam:   General:  Patient is awake, alert, and oriented.  Patient is in no acute distress.  HEENT:  Pupils equal and reactive.  Normocephalic.  Moist mucosa.    Neck:  No thyromegaly.  Normal Jugular Venous Pressure.  Cardiovascular:  Regular rate and rhythm.  Normal S1 and S2.  1/6 SAMEER.  Permanent pacemaker in place.  Pulmonary:  Clear to auscultation bilaterally.  Abdomen:  Soft. Non-tender.   Non-distended.  Positive bowel  sounds.  Lower Extremities:  2+ pedal pulses. No LE edema.  Neurologic:  Cranial nerves intact.  No focal deficit.   Skin: Skin warm and dry, normal skin turgor.   Psychiatric: Normal affect.    Last Labs:  CBC -      Lab Results   Component Value Date    WBC 6.9 03/14/2025    HGB 12.5 03/14/2025    HCT 36.2 03/14/2025     03/14/2025        CMP-  Lab Results   Component Value Date    GLUCOSE 114 (H) 03/14/2025     (L) 03/14/2025    K 4.0 03/14/2025     03/14/2025    CO2 25 03/14/2025    ANIONGAP 10 03/14/2025    BUN 19 03/14/2025    CREATININE 0.61 03/14/2025    EGFR >90 03/14/2025    CALCIUM 8.8 03/14/2025    PHOS 3.7 03/14/2025    PROT 6.2 (L) 03/11/2025    ALBUMIN 3.3 (L) 03/14/2025    AST 39 03/11/2025    ALT 45 03/11/2025    ALKPHOS 44 03/11/2025    BILITOT 0.8 03/11/2025        LIPIDS-  Lab Results   Component Value Date    CHOL 182 05/13/2024    TRIG 68 05/13/2024    HDL 71.4 05/13/2024    CHHDL 2.5 05/13/2024    VLDL 14 05/13/2024        OTHERS-  Lab Results   Component Value Date    HGBA1C 5.8 12/05/2024     (H) 03/11/2025        I personally reviewed the patient's recent vitals, labs, medications, orders, EKGs, pertinent cardiac imaging/ echocardiography and ischemic evaluations including stress testing.    Assessment and Plan:    Abena Shaw is a 74 y.o. female with pertinent history of dyslipidemia, prior smoking history, fatigue, esophagitis, memory loss, osteopenia, vitamin D deficiency, history of Renea-Parkinson-White status post ablation in 2014, preserved ejection fraction with impaired relaxation echo performed 4/21/2023, no clear ischemia nuclear stress test performed 5/1/2023, no obstructive carotid disease on Dopplers performed 5/25/2023, preserved LVEF of 59% with grade 1 diastolic dysfunction, mildly dilated left atrium on echo performed 3/7/2025, history of complete heart block status post [employer] dual-chamber pacemaker with Dr. Salvador implanted  3/12/2025 presents to cardiology clinic for follow-up after recent hospitalization.  We reviewed her recent hospital course.  She is accompanied by her  who has questions and provides history.  She is doing relatively well.   She notes an increase in energy level.  On April 5 she did have an episode of dizziness that sounds more like vertigo lasting about 30 seconds while at a park.  She did have a device interrogation performed that was unremarkable.  We did review her most recent device interrogation.     Will plan to obtain carotid Dopplers relative obstructive carotid disease causing dizziness.    Please continue current cardiac medication occluding aspirin 81 mg daily and atorvastatin 10 mg daily.    Please follow-up with me in the cardiology clinic in the next 5 months.    Thank you for allowing me to participate in their care.  Please feel free to call me with any further questions or concerns.        Gutierrez Willard MD, FACC, MELA FINN  Division of Cardiovascular Medicine  System Director, Nuclear Cardiology   Medical Director, [facility] [place], [facility]   Clinical , [employer] [facility] [employer]  [email or web address]   Office:  [phone]

## 2025-05-01 ENCOUNTER — PATIENT OUTREACH (OUTPATIENT)
Dept: PRIMARY CARE | Facility: CLINIC | Age: 75
End: 2025-05-01
Payer: MEDICARE

## 2025-05-23 ENCOUNTER — APPOINTMENT (OUTPATIENT)
Dept: CARDIOLOGY | Facility: CLINIC | Age: 75
End: 2025-05-23
Payer: MEDICARE

## 2025-05-30 ENCOUNTER — HOSPITAL ENCOUNTER (OUTPATIENT)
Dept: VASCULAR MEDICINE | Facility: HOSPITAL | Age: 75
Discharge: HOME | End: 2025-05-30
Payer: MEDICARE

## 2025-05-30 DIAGNOSIS — R06.09 DOE (DYSPNEA ON EXERTION): ICD-10-CM

## 2025-05-30 DIAGNOSIS — I45.5 OTHER SPECIFIED HEART BLOCK: ICD-10-CM

## 2025-05-30 DIAGNOSIS — Z95.0 PRESENCE OF CARDIAC PACEMAKER: ICD-10-CM

## 2025-05-30 DIAGNOSIS — I45.6 WOLFF-PARKINSON-WHITE SYNDROME: ICD-10-CM

## 2025-05-30 DIAGNOSIS — R00.1 SYMPTOMATIC BRADYCARDIA: ICD-10-CM

## 2025-05-30 DIAGNOSIS — R00.1 BRADYCARDIA, UNSPECIFIED: ICD-10-CM

## 2025-05-30 DIAGNOSIS — R42 DIZZINESS: ICD-10-CM

## 2025-05-30 DIAGNOSIS — E78.5 DYSLIPIDEMIA: ICD-10-CM

## 2025-05-30 DIAGNOSIS — I47.10 SUPRAVENTRICULAR TACHYCARDIA, PAROXYSMAL: ICD-10-CM

## 2025-05-30 DIAGNOSIS — R55 NEAR SYNCOPE: ICD-10-CM

## 2025-05-30 PROCEDURE — 93880 EXTRACRANIAL BILAT STUDY: CPT | Performed by: INTERNAL MEDICINE

## 2025-05-30 PROCEDURE — 93880 EXTRACRANIAL BILAT STUDY: CPT

## 2025-06-06 ENCOUNTER — PATIENT OUTREACH (OUTPATIENT)
Dept: PRIMARY CARE | Facility: CLINIC | Age: 75
End: 2025-06-06
Payer: MEDICARE

## 2025-06-06 NOTE — PROGRESS NOTES
Final call.  CM called and spoke with pts spouse to address any final questions or concerns regarding hospitalization.  spouse reports PT doing well and has no concerns at this time.  Pts spouse  given my contact info  in the event questions should arise.

## 2025-06-12 ENCOUNTER — DOCUMENTATION (OUTPATIENT)
Dept: PRIMARY CARE | Facility: CLINIC | Age: 75
End: 2025-06-12
Payer: MEDICARE

## 2025-06-12 NOTE — PROGRESS NOTES
Pt has met the target of no readmission for 90 days post discharge and is graduated from the TCM program at this time.

## 2025-06-30 ENCOUNTER — TELEPHONE (OUTPATIENT)
Dept: PRIMARY CARE | Facility: CLINIC | Age: 75
End: 2025-06-30
Payer: MEDICARE

## 2025-06-30 DIAGNOSIS — R05.3 CHRONIC COUGH: ICD-10-CM

## 2025-06-30 DIAGNOSIS — R93.1 ELEVATED CORONARY ARTERY CALCIUM SCORE: ICD-10-CM

## 2025-06-30 RX ORDER — ATORVASTATIN CALCIUM 10 MG/1
10 TABLET, FILM COATED ORAL DAILY
Qty: 100 TABLET | Refills: 1 | Status: SHIPPED | OUTPATIENT
Start: 2025-06-30 | End: 2025-07-01 | Stop reason: SDUPTHER

## 2025-07-01 DIAGNOSIS — R93.1 ELEVATED CORONARY ARTERY CALCIUM SCORE: ICD-10-CM

## 2025-07-01 DIAGNOSIS — R05.3 CHRONIC COUGH: ICD-10-CM

## 2025-07-01 RX ORDER — ATORVASTATIN CALCIUM 10 MG/1
10 TABLET, FILM COATED ORAL DAILY
Qty: 100 TABLET | Refills: 1 | Status: SHIPPED | OUTPATIENT
Start: 2025-07-01 | End: 2026-08-05

## 2025-07-15 ENCOUNTER — HOSPITAL ENCOUNTER (OUTPATIENT)
Dept: CARDIOLOGY | Facility: CLINIC | Age: 75
Discharge: HOME | End: 2025-07-15
Payer: MEDICARE

## 2025-07-15 DIAGNOSIS — I47.10 SUPRAVENTRICULAR TACHYCARDIA, PAROXYSMAL: ICD-10-CM

## 2025-07-15 DIAGNOSIS — R00.1 SYMPTOMATIC BRADYCARDIA: ICD-10-CM

## 2025-07-15 DIAGNOSIS — Z95.0 PRESENCE OF CARDIAC PACEMAKER: Primary | ICD-10-CM

## 2025-07-15 DIAGNOSIS — Z95.0 CARDIAC PACEMAKER IN SITU: ICD-10-CM

## 2025-07-15 DIAGNOSIS — R00.1 BRADYCARDIA, UNSPECIFIED: ICD-10-CM

## 2025-07-15 PROCEDURE — 93280 PM DEVICE PROGR EVAL DUAL: CPT

## 2025-07-15 PROCEDURE — 93280 PM DEVICE PROGR EVAL DUAL: CPT | Performed by: INTERNAL MEDICINE

## 2025-07-29 ENCOUNTER — APPOINTMENT (OUTPATIENT)
Dept: PRIMARY CARE | Facility: CLINIC | Age: 75
End: 2025-07-29
Payer: MEDICARE

## 2025-07-29 VITALS
BODY MASS INDEX: 17.74 KG/M2 | OXYGEN SATURATION: 96 % | WEIGHT: 96.4 LBS | HEART RATE: 79 BPM | DIASTOLIC BLOOD PRESSURE: 80 MMHG | HEIGHT: 62 IN | SYSTOLIC BLOOD PRESSURE: 122 MMHG

## 2025-07-29 DIAGNOSIS — E78.5 DYSLIPIDEMIA: ICD-10-CM

## 2025-07-29 DIAGNOSIS — H57.9: ICD-10-CM

## 2025-07-29 DIAGNOSIS — R93.1 ELEVATED CORONARY ARTERY CALCIUM SCORE: ICD-10-CM

## 2025-07-29 DIAGNOSIS — F17.210 NICOTINE DEPENDENCE, CIGARETTES, UNCOMPLICATED: ICD-10-CM

## 2025-07-29 DIAGNOSIS — E55.9 VITAMIN D DEFICIENCY: ICD-10-CM

## 2025-07-29 DIAGNOSIS — A31.0 MYCOBACTERIUM AVIUM COMPLEX (MULTI): ICD-10-CM

## 2025-07-29 DIAGNOSIS — F41.9 ANXIETY: ICD-10-CM

## 2025-07-29 DIAGNOSIS — R73.03 PREDIABETES: ICD-10-CM

## 2025-07-29 DIAGNOSIS — R41.3 MEMORY LOSS: ICD-10-CM

## 2025-07-29 DIAGNOSIS — Z00.00 ROUTINE GENERAL MEDICAL EXAMINATION AT HEALTH CARE FACILITY: Primary | ICD-10-CM

## 2025-07-29 PROCEDURE — 99214 OFFICE O/P EST MOD 30 MIN: CPT | Performed by: FAMILY MEDICINE

## 2025-07-29 PROCEDURE — 1159F MED LIST DOCD IN RCRD: CPT | Performed by: FAMILY MEDICINE

## 2025-07-29 PROCEDURE — G0439 PPPS, SUBSEQ VISIT: HCPCS | Performed by: FAMILY MEDICINE

## 2025-07-29 PROCEDURE — 1170F FXNL STATUS ASSESSED: CPT | Performed by: FAMILY MEDICINE

## 2025-07-29 PROCEDURE — G0444 DEPRESSION SCREEN ANNUAL: HCPCS | Performed by: FAMILY MEDICINE

## 2025-07-29 RX ORDER — AZITHROMYCIN 250 MG/1
TABLET, FILM COATED ORAL
Qty: 6 TABLET | Refills: 0 | Status: SHIPPED | OUTPATIENT
Start: 2025-07-29 | End: 2025-08-03

## 2025-07-30 LAB
25(OH)D3+25(OH)D2 SERPL-MCNC: 62 NG/ML (ref 30–100)
ALBUMIN SERPL-MCNC: 4.5 G/DL (ref 3.6–5.1)
ALP SERPL-CCNC: 46 U/L (ref 37–153)
ALT SERPL-CCNC: 22 U/L (ref 6–29)
ANION GAP SERPL CALCULATED.4IONS-SCNC: 10 MMOL/L (CALC) (ref 7–17)
AST SERPL-CCNC: 24 U/L (ref 10–35)
BILIRUB SERPL-MCNC: 1 MG/DL (ref 0.2–1.2)
BUN SERPL-MCNC: 11 MG/DL (ref 7–25)
CALCIUM SERPL-MCNC: 10.7 MG/DL (ref 8.6–10.4)
CHLORIDE SERPL-SCNC: 98 MMOL/L (ref 98–110)
CHOLEST SERPL-MCNC: 143 MG/DL
CHOLEST/HDLC SERPL: 1.8 (CALC)
CO2 SERPL-SCNC: 28 MMOL/L (ref 20–32)
CREAT SERPL-MCNC: 0.89 MG/DL (ref 0.6–1)
EGFRCR SERPLBLD CKD-EPI 2021: 67 ML/MIN/1.73M2
ERYTHROCYTE [DISTWIDTH] IN BLOOD BY AUTOMATED COUNT: 12.2 % (ref 11–15)
GLUCOSE SERPL-MCNC: 106 MG/DL (ref 65–139)
HCT VFR BLD AUTO: 44.9 % (ref 35–45)
HDLC SERPL-MCNC: 79 MG/DL
HGB BLD-MCNC: 14.4 G/DL (ref 11.7–15.5)
LDLC SERPL CALC-MCNC: 49 MG/DL (CALC)
MCH RBC QN AUTO: 30.1 PG (ref 27–33)
MCHC RBC AUTO-ENTMCNC: 32.1 G/DL (ref 32–36)
MCV RBC AUTO: 93.7 FL (ref 80–100)
NONHDLC SERPL-MCNC: 64 MG/DL (CALC)
PLATELET # BLD AUTO: 282 THOUSAND/UL (ref 140–400)
PMV BLD REES-ECKER: 9.4 FL (ref 7.5–12.5)
POTASSIUM SERPL-SCNC: 4.4 MMOL/L (ref 3.5–5.3)
PROT SERPL-MCNC: 7.9 G/DL (ref 6.1–8.1)
RBC # BLD AUTO: 4.79 MILLION/UL (ref 3.8–5.1)
SODIUM SERPL-SCNC: 136 MMOL/L (ref 135–146)
TRIGL SERPL-MCNC: 71 MG/DL
TSH SERPL-ACNC: 2.29 MIU/L (ref 0.4–4.5)
WBC # BLD AUTO: 8.1 THOUSAND/UL (ref 3.8–10.8)

## 2025-08-03 PROBLEM — A31.0 MYCOBACTERIUM AVIUM COMPLEX (MULTI): Status: ACTIVE | Noted: 2025-08-03

## 2025-08-03 PROBLEM — H57.9: Status: ACTIVE | Noted: 2025-08-03

## 2025-08-03 PROBLEM — F17.210 NICOTINE DEPENDENCE, CIGARETTES, UNCOMPLICATED: Status: RESOLVED | Noted: 2024-05-19 | Resolved: 2025-08-03

## 2025-08-03 ASSESSMENT — ACTIVITIES OF DAILY LIVING (ADL)
MANAGING_FINANCES: INDEPENDENT
BATHING: INDEPENDENT
GROCERY_SHOPPING: NEEDS ASSISTANCE
TAKING_MEDICATION: INDEPENDENT
DOING_HOUSEWORK: INDEPENDENT
DRESSING: INDEPENDENT

## 2025-08-03 ASSESSMENT — ENCOUNTER SYMPTOMS
UNEXPECTED WEIGHT CHANGE: 1
CHILLS: 0
RESPIRATORY NEGATIVE: 1
NAUSEA: 0
ENDOCRINE NEGATIVE: 1
CARDIOVASCULAR NEGATIVE: 1
FEVER: 0
CONFUSION: 1
ABDOMINAL PAIN: 1
WEAKNESS: 1
HEMATOLOGIC/LYMPHATIC NEGATIVE: 1
ARTHRALGIAS: 1
VOMITING: 0
ALLERGIC/IMMUNOLOGIC NEGATIVE: 1

## 2025-08-03 NOTE — PROGRESS NOTES
"Subjective   Reason for Visit: Abena Shaw is an 74 y.o. female here for a Medicare Wellness visit.               HPI   Patient states she has been losing weight her appetite is poor   Was treated for Mycobacterium avium complex was feeling slightly better was not coughing but has started coughing again did not follow-up with pulmonology is not able to go to main campus due to transportation  Memory has been progressively worse still manages finances at home but having a harder time  States that she is not able to see appropriately has not had an eye exam in a while   d up-to-date colonoscopy needs a mammogram ordered bone density order is currently a smoker   frequently forgetful has also been dizzy  Patient Care Team:  Phillip Mendosa MD as PCP - General  Phillip Mendosa MD as PCP - [employer] PCP     Review of Systems   Constitutional:  Positive for unexpected weight change. Negative for chills and fever.   HENT: Negative.     Respiratory: Negative.     Cardiovascular: Negative.    Gastrointestinal:  Positive for abdominal pain. Negative for nausea and vomiting.   Endocrine: Negative.    Genitourinary: Negative.    Musculoskeletal:  Positive for arthralgias.   Skin: Negative.  Negative for rash.   Allergic/Immunologic: Negative.    Neurological:  Positive for weakness.   Hematological: Negative.    Psychiatric/Behavioral:  Positive for confusion.    All other systems reviewed and are negative.      Objective   Vitals:  /80   Pulse 79   Ht 1.575 m (5' 2\")   Wt (!) 43.7 kg (96 lb 6.4 oz)   SpO2 96%   BMI 17.63 kg/m²       Physical Exam  Constitutional:       General: She is not in acute distress.  HENT:      Right Ear: Tympanic membrane normal.      Left Ear: Tympanic membrane normal.     Eyes:      Extraocular Movements: Extraocular movements intact.       Cardiovascular:      Rate and Rhythm: Normal rate and regular rhythm.   Pulmonary:      Breath sounds: Normal breath sounds. "   Abdominal:      Tenderness: There is abdominal tenderness.      Comments: Left lower quadrant tenderness     Musculoskeletal:         General: Normal range of motion.      Cervical back: No rigidity.     Skin:     Findings: No rash.     Neurological:      Mental Status: She is alert. She is disoriented.      Gait: Gait abnormal.     Psychiatric:         Thought Content: Thought content normal.         Assessment/Plan   Problem List Items Addressed This Visit       Dyslipidemia    Relevant Orders    Lipid Panel (Completed)    CBC (Completed)    TSH with reflex to Free T4 if abnormal (Completed)    Comprehensive Metabolic Panel (Completed)    Vitamin D 25-Hydroxy,Total (for eval of Vitamin D levels) (Completed)    Memory loss    Relevant Orders    Lipid Panel (Completed)    CBC (Completed)    TSH with reflex to Free T4 if abnormal (Completed)    Comprehensive Metabolic Panel (Completed)    Vitamin D 25-Hydroxy,Total (for eval of Vitamin D levels) (Completed)    Referral to Neurology    Prediabetes    Relevant Orders    Lipid Panel (Completed)    CBC (Completed)    TSH with reflex to Free T4 if abnormal (Completed)    Comprehensive Metabolic Panel (Completed)    Vitamin D 25-Hydroxy,Total (for eval of Vitamin D levels) (Completed)    Vitamin D deficiency    Relevant Orders    Lipid Panel (Completed)    CBC (Completed)    TSH with reflex to Free T4 if abnormal (Completed)    Comprehensive Metabolic Panel (Completed)    Vitamin D 25-Hydroxy,Total (for eval of Vitamin D levels) (Completed)    Anxiety    Relevant Orders    Lipid Panel (Completed)    CBC (Completed)    TSH with reflex to Free T4 if abnormal (Completed)    Comprehensive Metabolic Panel (Completed)    Vitamin D 25-Hydroxy,Total (for eval of Vitamin D levels) (Completed)    Routine general medical examination at health care facility - Primary    Relevant Orders    1 Year Follow Up In Primary Care - Wellness Exam    RESOLVED: Nicotine dependence, cigarettes,  "uncomplicated    Elevated coronary artery calcium score    Relevant Orders    Lipid Panel (Completed)    CBC (Completed)    TSH with reflex to Free T4 if abnormal (Completed)    Comprehensive Metabolic Panel (Completed)    Vitamin D 25-Hydroxy,Total (for eval of Vitamin D levels) (Completed)    Mycobacterium avium complex (Multi)    Relevant Medications    azithromycin (Zithromax) 250 mg tablet    Other Relevant Orders    Referral to Pulmonology    Lipid Panel (Completed)    CBC (Completed)    TSH with reflex to Free T4 if abnormal (Completed)    Comprehensive Metabolic Panel (Completed)    Vitamin D 25-Hydroxy,Total (for eval of Vitamin D levels) (Completed)    Ophthalmological disorder    Relevant Orders    Referral to Ophthalmology    spent 15 minutes obtaining and discussing depression screening using PHQ-2 questions with results documented in chart.”  (If screen positive: \"The screen indicated potential depression and PHQ-9 was obtained with treatment and referral plan discussed      I            "

## 2025-09-29 ENCOUNTER — APPOINTMENT (OUTPATIENT)
Dept: CARDIOLOGY | Facility: CLINIC | Age: 75
End: 2025-09-29
Payer: MEDICARE

## 2025-11-10 ENCOUNTER — APPOINTMENT (OUTPATIENT)
Dept: OPHTHALMOLOGY | Facility: CLINIC | Age: 75
End: 2025-11-10
Payer: MEDICARE

## (undated) DEVICE — Device

## (undated) DEVICE — INTRODUCER SYSTEM, PRELUDE SNAP, SPLITTABLE, HEMOSTATIC, 6FR

## (undated) DEVICE — INTRODUCER, MICRO, 4FR, 7CM ECHO

## (undated) DEVICE — CABLE, SURGICAL, DISP